# Patient Record
Sex: FEMALE | Race: WHITE | Employment: FULL TIME | URBAN - METROPOLITAN AREA
[De-identification: names, ages, dates, MRNs, and addresses within clinical notes are randomized per-mention and may not be internally consistent; named-entity substitution may affect disease eponyms.]

---

## 2021-04-20 RX ORDER — MIRTAZAPINE 15 MG/1
15 TABLET, FILM COATED ORAL NIGHTLY
Status: ON HOLD | COMMUNITY
End: 2022-10-11

## 2021-04-20 RX ORDER — LOSARTAN POTASSIUM 25 MG/1
25 TABLET ORAL DAILY
COMMUNITY

## 2021-04-20 RX ORDER — PRAVASTATIN SODIUM 40 MG
40 TABLET ORAL DAILY
COMMUNITY

## 2021-04-20 RX ORDER — THIAMINE MONONITRATE (VIT B1) 100 MG
100 TABLET ORAL DAILY
COMMUNITY

## 2021-04-20 RX ORDER — FUROSEMIDE 40 MG/1
40 TABLET ORAL DAILY
Status: ON HOLD | COMMUNITY
End: 2022-10-28 | Stop reason: HOSPADM

## 2021-04-20 RX ORDER — OMEPRAZOLE 20 MG/1
20 CAPSULE, DELAYED RELEASE ORAL NIGHTLY
COMMUNITY

## 2021-04-20 RX ORDER — GLIPIZIDE 5 MG/1
5 TABLET, FILM COATED, EXTENDED RELEASE ORAL DAILY
COMMUNITY

## 2021-04-20 RX ORDER — METOPROLOL SUCCINATE 50 MG/1
50 TABLET, EXTENDED RELEASE ORAL DAILY
Status: ON HOLD | COMMUNITY
End: 2022-10-11

## 2021-04-20 RX ORDER — ARIPIPRAZOLE 10 MG/1
10 TABLET ORAL DAILY
Status: ON HOLD | COMMUNITY
End: 2022-10-11

## 2021-04-22 ENCOUNTER — ANESTHESIA (OUTPATIENT)
Dept: OPERATING ROOM | Age: 66
End: 2021-04-22
Payer: MEDICAID

## 2021-04-22 ENCOUNTER — HOSPITAL ENCOUNTER (OUTPATIENT)
Age: 66
Setting detail: OUTPATIENT SURGERY
Discharge: HOME | End: 2021-04-22
Attending: OPHTHALMOLOGY | Admitting: OPHTHALMOLOGY
Payer: MEDICAID

## 2021-04-22 ENCOUNTER — ANESTHESIA EVENT (OUTPATIENT)
Dept: OPERATING ROOM | Age: 66
End: 2021-04-22
Payer: MEDICAID

## 2021-04-22 VITALS
TEMPERATURE: 98.6 F | OXYGEN SATURATION: 97 % | DIASTOLIC BLOOD PRESSURE: 85 MMHG | SYSTOLIC BLOOD PRESSURE: 165 MMHG | RESPIRATION RATE: 14 BRPM | BODY MASS INDEX: 28.93 KG/M2 | HEART RATE: 70 BPM | HEIGHT: 66 IN | WEIGHT: 180 LBS

## 2021-04-22 VITALS
DIASTOLIC BLOOD PRESSURE: 84 MMHG | OXYGEN SATURATION: 100 % | SYSTOLIC BLOOD PRESSURE: 163 MMHG | RESPIRATION RATE: 17 BRPM | TEMPERATURE: 95.6 F

## 2021-04-22 PROBLEM — H35.372 MACULAR PUCKER, LEFT EYE: Chronic | Status: ACTIVE | Noted: 2021-04-22

## 2021-04-22 LAB
GFR NON-AFRICAN AMERICAN: >60 ML/MIN
GFR SERPL CREATININE-BSD FRML MDRD: >60 ML/MIN
GFR SERPL CREATININE-BSD FRML MDRD: NORMAL ML/MIN/{1.73_M2}
GLUCOSE BLD-MCNC: 121 MG/DL (ref 74–100)
GLUCOSE BLD-MCNC: 87 MG/DL (ref 65–105)
POC CREATININE: 0.88 MG/DL (ref 0.51–1.19)
SARS-COV-2, RAPID: NOT DETECTED
SPECIMEN DESCRIPTION: NORMAL

## 2021-04-22 PROCEDURE — 2709999900 HC NON-CHARGEABLE SUPPLY: Performed by: OPHTHALMOLOGY

## 2021-04-22 PROCEDURE — 3700000000 HC ANESTHESIA ATTENDED CARE: Performed by: OPHTHALMOLOGY

## 2021-04-22 PROCEDURE — 2580000003 HC RX 258: Performed by: ANESTHESIOLOGY

## 2021-04-22 PROCEDURE — 6370000000 HC RX 637 (ALT 250 FOR IP): Performed by: OPHTHALMOLOGY

## 2021-04-22 PROCEDURE — 2500000003 HC RX 250 WO HCPCS: Performed by: NURSE ANESTHETIST, CERTIFIED REGISTERED

## 2021-04-22 PROCEDURE — 87635 SARS-COV-2 COVID-19 AMP PRB: CPT

## 2021-04-22 PROCEDURE — 6360000002 HC RX W HCPCS: Performed by: NURSE ANESTHETIST, CERTIFIED REGISTERED

## 2021-04-22 PROCEDURE — 6360000002 HC RX W HCPCS: Performed by: OPHTHALMOLOGY

## 2021-04-22 PROCEDURE — 3600000014 HC SURGERY LEVEL 4 ADDTL 15MIN: Performed by: OPHTHALMOLOGY

## 2021-04-22 PROCEDURE — 3600000004 HC SURGERY LEVEL 4 BASE: Performed by: OPHTHALMOLOGY

## 2021-04-22 PROCEDURE — 7100000010 HC PHASE II RECOVERY - FIRST 15 MIN: Performed by: OPHTHALMOLOGY

## 2021-04-22 PROCEDURE — 82565 ASSAY OF CREATININE: CPT

## 2021-04-22 PROCEDURE — 3700000001 HC ADD 15 MINUTES (ANESTHESIA): Performed by: OPHTHALMOLOGY

## 2021-04-22 PROCEDURE — 7100000011 HC PHASE II RECOVERY - ADDTL 15 MIN: Performed by: OPHTHALMOLOGY

## 2021-04-22 PROCEDURE — 93005 ELECTROCARDIOGRAM TRACING: CPT | Performed by: ANESTHESIOLOGY

## 2021-04-22 PROCEDURE — 82947 ASSAY GLUCOSE BLOOD QUANT: CPT

## 2021-04-22 PROCEDURE — 2580000003 HC RX 258: Performed by: OPHTHALMOLOGY

## 2021-04-22 PROCEDURE — 2500000003 HC RX 250 WO HCPCS: Performed by: OPHTHALMOLOGY

## 2021-04-22 RX ORDER — TROPICAMIDE 10 MG/ML
1 SOLUTION/ DROPS OPHTHALMIC
Status: COMPLETED | OUTPATIENT
Start: 2021-04-22 | End: 2021-04-22

## 2021-04-22 RX ORDER — TOBRAMYCIN AND DEXAMETHASONE 3; 1 MG/ML; MG/ML
1 SUSPENSION/ DROPS OPHTHALMIC ONCE
Status: COMPLETED | OUTPATIENT
Start: 2021-04-22 | End: 2021-04-22

## 2021-04-22 RX ORDER — PROMETHAZINE HYDROCHLORIDE 25 MG/ML
6.25 INJECTION, SOLUTION INTRAMUSCULAR; INTRAVENOUS
Status: DISCONTINUED | OUTPATIENT
Start: 2021-04-22 | End: 2021-04-22 | Stop reason: HOSPADM

## 2021-04-22 RX ORDER — METOCLOPRAMIDE HYDROCHLORIDE 5 MG/ML
10 INJECTION INTRAMUSCULAR; INTRAVENOUS
Status: DISCONTINUED | OUTPATIENT
Start: 2021-04-22 | End: 2021-04-22 | Stop reason: HOSPADM

## 2021-04-22 RX ORDER — TROPICAMIDE 10 MG/ML
SOLUTION/ DROPS OPHTHALMIC PRN
Status: DISCONTINUED | OUTPATIENT
Start: 2021-04-22 | End: 2021-04-22 | Stop reason: ALTCHOICE

## 2021-04-22 RX ORDER — DIPHENHYDRAMINE HYDROCHLORIDE 50 MG/ML
12.5 INJECTION INTRAMUSCULAR; INTRAVENOUS
Status: DISCONTINUED | OUTPATIENT
Start: 2021-04-22 | End: 2021-04-22 | Stop reason: HOSPADM

## 2021-04-22 RX ORDER — BALANCED SALT SOLUTION ENRICHED WITH BICARBONATE, DEXTROSE, AND GLUTATHIONE
KIT INTRAOCULAR PRN
Status: DISCONTINUED | OUTPATIENT
Start: 2021-04-22 | End: 2021-04-22 | Stop reason: ALTCHOICE

## 2021-04-22 RX ORDER — PHENYLEPHRINE HYDROCHLORIDE 100 MG/ML
1 SOLUTION/ DROPS OPHTHALMIC
Status: COMPLETED | OUTPATIENT
Start: 2021-04-22 | End: 2021-04-22

## 2021-04-22 RX ORDER — CEFTAZIDIME 1 G/1
INJECTION, POWDER, FOR SOLUTION INTRAMUSCULAR; INTRAVENOUS PRN
Status: DISCONTINUED | OUTPATIENT
Start: 2021-04-22 | End: 2021-04-22 | Stop reason: ALTCHOICE

## 2021-04-22 RX ORDER — SODIUM CHLORIDE, SODIUM LACTATE, POTASSIUM CHLORIDE, CALCIUM CHLORIDE 600; 310; 30; 20 MG/100ML; MG/100ML; MG/100ML; MG/100ML
INJECTION, SOLUTION INTRAVENOUS CONTINUOUS
Status: DISCONTINUED | OUTPATIENT
Start: 2021-04-22 | End: 2021-04-22 | Stop reason: HOSPADM

## 2021-04-22 RX ORDER — LIDOCAINE HYDROCHLORIDE 10 MG/ML
INJECTION, SOLUTION EPIDURAL; INFILTRATION; INTRACAUDAL; PERINEURAL PRN
Status: DISCONTINUED | OUTPATIENT
Start: 2021-04-22 | End: 2021-04-22 | Stop reason: SDUPTHER

## 2021-04-22 RX ORDER — INDOCYANINE GREEN AND WATER 25 MG
KIT INJECTION PRN
Status: DISCONTINUED | OUTPATIENT
Start: 2021-04-22 | End: 2021-04-22 | Stop reason: ALTCHOICE

## 2021-04-22 RX ORDER — PROPOFOL 10 MG/ML
INJECTION, EMULSION INTRAVENOUS PRN
Status: DISCONTINUED | OUTPATIENT
Start: 2021-04-22 | End: 2021-04-22 | Stop reason: SDUPTHER

## 2021-04-22 RX ORDER — ERYTHROMYCIN 5 MG/G
OINTMENT OPHTHALMIC PRN
Status: DISCONTINUED | OUTPATIENT
Start: 2021-04-22 | End: 2021-04-22 | Stop reason: ALTCHOICE

## 2021-04-22 RX ORDER — DEXTROSE MONOHYDRATE 25 G/50ML
INJECTION, SOLUTION INTRAVENOUS PRN
Status: DISCONTINUED | OUTPATIENT
Start: 2021-04-22 | End: 2021-04-22 | Stop reason: ALTCHOICE

## 2021-04-22 RX ORDER — HYDROCODONE BITARTRATE AND ACETAMINOPHEN 5; 325 MG/1; MG/1
1 TABLET ORAL
Status: DISCONTINUED | OUTPATIENT
Start: 2021-04-22 | End: 2021-04-22 | Stop reason: HOSPADM

## 2021-04-22 RX ORDER — HYDRALAZINE HYDROCHLORIDE 20 MG/ML
5 INJECTION INTRAMUSCULAR; INTRAVENOUS EVERY 10 MIN PRN
Status: DISCONTINUED | OUTPATIENT
Start: 2021-04-22 | End: 2021-04-22 | Stop reason: HOSPADM

## 2021-04-22 RX ORDER — FENTANYL CITRATE 50 UG/ML
INJECTION, SOLUTION INTRAMUSCULAR; INTRAVENOUS PRN
Status: DISCONTINUED | OUTPATIENT
Start: 2021-04-22 | End: 2021-04-22 | Stop reason: SDUPTHER

## 2021-04-22 RX ORDER — LIDOCAINE HYDROCHLORIDE 20 MG/ML
INJECTION, SOLUTION INFILTRATION; PERINEURAL PRN
Status: DISCONTINUED | OUTPATIENT
Start: 2021-04-22 | End: 2021-04-22 | Stop reason: ALTCHOICE

## 2021-04-22 RX ORDER — MEPERIDINE HYDROCHLORIDE 50 MG/ML
12.5 INJECTION INTRAMUSCULAR; INTRAVENOUS; SUBCUTANEOUS EVERY 5 MIN PRN
Status: DISCONTINUED | OUTPATIENT
Start: 2021-04-22 | End: 2021-04-22 | Stop reason: HOSPADM

## 2021-04-22 RX ADMIN — PHENYLEPHRINE HYDROCHLORIDE 1 DROP: 100 SOLUTION/ DROPS OPHTHALMIC at 12:15

## 2021-04-22 RX ADMIN — TOBRAMYCIN AND DEXAMETHASONE 1 DROP: 3; 1 SUSPENSION/ DROPS OPHTHALMIC at 12:20

## 2021-04-22 RX ADMIN — PHENYLEPHRINE HYDROCHLORIDE 1 DROP: 100 SOLUTION/ DROPS OPHTHALMIC at 12:09

## 2021-04-22 RX ADMIN — PHENYLEPHRINE HYDROCHLORIDE 1 DROP: 100 SOLUTION/ DROPS OPHTHALMIC at 12:20

## 2021-04-22 RX ADMIN — LIDOCAINE HYDROCHLORIDE 50 MG: 10 INJECTION, SOLUTION EPIDURAL; INFILTRATION; INTRACAUDAL; PERINEURAL at 13:14

## 2021-04-22 RX ADMIN — SODIUM CHLORIDE, POTASSIUM CHLORIDE, SODIUM LACTATE AND CALCIUM CHLORIDE: 600; 310; 30; 20 INJECTION, SOLUTION INTRAVENOUS at 13:10

## 2021-04-22 RX ADMIN — TROPICAMIDE 1 DROP: 10 SOLUTION/ DROPS OPHTHALMIC at 12:09

## 2021-04-22 RX ADMIN — PROPOFOL 40 MG: 10 INJECTION, EMULSION INTRAVENOUS at 13:15

## 2021-04-22 RX ADMIN — FENTANYL CITRATE 50 MCG: 50 INJECTION, SOLUTION INTRAMUSCULAR; INTRAVENOUS at 13:20

## 2021-04-22 RX ADMIN — FENTANYL CITRATE 50 MCG: 50 INJECTION, SOLUTION INTRAMUSCULAR; INTRAVENOUS at 13:36

## 2021-04-22 RX ADMIN — SODIUM CHLORIDE, POTASSIUM CHLORIDE, SODIUM LACTATE AND CALCIUM CHLORIDE: 600; 310; 30; 20 INJECTION, SOLUTION INTRAVENOUS at 12:21

## 2021-04-22 RX ADMIN — TROPICAMIDE 1 DROP: 10 SOLUTION/ DROPS OPHTHALMIC at 12:15

## 2021-04-22 RX ADMIN — TROPICAMIDE 1 DROP: 10 SOLUTION/ DROPS OPHTHALMIC at 12:20

## 2021-04-22 ASSESSMENT — PULMONARY FUNCTION TESTS
PIF_VALUE: 0
PIF_VALUE: 1
PIF_VALUE: 0
PIF_VALUE: 1
PIF_VALUE: 0

## 2021-04-22 ASSESSMENT — PAIN SCALES - GENERAL: PAINLEVEL_OUTOF10: 0

## 2021-04-22 NOTE — FLOWSHEET NOTE
Attempted to call sister, Sb Patel, with no answer. Pt has medical ride how and will be staying with sister overnight.

## 2021-04-22 NOTE — OP NOTE
Operative Note      Patient: Akash Mendoza  YOB: 1955  MRN: [id number]    Date of Procedure: 4/22/2021    Akash Mendoza   4/22/2021      2:07 PM  1955  [id number]    BRIEF OPERATIVE NOTE    PREOP Diagnosis: Macular Pucker, Left Eye    POSTOP Diagnosis:  Same    Procedure:  Vitrectomy,  membrane stripping, removal of internal limiting membrane,  ICG, Left eye    Anesthesia:  MAC    Surgeon:  Betty Cedeño MD    EBL:  Minimal    Fluids:  See anesthesia record    Drains:  None    Specimen:  None    Complications:  None    Reason for operation:   The patient has significant vision loss that is interfering with daily activities. Patient wishes to have surgery to remove macular pucker in anticipation of better vision. Patient understands vision is not likely to return fully to normal and it will take 3-6 months to obtain best vision. Patient understands risks of surgery include, but are not limited to, bleeding, infection and retinal detachment. Cataract development is accelerated, usually resulting in cataract surgery in 6-24 months. Procedure: The patient was brought to the operating room in good condition. Transient Propofol was given. Retrobulbar and topical anaesthesia were administered. The patient was prepped and draped in the usual manner. 25 gauge vitrectomy trocars were inserted in the usual quadrants. Infusion was inserted in the inferior temporal quadrant. Light pipe and ocutome placed through the superior trocars. Vitreous was removed from anterior to posterior and trimmed out past the equator in all quadrants. ICG was placed over the posterior pole and allowed to remain in place for 30 seconds. It was removed with silicone tipped extrusion. Intraocular forceps were used to peel ILM and ERM from the central 2/3 of the posterior pole. No significant bleeding occurred at any time. No tears or detachment were found with scleral depression.  The trocars were removed and sutured if there any leakage. Subtenon's antibiotic was injected in the inferior nasal quadrant. The eye was patched in the usual fashion and the patient taken to the recovery room in good condition.         Electronically signed by Nimisha Alvarado MD on 4/22/2021 at 2:07 PMMa

## 2021-04-22 NOTE — H&P
History and Physical    Pt Name: Michael Lou  MRN: [id number]  YOB: 1955  Date of evaluation: 4/22/2021    SUBJECTIVE:   History of Chief Complaint:    Patient presents preprocedure for left vitrectomy. She relates blurred vision to the left eye. She has not had any eye procedures in the past.  She has been diagnosed with macular pucker, scheduled for vitrectomy today. Past Medical History    has a past medical history of Depression, Diabetes mellitus ([address] GERD (gastroesophageal reflux disease), Hepatitis C, Hyperlipidemia, Hypertension, Irregular heartbeat, Murmur, cardiac, Urinary bladder incontinence, Wears eyeglasses, Well adult health check, Well adult health check, and Well adult health check. Past Surgical History   has a past surgical history that includes hip surgery (Right, 1964); Tubal ligation (1975); Breast surgery (Right, 1975); Tonsillectomy; and Colonoscopy. Medications  Prior to Admission medications    Medication Sig Start Date End Date Taking?  Authorizing Provider   furosemide (LASIX) 40 MG tablet Take 40 mg by mouth daily   Yes Historical Provider, MD   vitamin B-1 (THIAMINE) 100 MG tablet Take 100 mg by mouth daily   Yes Historical Provider, MD   pravastatin (PRAVACHOL) 40 MG tablet Take 40 mg by mouth daily   Yes Historical Provider, MD   losartan (COZAAR) 25 MG tablet Take 25 mg by mouth daily   Yes Historical Provider, MD   metoprolol succinate (TOPROL XL) 50 MG extended release tablet Take 50 mg by mouth daily   Yes Historical Provider, MD   glipiZIDE (GLUCOTROL XL) 5 MG extended release tablet Take 5 mg by mouth 2 times daily   Yes Historical Provider, MD   VORTIoxetine HBr (TRINTELLIX) 20 MG TABS tablet Take 10 mg by mouth nightly   Yes Historical Provider, MD   ARIPiprazole (ABILIFY) 10 MG tablet Take 10 mg by mouth daily   Yes Historical Provider, MD   omeprazole (PRILOSEC) 20 MG delayed release capsule Take 20 mg by mouth daily   Yes Historical Provider, MD mirtazapine (REMERON) 15 MG tablet Take 15 mg by mouth nightly   Yes Historical Provider, MD     Allergies  is allergic to lisinopril. Family History  family history is not on file. Social History   reports that she quit smoking about 25 years ago. She has never used smokeless tobacco.   has no history on file for alcohol.   has no history on file for drug. Marital Status single    OBJECTIVE:   VITALS:  height is 5' 6\" (1.676 m) and weight is 180 lb (81.6 kg). Her temporal temperature is 97.2 °F (36.2 °C). Her blood pressure is 183/97 (abnormal) and her pulse is 67. Her respiration is 18. CONSTITUTIONAL:alert & oriented x 3, no acute distress. Pleasant. SKIN:  Warm and dry, no rashes on exposed areas of skin. HEAD:  Normocephalic, atraumatic. EYES: EOMs intact. EARS:  Hearing grossly WNL. NOSE:  Nares patent. No rhinorrhea. MOUTH/THROAT:  benign  NECK:supple, no lymphadenopathy  LUNGS: Clear to auscultation bilaterally, no wheezes. CARDIOVASCULAR: RRR. Very faint systolic murmur noted. ABDOMEN: soft, non tender, non distended. Rotund. EXTREMITIES: no edema bilateral lower extremities. IMPRESSIONS:   1. Left macular pucker  2.  has a past medical history of Depression, Diabetes mellitus ([address] GERD (gastroesophageal reflux disease), Hepatitis C, Hyperlipidemia, Hypertension, Irregular heartbeat, Murmur, cardiac, Urinary bladder incontinence, Wears eyeglasses, Well adult health check, Well adult health check, and Well adult health check. PLANS:   1.  Left vitrectomy    NAHED Pierre PA-C  Electronically signed 4/22/2021 at 12:16 PM

## 2021-04-22 NOTE — ANESTHESIA POSTPROCEDURE EVALUATION
Department of Anesthesiology  Postprocedure Note    Patient: Juancarlos Zelaya  MRN: [id number]  [place]: 1955  Date of evaluation: 4/22/2021  Time:  4:54 PM     Procedure Summary     Date: 04/22/21 Room / Location: [address]    Anesthesia Start: 0946 Anesthesia Stop: 5513    Procedure: VITRECTOMY 25 GAUGE, MEMBRANE PEEL, ICG (Left ) Diagnosis: (MACULAR PUCKER)    Surgeons: Chemo Tucker MD Responsible Provider: Guadlupe Kayser, MD    Anesthesia Type: MAC ASA Status: 3          Anesthesia Type: MAC    Ander Phase I:      Ander Phase II: Ander Score: 10    Last vitals: Reviewed and per EMR flowsheets.        Anesthesia Post Evaluation    Patient location during evaluation: PACU  Patient participation: complete - patient participated  Level of consciousness: awake and alert  Pain score: 0  Airway patency: patent  Nausea & Vomiting: no nausea and no vomiting  Complications: no  Cardiovascular status: hemodynamically stable  Respiratory status: acceptable  Hydration status: euvolemic

## 2021-04-22 NOTE — ANESTHESIA PRE PROCEDURE
Department of Anesthesiology  Preprocedure Note       Name:  Keely Campbell   Age:  61 y.o.  :  1955                                          MRN:  [id number]         Date:  2021      Surgeon: Olamide Lopez):  Chandrakant Michaels MD    Procedure: Procedure(s):  VITRECTOMY 25 GAUGE, MEMBRANE PEEL, ICG    Medications prior to admission:   Prior to Admission medications    Medication Sig Start Date End Date Taking?  Authorizing Provider   furosemide (LASIX) 40 MG tablet Take 40 mg by mouth daily   Yes Historical Provider, MD   vitamin B-1 (THIAMINE) 100 MG tablet Take 100 mg by mouth daily   Yes Historical Provider, MD   pravastatin (PRAVACHOL) 40 MG tablet Take 40 mg by mouth daily   Yes Historical Provider, MD   losartan (COZAAR) 25 MG tablet Take 25 mg by mouth daily   Yes Historical Provider, MD   metoprolol succinate (TOPROL XL) 50 MG extended release tablet Take 50 mg by mouth daily   Yes Historical Provider, MD   glipiZIDE (GLUCOTROL XL) 5 MG extended release tablet Take 5 mg by mouth 2 times daily   Yes Historical Provider, MD   VORTIoxetine HBr (TRINTELLIX) 20 MG TABS tablet Take 10 mg by mouth nightly   Yes Historical Provider, MD   ARIPiprazole (ABILIFY) 10 MG tablet Take 10 mg by mouth daily   Yes Historical Provider, MD   omeprazole (PRILOSEC) 20 MG delayed release capsule Take 20 mg by mouth daily   Yes Historical Provider, MD   mirtazapine (REMERON) 15 MG tablet Take 15 mg by mouth nightly   Yes Historical Provider, MD       Current medications:    Current Facility-Administered Medications   Medication Dose Route Frequency Provider Last Rate Last Admin    tobramycin-dexamethasone (TOBRADEX) ophthalmic suspension 1 drop  1 drop Left Eye Once Chandrakant Michaels MD        tropicamide (MYDRIACYL) 1 % ophthalmic solution 1 drop  1 drop Left Eye Q5 Min Chandrakant Michaels MD        phenylephrine (VERONICA-SYNEPHRINE) 10 % ophthalmic solution 1 drop  1 drop Left Eye Q5 John Rubio MD           Allergies: Allergies   Allergen Reactions    Lisinopril Other (See Comments)     COUGH       Problem List:  There is no problem list on file for this patient. Past Medical History:        Diagnosis Date    Depression     Diabetes mellitus (HCC)     GERD (gastroesophageal reflux disease)     Hepatitis C     WITH FULL RECOVERY - INFECTIOUS DISEASE DR. Ina Calderon - LAST VISIT 2020    Hyperlipidemia     Hypertension     Irregular heartbeat     Murmur, cardiac     noted by PCP, no echo per patient    Urinary bladder incontinence     Wears eyeglasses     Well adult health check     PCP - DR. Eric Doherty - 3/2021    [facility] adult health check     INFECTIOUS DISEASE - DR. Maria Isabel Nolan    [facility] adult health check     GI - DR. Rosie Cook       Past Surgical History:        Procedure Laterality Date    BREAST SURGERY Right     REMOVAL BENIGN TUMOR    COLONOSCOPY      HIP SURGERY Right     RECONSTRUCTION    TONSILLECTOMY      AS A CHILD    TUBAL LIGATION         Social History:    Social History     Tobacco Use    Smoking status: Former Smoker     Quit date:      Years since quittin.3    Smokeless tobacco: Never Used   Substance Use Topics    Alcohol use: Not on file                                Counseling given: Not Answered      Vital Signs (Current):   Vitals:    21 0946   Weight: 180 lb (81.6 kg)   Height: 5' 6\" (1.676 m)                                              BP Readings from Last 3 Encounters:   No data found for BP       NPO Status:                                                                                 BMI:   Wt Readings from Last 3 Encounters:   21 180 lb (81.6 kg)     Body mass index is 29.05 kg/m².     CBC: No results found for: WBC, RBC, HGB, HCT, MCV, RDW, PLT    CMP: No results found for: NA, K, CL, CO2, BUN, CREATININE, GFRAA, AGRATIO, LABGLOM, GLUCOSE, PROT, CALCIUM, BILITOT, ALKPHOS, AST, ALT    POC Tests: No

## 2021-04-23 LAB
EKG ATRIAL RATE: 66 BPM
EKG P AXIS: -6 DEGREES
EKG P-R INTERVAL: 180 MS
EKG Q-T INTERVAL: 414 MS
EKG QRS DURATION: 78 MS
EKG QTC CALCULATION (BAZETT): 434 MS
EKG R AXIS: 3 DEGREES
EKG T AXIS: 58 DEGREES
EKG VENTRICULAR RATE: 66 BPM

## 2022-10-10 ENCOUNTER — HOSPITAL ENCOUNTER (OUTPATIENT)
Age: 67
Setting detail: SPECIMEN
Discharge: HOME | End: 2022-10-10

## 2022-10-10 LAB
CHOLESTEROL/HDL RATIO: 4.2
CHOLESTEROL: 138 MG/DL
HDLC SERPL-MCNC: 33 MG/DL
LDL CHOLESTEROL: 51 MG/DL (ref 0–130)
TRIGL SERPL-MCNC: 269 MG/DL

## 2022-10-10 PROCEDURE — P9603 ONE-WAY ALLOW PRORATED MILES: HCPCS

## 2022-10-10 PROCEDURE — 80061 LIPID PANEL: CPT

## 2022-10-10 PROCEDURE — 36415 COLL VENOUS BLD VENIPUNCTURE: CPT

## 2022-10-11 ENCOUNTER — HOSPITAL ENCOUNTER (INPATIENT)
Age: 67
LOS: 17 days | Discharge: NURSING, REHAB OR LONG-TERM CARE | DRG: 872 | End: 2022-10-28
Attending: EMERGENCY MEDICINE | Admitting: FAMILY MEDICINE
Payer: MEDICARE

## 2022-10-11 ENCOUNTER — APPOINTMENT (OUTPATIENT)
Dept: CT IMAGING | Age: 67
DRG: 872 | End: 2022-10-11
Payer: MEDICARE

## 2022-10-11 ENCOUNTER — HOSPITAL ENCOUNTER (OUTPATIENT)
Age: 67
Setting detail: SPECIMEN
Discharge: HOME | End: 2022-10-11

## 2022-10-11 ENCOUNTER — APPOINTMENT (OUTPATIENT)
Dept: GENERAL RADIOLOGY | Age: 67
DRG: 872 | End: 2022-10-11
Payer: MEDICARE

## 2022-10-11 DIAGNOSIS — E86.0 DEHYDRATION: ICD-10-CM

## 2022-10-11 DIAGNOSIS — F32.3 MAJOR DEPRESSIVE DISORDER WITH PSYCHOTIC FEATURES (HCC): ICD-10-CM

## 2022-10-11 DIAGNOSIS — A41.01 MSSA (METHICILLIN SUSCEPTIBLE STAPHYLOCOCCUS AUREUS) SEPTICEMIA (HCC): ICD-10-CM

## 2022-10-11 DIAGNOSIS — E87.0 HYPERNATREMIA: Primary | ICD-10-CM

## 2022-10-11 DIAGNOSIS — R41.82 ALTERED MENTAL STATUS, UNSPECIFIED ALTERED MENTAL STATUS TYPE: ICD-10-CM

## 2022-10-11 PROBLEM — I25.10 ASHD (ARTERIOSCLEROTIC HEART DISEASE): Status: ACTIVE | Noted: 2022-10-11

## 2022-10-11 PROBLEM — I10 ESSENTIAL HYPERTENSION: Status: ACTIVE | Noted: 2022-10-11

## 2022-10-11 PROBLEM — E11.9 TYPE 2 DIABETES MELLITUS WITHOUT COMPLICATION (HCC): Status: ACTIVE | Noted: 2022-10-11

## 2022-10-11 PROBLEM — K21.9 GASTROESOPHAGEAL REFLUX DISEASE WITHOUT ESOPHAGITIS: Status: ACTIVE | Noted: 2022-10-11

## 2022-10-11 LAB
ABSOLUTE EOS #: 0 K/UL (ref 0–0.44)
ABSOLUTE IMMATURE GRANULOCYTE: 0 K/UL (ref 0–0.3)
ABSOLUTE LYMPH #: 1.58 K/UL (ref 1.1–3.7)
ABSOLUTE MONO #: 1.58 K/UL (ref 0.1–1.2)
ALBUMIN SERPL-MCNC: 4.3 G/DL (ref 3.5–5.2)
ALBUMIN SERPL-MCNC: 4.5 G/DL (ref 3.5–5.2)
ALBUMIN/GLOBULIN RATIO: 1.2 (ref 1–2.5)
ALBUMIN/GLOBULIN RATIO: 1.2 (ref 1–2.5)
ALP BLD-CCNC: 60 U/L (ref 35–104)
ALP BLD-CCNC: 62 U/L (ref 35–104)
ALT SERPL-CCNC: 53 U/L (ref 5–33)
ALT SERPL-CCNC: 56 U/L (ref 5–33)
AMMONIA: 18 UMOL/L (ref 11–41)
AMPHETAMINE SCREEN URINE: NEGATIVE
ANION GAP SERPL CALCULATED.3IONS-SCNC: 13 MMOL/L (ref 9–17)
ANION GAP SERPL CALCULATED.3IONS-SCNC: 14 MMOL/L (ref 9–17)
ANION GAP SERPL CALCULATED.3IONS-SCNC: 18 MMOL/L (ref 9–17)
ANION GAP SERPL CALCULATED.3IONS-SCNC: 18 MMOL/L (ref 9–17)
AST SERPL-CCNC: 59 U/L
AST SERPL-CCNC: 68 U/L
BACTERIA: ABNORMAL
BACTERIA: ABNORMAL
BARBITURATE SCREEN URINE: NEGATIVE
BASOPHILS # BLD: 0 % (ref 0–2)
BASOPHILS ABSOLUTE: 0 K/UL (ref 0–0.2)
BENZODIAZEPINE SCREEN, URINE: POSITIVE
BILIRUB SERPL-MCNC: 1.6 MG/DL (ref 0.3–1.2)
BILIRUB SERPL-MCNC: 1.7 MG/DL (ref 0.3–1.2)
BILIRUBIN URINE: NEGATIVE
BILIRUBIN URINE: NEGATIVE
BUN BLDV-MCNC: 64 MG/DL (ref 8–23)
BUN BLDV-MCNC: 67 MG/DL (ref 8–23)
BUN BLDV-MCNC: 68 MG/DL (ref 8–23)
BUN BLDV-MCNC: 72 MG/DL (ref 8–23)
BUN/CREAT BLD: 26 (ref 9–20)
BUN/CREAT BLD: 29 (ref 9–20)
BUN/CREAT BLD: 31 (ref 9–20)
BUN/CREAT BLD: 32 (ref 9–20)
BUPRENORPHINE URINE: NEGATIVE
CALCIUM IONIZED: 1.25 MMOL/L (ref 1.13–1.33)
CALCIUM SERPL-MCNC: 10 MG/DL (ref 8.6–10.4)
CALCIUM SERPL-MCNC: 9.5 MG/DL (ref 8.6–10.4)
CALCIUM SERPL-MCNC: 9.6 MG/DL (ref 8.6–10.4)
CALCIUM SERPL-MCNC: 9.9 MG/DL (ref 8.6–10.4)
CANNABINOID SCREEN URINE: NEGATIVE
CHLORIDE BLD-SCNC: 114 MMOL/L (ref 98–107)
CHLORIDE BLD-SCNC: 115 MMOL/L (ref 98–107)
CHLORIDE BLD-SCNC: 118 MMOL/L (ref 98–107)
CHLORIDE BLD-SCNC: 121 MMOL/L (ref 98–107)
CHLORIDE, UR: <20 MMOL/L
CO2: 25 MMOL/L (ref 20–31)
CO2: 26 MMOL/L (ref 20–31)
CO2: 26 MMOL/L (ref 20–31)
CO2: 28 MMOL/L (ref 20–31)
COCAINE METABOLITE, URINE: NEGATIVE
COLOR: YELLOW
COLOR: YELLOW
CREAT SERPL-MCNC: 1.97 MG/DL (ref 0.5–0.9)
CREAT SERPL-MCNC: 2.17 MG/DL (ref 0.5–0.9)
CREAT SERPL-MCNC: 2.5 MG/DL (ref 0.5–0.9)
CREAT SERPL-MCNC: 2.62 MG/DL (ref 0.5–0.9)
CREATININE URINE: 126.2 MG/DL (ref 28–217)
CREATININE URINE: 127.5 MG/DL (ref 28–217)
EKG ATRIAL RATE: 90 BPM
EKG P-R INTERVAL: 152 MS
EKG Q-T INTERVAL: 400 MS
EKG QRS DURATION: 76 MS
EKG QTC CALCULATION (BAZETT): 489 MS
EKG R AXIS: -154 DEGREES
EKG T AXIS: 164 DEGREES
EKG VENTRICULAR RATE: 90 BPM
EOSINOPHILS RELATIVE PERCENT: 0 % (ref 1–4)
EPITHELIAL CELLS UA: ABNORMAL /HPF (ref 0–25)
EPITHELIAL CELLS UA: ABNORMAL /HPF (ref 0–25)
GFR SERPL CREATININE-BSD FRML MDRD: 20 ML/MIN/1.73M2
GFR SERPL CREATININE-BSD FRML MDRD: 21 ML/MIN/1.73M2
GFR SERPL CREATININE-BSD FRML MDRD: 25 ML/MIN/1.73M2
GFR SERPL CREATININE-BSD FRML MDRD: 28 ML/MIN/1.73M2
GLUCOSE BLD-MCNC: 164 MG/DL (ref 74–100)
GLUCOSE BLD-MCNC: 200 MG/DL (ref 70–99)
GLUCOSE BLD-MCNC: 262 MG/DL (ref 70–99)
GLUCOSE BLD-MCNC: 348 MG/DL (ref 70–99)
GLUCOSE BLD-MCNC: 397 MG/DL (ref 70–99)
GLUCOSE URINE: ABNORMAL
GLUCOSE URINE: ABNORMAL
HCO3 VENOUS: 24.3 MMOL/L (ref 24–30)
HCT VFR BLD CALC: 50.7 % (ref 36.3–47.1)
HCT VFR BLD CALC: 53.1 % (ref 36.3–47.1)
HEMOGLOBIN: 15.9 G/DL (ref 11.9–15.1)
HEMOGLOBIN: 16.9 G/DL (ref 11.9–15.1)
IMMATURE GRANULOCYTES: 0 %
INR BLD: 1.4
KETONES, URINE: NEGATIVE
KETONES, URINE: NEGATIVE
LACTIC ACID, SEPSIS: 1.9 MMOL/L (ref 0.5–1.9)
LACTIC ACID, SEPSIS: 2.5 MMOL/L (ref 0.5–1.9)
LEUKOCYTE ESTERASE, URINE: NEGATIVE
LEUKOCYTE ESTERASE, URINE: NEGATIVE
LYMPHOCYTES # BLD: 12 % (ref 24–43)
MAGNESIUM: 3.1 MG/DL (ref 1.6–2.6)
MCH RBC QN AUTO: 29.3 PG (ref 25.2–33.5)
MCH RBC QN AUTO: 30 PG (ref 25.2–33.5)
MCHC RBC AUTO-ENTMCNC: 31.4 G/DL (ref 28.4–34.8)
MCHC RBC AUTO-ENTMCNC: 31.8 G/DL (ref 28.4–34.8)
MCV RBC AUTO: 93.5 FL (ref 82.6–102.9)
MCV RBC AUTO: 94.3 FL (ref 82.6–102.9)
METHADONE SCREEN, URINE: NEGATIVE
METHAMPHETAMINE, URINE: NEGATIVE
MONOCYTES # BLD: 12 % (ref 3–12)
MORPHOLOGY: NORMAL
MUCUS: ABNORMAL
NEGATIVE BASE EXCESS, VEN: 0.6 MMOL/L (ref 0–2)
NITRITE, URINE: NEGATIVE
NITRITE, URINE: NEGATIVE
NRBC AUTOMATED: 0 PER 100 WBC
NRBC AUTOMATED: 0 PER 100 WBC
O2 SAT, VEN: 77.8 % (ref 60–85)
OPIATES, URINE: NEGATIVE
OXYCODONE SCREEN URINE: NEGATIVE
PATIENT TEMP: 37
PCO2, VEN: 41.2 MM HG (ref 39–55)
PDW BLD-RTO: 13.4 % (ref 11.8–14.4)
PDW BLD-RTO: 13.6 % (ref 11.8–14.4)
PH UA: 5.5 (ref 5–9)
PH UA: 6 (ref 5–9)
PH VENOUS: 7.39 (ref 7.32–7.42)
PHENCYCLIDINE, URINE: NEGATIVE
PHOSPHORUS: 5.8 MG/DL (ref 2.6–4.5)
PLATELET # BLD: 112 K/UL (ref 138–453)
PLATELET # BLD: 125 K/UL (ref 138–453)
PMV BLD AUTO: 10.3 FL (ref 8.1–13.5)
PMV BLD AUTO: 10.3 FL (ref 8.1–13.5)
PO2, VEN: 42.5 MM HG (ref 30–50)
POTASSIUM SERPL-SCNC: 3.4 MMOL/L (ref 3.7–5.3)
POTASSIUM SERPL-SCNC: 3.5 MMOL/L (ref 3.7–5.3)
POTASSIUM SERPL-SCNC: 3.7 MMOL/L (ref 3.7–5.3)
POTASSIUM SERPL-SCNC: 3.7 MMOL/L (ref 3.7–5.3)
POTASSIUM, UR: 34.3 MMOL/L
PROPOXYPHENE, URINE: NEGATIVE
PROTEIN UA: NEGATIVE
PROTEIN UA: NEGATIVE
PROTHROMBIN TIME: 17.3 SEC (ref 11.5–14.2)
RBC # BLD: 5.42 M/UL (ref 3.95–5.11)
RBC # BLD: 5.63 M/UL (ref 3.95–5.11)
RBC UA: ABNORMAL /HPF (ref 0–2)
RBC UA: ABNORMAL /HPF (ref 0–2)
SARS-COV-2, RAPID: NOT DETECTED
SEG NEUTROPHILS: 76 % (ref 36–65)
SEGMENTED NEUTROPHILS ABSOLUTE COUNT: 10.04 K/UL (ref 1.5–8.1)
SODIUM BLD-SCNC: 157 MMOL/L (ref 135–144)
SODIUM BLD-SCNC: 158 MMOL/L (ref 135–144)
SODIUM BLD-SCNC: 160 MMOL/L (ref 135–144)
SODIUM BLD-SCNC: 161 MMOL/L (ref 135–144)
SODIUM,UR: 21 MMOL/L
SPECIFIC GRAVITY UA: 1.02 (ref 1.01–1.02)
SPECIFIC GRAVITY UA: 1.02 (ref 1.01–1.02)
SPECIMEN DESCRIPTION: NORMAL
TOTAL PROTEIN, URINE: 18 MG/DL
TOTAL PROTEIN: 7.8 G/DL (ref 6.4–8.3)
TOTAL PROTEIN: 8.2 G/DL (ref 6.4–8.3)
TRICYCLIC ANTIDEPRESSANTS, UR: NEGATIVE
TURBIDITY: ABNORMAL
TURBIDITY: CLEAR
URINE HGB: ABNORMAL
URINE HGB: NEGATIVE
URINE TOTAL PROTEIN CREATININE RATIO: 0.14 (ref 0–0.2)
UROBILINOGEN, URINE: ABNORMAL
UROBILINOGEN, URINE: NORMAL
WBC # BLD: 12.9 K/UL (ref 3.5–11.3)
WBC # BLD: 13.2 K/UL (ref 3.5–11.3)
WBC UA: ABNORMAL /HPF (ref 0–5)
WBC UA: ABNORMAL /HPF (ref 0–5)

## 2022-10-11 PROCEDURE — 82947 ASSAY GLUCOSE BLOOD QUANT: CPT

## 2022-10-11 PROCEDURE — 80048 BASIC METABOLIC PNL TOTAL CA: CPT

## 2022-10-11 PROCEDURE — 82140 ASSAY OF AMMONIA: CPT

## 2022-10-11 PROCEDURE — 86403 PARTICLE AGGLUT ANTBDY SCRN: CPT

## 2022-10-11 PROCEDURE — 80306 DRUG TEST PRSMV INSTRMNT: CPT

## 2022-10-11 PROCEDURE — 82570 ASSAY OF URINE CREATININE: CPT

## 2022-10-11 PROCEDURE — 82805 BLOOD GASES W/O2 SATURATION: CPT

## 2022-10-11 PROCEDURE — 82330 ASSAY OF CALCIUM: CPT

## 2022-10-11 PROCEDURE — 36415 COLL VENOUS BLD VENIPUNCTURE: CPT

## 2022-10-11 PROCEDURE — 83735 ASSAY OF MAGNESIUM: CPT

## 2022-10-11 PROCEDURE — 1200000000 HC SEMI PRIVATE

## 2022-10-11 PROCEDURE — 84540 ASSAY OF URINE/UREA-N: CPT

## 2022-10-11 PROCEDURE — 87040 BLOOD CULTURE FOR BACTERIA: CPT

## 2022-10-11 PROCEDURE — 93010 ELECTROCARDIOGRAM REPORT: CPT | Performed by: FAMILY MEDICINE

## 2022-10-11 PROCEDURE — 70450 CT HEAD/BRAIN W/O DYE: CPT

## 2022-10-11 PROCEDURE — 84300 ASSAY OF URINE SODIUM: CPT

## 2022-10-11 PROCEDURE — 80053 COMPREHEN METABOLIC PANEL: CPT

## 2022-10-11 PROCEDURE — 94761 N-INVAS EAR/PLS OXIMETRY MLT: CPT

## 2022-10-11 PROCEDURE — 81001 URINALYSIS AUTO W/SCOPE: CPT

## 2022-10-11 PROCEDURE — 87635 SARS-COV-2 COVID-19 AMP PRB: CPT

## 2022-10-11 PROCEDURE — C9803 HOPD COVID-19 SPEC COLLECT: HCPCS

## 2022-10-11 PROCEDURE — 83935 ASSAY OF URINE OSMOLALITY: CPT

## 2022-10-11 PROCEDURE — 71045 X-RAY EXAM CHEST 1 VIEW: CPT

## 2022-10-11 PROCEDURE — 85027 COMPLETE CBC AUTOMATED: CPT

## 2022-10-11 PROCEDURE — 87186 SC STD MICRODIL/AGAR DIL: CPT

## 2022-10-11 PROCEDURE — 84156 ASSAY OF PROTEIN URINE: CPT

## 2022-10-11 PROCEDURE — 84100 ASSAY OF PHOSPHORUS: CPT

## 2022-10-11 PROCEDURE — 2580000003 HC RX 258: Performed by: EMERGENCY MEDICINE

## 2022-10-11 PROCEDURE — 51702 INSERT TEMP BLADDER CATH: CPT

## 2022-10-11 PROCEDURE — 85025 COMPLETE CBC W/AUTO DIFF WBC: CPT

## 2022-10-11 PROCEDURE — 83036 HEMOGLOBIN GLYCOSYLATED A1C: CPT

## 2022-10-11 PROCEDURE — 82436 ASSAY OF URINE CHLORIDE: CPT

## 2022-10-11 PROCEDURE — 85610 PROTHROMBIN TIME: CPT

## 2022-10-11 PROCEDURE — 6360000002 HC RX W HCPCS: Performed by: FAMILY MEDICINE

## 2022-10-11 PROCEDURE — 87205 SMEAR GRAM STAIN: CPT

## 2022-10-11 PROCEDURE — 99285 EMERGENCY DEPT VISIT HI MDM: CPT

## 2022-10-11 PROCEDURE — 87154 CUL TYP ID BLD PTHGN 6+ TRGT: CPT

## 2022-10-11 PROCEDURE — 83605 ASSAY OF LACTIC ACID: CPT

## 2022-10-11 PROCEDURE — 6370000000 HC RX 637 (ALT 250 FOR IP): Performed by: FAMILY MEDICINE

## 2022-10-11 PROCEDURE — 84133 ASSAY OF URINE POTASSIUM: CPT

## 2022-10-11 PROCEDURE — 93005 ELECTROCARDIOGRAM TRACING: CPT | Performed by: EMERGENCY MEDICINE

## 2022-10-11 RX ORDER — GAUZE BANDAGE 2" X 2"
100 BANDAGE TOPICAL DAILY
Status: DISCONTINUED | OUTPATIENT
Start: 2022-10-11 | End: 2022-10-28 | Stop reason: HOSPADM

## 2022-10-11 RX ORDER — FOLIC ACID 1 MG/1
1 TABLET ORAL DAILY
COMMUNITY

## 2022-10-11 RX ORDER — DIVALPROEX SODIUM 125 MG/1
250 CAPSULE, COATED PELLETS ORAL 3 TIMES DAILY
Status: ON HOLD | COMMUNITY
End: 2022-10-28 | Stop reason: SDUPTHER

## 2022-10-11 RX ORDER — DEXTROSE MONOHYDRATE 100 MG/ML
INJECTION, SOLUTION INTRAVENOUS CONTINUOUS PRN
Status: DISCONTINUED | OUTPATIENT
Start: 2022-10-11 | End: 2022-10-28 | Stop reason: HOSPADM

## 2022-10-11 RX ORDER — HALOPERIDOL 5 MG/1
5 TABLET ORAL 4 TIMES DAILY PRN
Status: ON HOLD | COMMUNITY
End: 2022-10-11

## 2022-10-11 RX ORDER — BENZTROPINE MESYLATE 1 MG/1
1 TABLET ORAL 2 TIMES DAILY
Status: ON HOLD | COMMUNITY
End: 2022-10-11

## 2022-10-11 RX ORDER — DIVALPROEX SODIUM 125 MG/1
125 CAPSULE, COATED PELLETS ORAL 3 TIMES DAILY
Status: DISCONTINUED | OUTPATIENT
Start: 2022-10-11 | End: 2022-10-28

## 2022-10-11 RX ORDER — POTASSIUM CHLORIDE 750 MG/1
10 TABLET, EXTENDED RELEASE ORAL DAILY
COMMUNITY

## 2022-10-11 RX ORDER — RISPERIDONE 1 MG/1
2 TABLET ORAL 2 TIMES DAILY
Status: DISCONTINUED | OUTPATIENT
Start: 2022-10-11 | End: 2022-10-19

## 2022-10-11 RX ORDER — SODIUM CHLORIDE 0.9 % (FLUSH) 0.9 %
5-40 SYRINGE (ML) INJECTION PRN
Status: DISCONTINUED | OUTPATIENT
Start: 2022-10-11 | End: 2022-10-28 | Stop reason: HOSPADM

## 2022-10-11 RX ORDER — BENZTROPINE MESYLATE 1 MG/1
1 TABLET ORAL 2 TIMES DAILY
COMMUNITY

## 2022-10-11 RX ORDER — POTASSIUM CHLORIDE 750 MG/1
10 TABLET, EXTENDED RELEASE ORAL DAILY
Status: DISCONTINUED | OUTPATIENT
Start: 2022-10-11 | End: 2022-10-28 | Stop reason: HOSPADM

## 2022-10-11 RX ORDER — SALIVA STIMULANT COMB. NO.3
2 SPRAY, NON-AEROSOL (ML) MUCOUS MEMBRANE 4 TIMES DAILY
Status: ON HOLD | COMMUNITY
End: 2022-10-28 | Stop reason: HOSPADM

## 2022-10-11 RX ORDER — ASPIRIN 81 MG/1
81 TABLET ORAL DAILY
Status: DISCONTINUED | OUTPATIENT
Start: 2022-10-11 | End: 2022-10-28 | Stop reason: HOSPADM

## 2022-10-11 RX ORDER — RISPERIDONE 2 MG/1
2 TABLET ORAL 2 TIMES DAILY
Status: ON HOLD | COMMUNITY
End: 2022-10-28 | Stop reason: HOSPADM

## 2022-10-11 RX ORDER — MULTIVITAMIN
1 TABLET ORAL DAILY
COMMUNITY

## 2022-10-11 RX ORDER — 0.9 % SODIUM CHLORIDE 0.9 %
250 INTRAVENOUS SOLUTION INTRAVENOUS ONCE
Status: COMPLETED | OUTPATIENT
Start: 2022-10-11 | End: 2022-10-11

## 2022-10-11 RX ORDER — ASPIRIN 81 MG/1
81 TABLET ORAL DAILY
COMMUNITY

## 2022-10-11 RX ORDER — FOLIC ACID 1 MG/1
1 TABLET ORAL DAILY
Status: DISCONTINUED | OUTPATIENT
Start: 2022-10-11 | End: 2022-10-28 | Stop reason: HOSPADM

## 2022-10-11 RX ORDER — 0.9 % SODIUM CHLORIDE 0.9 %
250 INTRAVENOUS SOLUTION INTRAVENOUS ONCE
Status: DISCONTINUED | OUTPATIENT
Start: 2022-10-11 | End: 2022-10-12

## 2022-10-11 RX ORDER — ENOXAPARIN SODIUM 100 MG/ML
40 INJECTION SUBCUTANEOUS DAILY
Status: DISCONTINUED | OUTPATIENT
Start: 2022-10-11 | End: 2022-10-13

## 2022-10-11 RX ORDER — SODIUM CHLORIDE 9 MG/ML
INJECTION, SOLUTION INTRAVENOUS CONTINUOUS
Status: DISCONTINUED | OUTPATIENT
Start: 2022-10-11 | End: 2022-10-12

## 2022-10-11 RX ORDER — LORAZEPAM 1 MG/1
1 TABLET ORAL EVERY 6 HOURS PRN
Status: DISCONTINUED | OUTPATIENT
Start: 2022-10-11 | End: 2022-10-19

## 2022-10-11 RX ORDER — M-VIT,TX,IRON,MINS/CALC/FOLIC 27MG-0.4MG
1 TABLET ORAL DAILY
Status: ON HOLD | COMMUNITY
End: 2022-10-12

## 2022-10-11 RX ORDER — ACETAMINOPHEN 325 MG/1
650 TABLET ORAL EVERY 6 HOURS PRN
Status: DISCONTINUED | OUTPATIENT
Start: 2022-10-11 | End: 2022-10-28 | Stop reason: HOSPADM

## 2022-10-11 RX ORDER — LORAZEPAM 2 MG/ML
1 INJECTION INTRAMUSCULAR EVERY 6 HOURS PRN
Status: ON HOLD | COMMUNITY
End: 2022-10-28 | Stop reason: HOSPADM

## 2022-10-11 RX ORDER — POTASSIUM CHLORIDE AND SODIUM CHLORIDE 900; 300 MG/100ML; MG/100ML
INJECTION, SOLUTION INTRAVENOUS CONTINUOUS
Status: DISCONTINUED | OUTPATIENT
Start: 2022-10-11 | End: 2022-10-12

## 2022-10-11 RX ORDER — SODIUM CHLORIDE 9 MG/ML
25 INJECTION, SOLUTION INTRAVENOUS PRN
Status: DISCONTINUED | OUTPATIENT
Start: 2022-10-11 | End: 2022-10-28 | Stop reason: HOSPADM

## 2022-10-11 RX ORDER — ONDANSETRON 4 MG/1
4 TABLET, ORALLY DISINTEGRATING ORAL EVERY 8 HOURS PRN
Status: DISCONTINUED | OUTPATIENT
Start: 2022-10-11 | End: 2022-10-28 | Stop reason: HOSPADM

## 2022-10-11 RX ORDER — INSULIN LISPRO 100 [IU]/ML
0-4 INJECTION, SOLUTION INTRAVENOUS; SUBCUTANEOUS NIGHTLY
Status: DISCONTINUED | OUTPATIENT
Start: 2022-10-11 | End: 2022-10-12

## 2022-10-11 RX ORDER — ONDANSETRON 2 MG/ML
4 INJECTION INTRAMUSCULAR; INTRAVENOUS EVERY 6 HOURS PRN
Status: DISCONTINUED | OUTPATIENT
Start: 2022-10-11 | End: 2022-10-28 | Stop reason: HOSPADM

## 2022-10-11 RX ORDER — ZIPRASIDONE MESYLATE 20 MG/ML
20 INJECTION, POWDER, LYOPHILIZED, FOR SOLUTION INTRAMUSCULAR EVERY 12 HOURS PRN
Status: ON HOLD | COMMUNITY
End: 2022-10-11

## 2022-10-11 RX ORDER — PRAVASTATIN SODIUM 20 MG
40 TABLET ORAL DAILY
Status: DISCONTINUED | OUTPATIENT
Start: 2022-10-11 | End: 2022-10-28 | Stop reason: HOSPADM

## 2022-10-11 RX ORDER — ACETAMINOPHEN 650 MG/1
650 SUPPOSITORY RECTAL EVERY 6 HOURS PRN
Status: DISCONTINUED | OUTPATIENT
Start: 2022-10-11 | End: 2022-10-28 | Stop reason: HOSPADM

## 2022-10-11 RX ORDER — SODIUM CHLORIDE 0.9 % (FLUSH) 0.9 %
5-40 SYRINGE (ML) INJECTION EVERY 12 HOURS SCHEDULED
Status: DISCONTINUED | OUTPATIENT
Start: 2022-10-11 | End: 2022-10-28 | Stop reason: HOSPADM

## 2022-10-11 RX ORDER — METOPROLOL TARTRATE 50 MG/1
50 TABLET, FILM COATED ORAL 2 TIMES DAILY
Status: ON HOLD | COMMUNITY
End: 2022-10-12

## 2022-10-11 RX ORDER — METOPROLOL TARTRATE 50 MG/1
50 TABLET, FILM COATED ORAL 2 TIMES DAILY
Status: DISCONTINUED | OUTPATIENT
Start: 2022-10-11 | End: 2022-10-28 | Stop reason: HOSPADM

## 2022-10-11 RX ORDER — LACTULOSE 10 G/15ML
20 SOLUTION ORAL; RECTAL 4 TIMES DAILY
COMMUNITY

## 2022-10-11 RX ORDER — MENTHOL AND ZINC OXIDE .44; 20.625 G/100G; G/100G
OINTMENT TOPICAL 2 TIMES DAILY
Status: ON HOLD | COMMUNITY
End: 2022-10-12

## 2022-10-11 RX ORDER — BENZTROPINE MESYLATE 1 MG/1
1 TABLET ORAL 2 TIMES DAILY
Status: DISCONTINUED | OUTPATIENT
Start: 2022-10-11 | End: 2022-10-28 | Stop reason: HOSPADM

## 2022-10-11 RX ORDER — SODIUM CHLORIDE 9 MG/ML
INJECTION, SOLUTION INTRAVENOUS CONTINUOUS
Status: DISCONTINUED | OUTPATIENT
Start: 2022-10-11 | End: 2022-10-11 | Stop reason: DRUGHIGH

## 2022-10-11 RX ORDER — PANTOPRAZOLE SODIUM 40 MG/1
40 TABLET, DELAYED RELEASE ORAL
Status: DISCONTINUED | OUTPATIENT
Start: 2022-10-12 | End: 2022-10-28 | Stop reason: HOSPADM

## 2022-10-11 RX ORDER — INSULIN LISPRO 100 [IU]/ML
0-4 INJECTION, SOLUTION INTRAVENOUS; SUBCUTANEOUS
Status: DISCONTINUED | OUTPATIENT
Start: 2022-10-11 | End: 2022-10-12

## 2022-10-11 RX ORDER — LORAZEPAM 1 MG/1
1 TABLET ORAL EVERY 6 HOURS PRN
Status: ON HOLD | COMMUNITY
End: 2022-10-28 | Stop reason: HOSPADM

## 2022-10-11 RX ADMIN — SODIUM CHLORIDE 250 ML: 9 INJECTION, SOLUTION INTRAVENOUS at 09:43

## 2022-10-11 RX ADMIN — SODIUM CHLORIDE 100 ML/HR: 9 INJECTION, SOLUTION INTRAVENOUS at 10:22

## 2022-10-11 RX ADMIN — POTASSIUM CHLORIDE AND SODIUM CHLORIDE: 900; 300 INJECTION, SOLUTION INTRAVENOUS at 18:45

## 2022-10-11 RX ADMIN — LORAZEPAM 1 MG: 1 TABLET ORAL at 21:45

## 2022-10-11 RX ADMIN — SODIUM CHLORIDE 250 ML: 9 INJECTION, SOLUTION INTRAVENOUS at 10:05

## 2022-10-11 ASSESSMENT — PAIN SCALES - PAIN ASSESSMENT IN ADVANCED DEMENTIA (PAINAD)
FACIALEXPRESSION: 0
CONSOLABILITY: 0
TOTALSCORE: 0
BODYLANGUAGE: 0
BODYLANGUAGE: 0
NEGVOCALIZATION: 0
BREATHING: 0
FACIALEXPRESSION: 0
TOTALSCORE: 0
TOTALSCORE: 0
BREATHING: 0
NEGVOCALIZATION: 0
BREATHING: 0
CONSOLABILITY: 0
NEGVOCALIZATION: 0
FACIALEXPRESSION: 0
CONSOLABILITY: 0
BODYLANGUAGE: 0

## 2022-10-11 NOTE — H&P
[facility]  History and Physical        Patient:  Florentino Huang  MRN: [id number]    Chief Complaint:    Chief Complaint   Patient presents with    Altered Mental Status     Na+ 161 per [facility]       History Obtained From:  electronic medical record  PCP: Miguel Georges DO    History of Present Illness: The patient is a 74 y.o. female , [facility] resident ,was at [address] [place] [address] for her behavioral issues who presents with Anisha Alvarado to er. Her labs showed hypernatremia with elevated bun/cr at the [facility] and repeat testing showed worsening. She was hypotensive initially and bp improved with initial hydration. Past Medical History:        Diagnosis Date    Depression     Diabetes mellitus (HCC)     GERD (gastroesophageal reflux disease)     Hepatitis C     WITH FULL RECOVERY - INFECTIOUS DISEASE DR. Екатерина Jimenez - LAST VISIT 7/2020    Hyperlipidemia     Hypertension     Irregular heartbeat     Murmur, cardiac     noted by PCP, no echo per patient    Urinary bladder incontinence     Wears eyeglasses     Well adult health check     PCP - DR. Lashanda Dominique - 3/2021    [facility] adult health check     INFECTIOUS DISEASE - DR. Alec Mcconnell    [facility] adult health check     GI - DR. Dagoberto LEONARD       Past Surgical History:        Procedure Laterality Date    BREAST SURGERY Right 1975    REMOVAL BENIGN TUMOR    COLONOSCOPY      HIP SURGERY Right 1964    RECONSTRUCTION    TONSILLECTOMY      AS A CHILD    TUBAL LIGATION  1975    VITRECTOMY Left 04/22/2021    VITRECTOMY Left 4/22/2021    VITRECTOMY 25 GAUGE, MEMBRANE PEEL, ICG performed by Mario Dunne MD at [place] [address]       Medications Prior to Admission:    Prior to Admission medications    Medication Sig Start Date End Date Taking?  Authorizing Provider   aspirin 81 MG EC tablet Take 81 mg by mouth daily   Yes Historical Provider, MD   empagliflozin (JARDIANCE) 25 MG tablet Take 25 mg by mouth daily   Yes Historical Provider, MD   folic acid (FOLVITE) 1 MG tablet Take 1 mg by mouth daily   Yes Historical Provider, MD   Multiple Vitamins-Minerals (THERAPEUTIC MULTIVITAMIN-MINERALS) tablet Take 1 tablet by mouth daily   Yes Historical Provider, MD   potassium chloride (KLOR-CON M) 10 MEQ extended release tablet Take 10 mEq by mouth daily For hypokalemia. Yes Historical Provider, MD   risperiDONE (RISPERDAL) 2 MG tablet Take 2 mg by mouth 2 times daily   Yes Historical Provider, MD   divalproex (DEPAKOTE SPRINKLE) 125 MG capsule Take 125 mg by mouth in the morning, at noon, and at bedtime   Yes Historical Provider, MD   insulin lispro (HUMALOG) 100 UNIT/ML injection vial Inject into the skin 4 times daily (after meals and at bedtime) Sliding scale:    0-150 =0  151-200= 2 units  201-250= 4 units  251-300= 6 units  301-350= 8 units  351-400= 10 units  401-450=12 units  451-500=14 units  >500:  Call MD   Yes Historical Provider, MD   LORazepam (ATIVAN) 2 MG/ML injection Infuse 1 mg intravenously every 6 hours as needed (anxiety). Yes Historical Provider, MD   metoprolol tartrate (LOPRESSOR) 50 MG tablet Take 50 mg by mouth 2 times daily   Yes Historical Provider, MD   LORazepam (ATIVAN) 1 MG tablet Take 1 mg by mouth every 6 hours as needed for Anxiety.    Yes Historical Provider, MD   menthol-zinc oxide (CALMOSEPTINE) 0.44-20.625 % OINT ointment Apply topically 2 times daily And PRN   Yes Historical Provider, MD   Dulaglutide 1.5 MG/0.5ML SOPN Inject 1.5 mg into the skin once a week Fridays   Yes Historical Provider, MD   Multiple Vitamin TABS Take 1 tablet by mouth daily   Yes Historical Provider, MD   benztropine (COGENTIN) 1 MG tablet Take 1 mg by mouth 2 times daily   Yes Historical Provider, MD   saliva substitute (BIOTENE/MOUTH KOTE) SOLN liquid Take 2 sprays by mouth 4 times daily   Yes Historical Provider, MD   lactulose encephalopathy 10 GM/15ML SOLN solution Take 20 g by mouth 4 times daily   Yes Historical Provider, MD furosemide (LASIX) 40 MG tablet Take 40 mg by mouth daily    Historical Provider, MD   vitamin B-1 (THIAMINE) 100 MG tablet Take 100 mg by mouth daily    Historical Provider, MD   pravastatin (PRAVACHOL) 40 MG tablet Take 40 mg by mouth daily    Historical Provider, MD   losartan (COZAAR) 25 MG tablet Take 25 mg by mouth daily    Historical Provider, MD   glipiZIDE (GLUCOTROL XL) 5 MG extended release tablet Take 5 mg by mouth daily    Historical Provider, MD   VORTIoxetine HBr (TRINTELLIX) 20 MG TABS tablet Take 20 mg by mouth daily    Historical Provider, MD   omeprazole (PRILOSEC) 20 MG delayed release capsule Take 20 mg by mouth nightly    Historical Provider, MD       Allergies:  Lisinopril    Social History:   TOBACCO:   reports that she quit smoking about 26 years ago. She has never used smokeless tobacco.  ETOH:   has no history on file for alcohol use. Family History:   History reviewed. No pertinent family history. Review of Systems:  Constitutional:negative  for fevers, and negative for chills. Respiratory: negative for shortness of breath, negative for cough, and negative for wheezing  Cardiovascular: negative for chest pain, negative for palpitations, and negative for syncope  Gastrointestinal: negative for abdominal pain, negative for nausea,negative for vomiting, negative for diarrhea, negative for constipation, and negative for hematochezia or melena  Genitourinary: negative for dysuria, negative for urinary urgency, negative for urinary frequency, and negative for hematuria  Neurological: negative for unilateral weakness, numbness or tingling.     All other systems were reviewed with the patient and are negative except as stated    Objective:    Vitals:   Temp: 97.1 °F (36.2 °C)  BP: 134/77  Resp: 18  Heart Rate: 86  SpO2: 97 %  -----------------------------------------------------------------  Exam:  GEN:    Agitated, restless in bed,non verbal .   EYES:  EOMI, pupils equal   Mouth- dry mucous membranes  NECK: Supple. No lymphadenopathy. No carotid bruit  CVS:    regular rate and rhythm, no audible murmur  PULM:  CTA, no wheezes, rales or rhonchi, no acute respiratory distress  ABD:    Bowels sounds normal.  Abdomen is soft. No distention. no tenderness to palpation. EXT:   no edema bilaterally . No calf tenderness. NEURO: Moves all extremities. Motor and sensory are grossly intact  SKIN:  No rashes. No skin lesions.    -----------------------------------------------------------------  Diagnostic Data:   All diagnostic data was reviewed  Lab Results   Component Value Date    WBC 13.2 (H) 10/11/2022    HGB 15.9 (H) 10/11/2022    MCV 93.5 10/11/2022     (L) 10/11/2022      Lab Results   Component Value Date    GLUCOSE 262 (H) 10/11/2022    BUN 64 (H) 10/11/2022    CREATININE 1.97 (H) 10/11/2022     (H) 10/11/2022    K 3.4 (L) 10/11/2022    CALCIUM 9.6 10/11/2022     (HH) 10/11/2022    CO2 26 10/11/2022     Lab Results   Component Value Date    WBCUA 0 TO 2 10/11/2022    RBCUA 0 TO 2 10/11/2022    EPITHUA 0 TO 2 10/11/2022    LEUKOCYTESUR NEGATIVE 10/11/2022    SPECGRAV 1.020 10/11/2022    GLUCOSEU 3+ (A) 10/11/2022    KETUA NEGATIVE 10/11/2022    PROTEINU NEGATIVE 10/11/2022    HGBUR NEGATIVE 10/11/2022    BACTERIA 4+ (A) 10/11/2022       Assessment:     Principal Problem:    Hypernatremia  Active Problems:    Dehydration    Major depressive disorder with psychotic features ([facility] [address]    Type 2 diabetes mellitus without complication (HCC)    Essential hypertension    ASHD (arteriosclerotic heart disease)    Gastroesophageal reflux disease without esophagitis  Resolved Problems:    * No resolved hospital problems.  *      Plan:     Problem List       * (Principal) Hypernatremia - Primary    Dehydration        This patient requires inpatient admission because of severe hypernatremia due to dehydration requiring iv fluids  Factors affecting the medical complexity of this patient include hypotension,type 2 dm,htn,depression with psychosis  Estimated length of stay is 2 days  Discussed patient's symptoms and data results including labs and imaging studies with the ER MD at time of admission  High risk drug monitoring: none      CORE MEASURES  DVT prophylaxis: Lovenox  Decubitus ulcer present on admission: No  CODE STATUS: FULL CODE  Nutrition Status: poor  Physical therapy: Yes   Old Charts reviewed: Yes  EKG Reviewed:  Yes  Advance Directive Addressed: Yes    Martha Nicole MD , M.D.  10/11/2022  5:39 PM

## 2022-10-11 NOTE — ED NOTES
Left messages for Karen Matt and Melissa Cook to inform them about the admit.      Norman Wilson  10/11/22 3007

## 2022-10-11 NOTE — ED PROVIDER NOTES
[facility] ED  EMERGENCY DEPARTMENT ENCOUNTER      Pt Name: Palmira Cortés  MRN: [id number]  [place] 1955  Date of evaluation: 10/11/2022  Provider: Keyon Fulton MD    [address] [place] [address]       Chief Complaint   Patient presents with    Altered Mental Status     Na+ 161 per [facility]         HISTORY OF PRESENT ILLNESS   (Location/Symptom, Timing/Onset, Context/Setting, Quality, Duration, Modifying Factors, Severity)  Note limiting factors. Palmira Cortés is a 74 y.o. female who presents to the emergency department      Sent from [facility] for elevated sodium. Patient currently resides at [facility]. Patient labs reportedly showed a sodium of 161. Arrival patient appeared very dehydrated. She is minimally responsive. She was breathing spontaneously without labored respirations. Her oxygen saturation is 95% on room air. Uncertain neurological baseline. No other reported concerns per EMS. Nursing Notes were reviewed. REVIEW OF SYSTEMS    (2-9 systems for level 4, 10 or more for level 5)     Review of Systems   Reason unable to perform ROS: altered mental status. Except as noted above the remainder of the review of systems was reviewed and negative. PAST MEDICAL HISTORY     Past Medical History:   Diagnosis Date    Depression     Diabetes mellitus (HCC)     GERD (gastroesophageal reflux disease)     Hepatitis C     WITH FULL RECOVERY - INFECTIOUS DISEASE DR. Luciana Benz - LAST VISIT 7/2020    Hyperlipidemia     Hypertension     Irregular heartbeat     Murmur, cardiac     noted by PCP, no echo per patient    Urinary bladder incontinence     Wears eyeglasses     Well adult health check     PCP - DR. Ivelisse Luis - 3/2021    Well adult health check     INFECTIOUS DISEASE - DR. Simone Galindo    Well adult health check     GI - DR. Godwin LEONARD         SURGICAL HISTORY       Past Surgical History:   Procedure Laterality Date    BREAST SURGERY Right 1975 REMOVAL BENIGN TUMOR    COLONOSCOPY      HIP SURGERY Right 1964    RECONSTRUCTION    TONSILLECTOMY      AS A CHILD    TUBAL LIGATION  1975    VITRECTOMY Left 04/22/2021    VITRECTOMY Left 4/22/2021    VITRECTOMY 25 GAUGE, MEMBRANE PEEL, ICG performed by Lori Luna MD at [facility]       Previous Medications    ARIPIPRAZOLE (ABILIFY) 10 MG TABLET    Take 10 mg by mouth daily    ASPIRIN 81 MG EC TABLET    Take 81 mg by mouth daily    BENZTROPINE (COGENTIN) 1 MG TABLET    Take 1 mg by mouth 2 times daily    DIVALPROEX (DEPAKOTE SPRINKLE) 125 MG CAPSULE    Take 125 mg by mouth in the morning, at noon, and at bedtime    EMPAGLIFLOZIN (JARDIANCE) 25 MG TABLET    Take 25 mg by mouth daily    FOLIC ACID (FOLVITE) 1 MG TABLET    Take 1 mg by mouth daily    FUROSEMIDE (LASIX) 40 MG TABLET    Take 40 mg by mouth daily    GLIPIZIDE (GLUCOTROL XL) 5 MG EXTENDED RELEASE TABLET    Take 5 mg by mouth 2 times daily    HALOPERIDOL (HALDOL) 5 MG TABLET    Take 5 mg by mouth 4 times daily as needed for Agitation    INSULIN LISPRO (HUMALOG) 100 UNIT/ML INJECTION VIAL    Inject into the skin 3 times daily (before meals)    LORAZEPAM (ATIVAN) 2 MG/ML INJECTION    Infuse 1 mg intravenously every 4 hours. Inject 1 mg 6 times daily as needed for anxiety. LOSARTAN (COZAAR) 25 MG TABLET    Take 25 mg by mouth daily    METOPROLOL SUCCINATE (TOPROL XL) 50 MG EXTENDED RELEASE TABLET    Take 50 mg by mouth daily    MIRTAZAPINE (REMERON) 15 MG TABLET    Take 15 mg by mouth nightly    MULTIPLE VITAMINS-MINERALS (THERAPEUTIC MULTIVITAMIN-MINERALS) TABLET    Take 1 tablet by mouth daily    OMEPRAZOLE (PRILOSEC) 20 MG DELAYED RELEASE CAPSULE    Take 20 mg by mouth daily    POTASSIUM CHLORIDE (KLOR-CON M) 10 MEQ EXTENDED RELEASE TABLET    Take 10 mEq by mouth daily For hypokalemia.     PRAVASTATIN (PRAVACHOL) 40 MG TABLET    Take 40 mg by mouth daily    RISPERIDONE (RISPERDAL) 2 MG TABLET    Take 2 mg by mouth 2 times daily    VITAMIN B-1 (THIAMINE) 100 MG TABLET    Take 100 mg by mouth daily    VORTIOXETINE HBR (TRINTELLIX) 20 MG TABS TABLET    Take 10 mg by mouth nightly    ZIPRASIDONE (GEODON) 20 MG INJECTION    Inject 20 mg into the muscle every 12 hours as needed for Agitation       ALLERGIES     Lisinopril    FAMILY HISTORY     No family history on file. SOCIAL HISTORY       Social History     Socioeconomic History    Marital status: Single   Tobacco Use    Smoking status: Former     Types: Cigarettes     Quit date:      Years since quittin.7    Smokeless tobacco: Never   Vaping Use    Vaping Use: Never used       SCREENINGS        Zafar Coma Scale  Eye Opening: Spontaneous  Best Verbal Response: Oriented  Best Motor Response: Obeys commands  Posen Coma Scale Score: 15               PHYSICAL EXAM    (up to 7 for level 4, 8 or more for level 5)     ED Triage Vitals [10/11/22 0924]   BP Temp Temp Source Heart Rate Resp SpO2 Height Weight   (!) 85/55 98.5 °F (36.9 °C) Tympanic 90 (!) 9 92 % -- --       Physical Exam  Vitals and nursing note reviewed. Constitutional:       Comments: Afebrile. Patient is minimally responsive. Respirations are spontaneous and nonlabored. Nontoxic-appearing. Hypotensive heart rate 85/55   HENT:      Head: Normocephalic and atraumatic. Eyes:      General: No scleral icterus. Pupils: Pupils are equal, round, and reactive to light. Cardiovascular:      Rate and Rhythm: Normal rate and regular rhythm. Neurological:      Comments: Spontaneous nonlabored respirations. She is minimally responsive. Uncertain baseline.    Psychiatric:      Comments: Unable to assess       DIAGNOSTIC RESULTS     EKG: All EKG's are interpreted by the Emergency Department Physician who either signs or Co-signs this chart in the absence of a cardiologist.        RADIOLOGY:   Non-plain film images such as CT, Ultrasound and MRI are read by the radiologist. Plain radiographic images are visualized and preliminarily interpreted by the emergency physician with the below findings:        Interpretation per the Radiologist below, if available at the time of this note:    XR CHEST PORTABLE   Final Result   No acute cardiopulmonary process         CT Head W/O Contrast   Final Result   No acute intracranial abnormality. Prominent senescent changes. These appear more prominent than usually seen   in a patient of this age, but could be due to old injuries, various   medications, or history of substance abuse.                ED BEDSIDE ULTRASOUND:   Performed by ED Physician - none    LABS:  Labs Reviewed   CBC WITH AUTO DIFFERENTIAL - Abnormal; Notable for the following components:       Result Value    WBC 13.2 (*)     RBC 5.42 (*)     Hemoglobin 15.9 (*)     Hematocrit 50.7 (*)     Platelets 078 (*)     Seg Neutrophils 76 (*)     Lymphocytes 12 (*)     Eosinophils % 0 (*)     Segs Absolute 10.04 (*)     Absolute Mono # 1.58 (*)     All other components within normal limits   URINALYSIS WITH REFLEX TO CULTURE - Abnormal; Notable for the following components:    Turbidity UA SLIGHTLY CLOUDY (*)     Glucose, Ur 3+ (*)     All other components within normal limits   COMPREHENSIVE METABOLIC PANEL - Abnormal; Notable for the following components:    Glucose 348 (*)     BUN 72 (*)     Creatinine 2.50 (*)     Est, Glom Filt Rate 21 (*)     Bun/Cre Ratio 29 (*)     Sodium 157 (*)     Chloride 114 (*)     Anion Gap 18 (*)     ALT 53 (*)     AST 59 (*)     Total Bilirubin 1.6 (*)     All other components within normal limits   PROTIME-INR - Abnormal; Notable for the following components:    Protime 17.3 (*)     All other components within normal limits   URINE DRUG SCREEN - Abnormal; Notable for the following components:    Benzodiazepine Screen, Urine POSITIVE (*)     All other components within normal limits   MAGNESIUM - Abnormal; Notable for the following components:    Magnesium 3.1 (*)     All other components within normal limits   PHOSPHORUS - Abnormal; Notable for the following components:    Phosphorus 5.8 (*)     All other components within normal limits   LACTATE, SEPSIS - Abnormal; Notable for the following components:    Lactic Acid, Sepsis 2.5 (*)     All other components within normal limits   BASIC METABOLIC PANEL - Abnormal; Notable for the following components:    Glucose 397 (*)     BUN 67 (*)     Creatinine 2.17 (*)     Est, Glom Filt Rate 25 (*)     Bun/Cre Ratio 31 (*)     Sodium 158 (*)     Potassium 3.5 (*)     Chloride 118 (*)     All other components within normal limits   MICROSCOPIC URINALYSIS - Abnormal; Notable for the following components:    Bacteria, UA 4+ (*)     All other components within normal limits   COVID-19, RAPID   CULTURE, BLOOD 1   CULTURE, BLOOD 2   BLOOD GAS, VENOUS   LACTATE, SEPSIS   CALCIUM, IONIZED   BASIC METABOLIC PANEL       All other labs were within normal range or not returned as of this dictation. EMERGENCY DEPARTMENT COURSE and DIFFERENTIAL DIAGNOSIS/MDM:   Vitals:    Vitals:    10/11/22 1359 10/11/22 1400 10/11/22 1401 10/11/22 1418   BP:   (!) 140/73 116/64   Pulse:   87 83   Resp:   23 19   Temp:       TempSrc:       SpO2: 97% 97%  95%           MDM  Number of Diagnoses or Management Options  Altered mental status, unspecified altered mental status type  Dehydration  Hypernatremia  Diagnosis management comments: 68-year-old female sent from [address] [place] [address] for elevated sodium. Initial sodium here was 157. Elevated glucose. Anion gap slightly elevated. VBG's are normal.  IV fluids initiated. Initial 250 mL bolus given. Patient's blood pressure did respond significantly with systolic blood pressure several 100 patient did receive a second 200 mL bolus. Arnold catheter was placed to monitor urine output. Initially only a small amount of very dark urine does not appear to be infected based on UA.   After second bolus patient's fluids initiated at 100 mL an hour of normal saline blood pressure did begin to decrease with systolic blood pressure in the ED again. Repeat 250 mL bolus was ordered and patient continues to run now 100 mL an hour. Her mental status is improving. She opens her eyes spontaneously and is moving her extremities spontaneously. Still unknown baseline mental status. And creatinine improving with fluids. Patient remains hypernatremic. Continuing IV hydration. Discussed with Dr. Shruthi Ordoñez and patient will be admitted for continued management. MIPS       REASSESSMENT          CRITICAL CARE TIME   Total Critical Care time was  45 minutes, excluding separately reportable procedures. There was a high probability of clinically significant/life threatening deterioration in the patient's condition which required my urgent intervention. CONSULTS:  None    PROCEDURES:  Unless otherwise noted below, none     Procedures        FINAL IMPRESSION      1. Hypernatremia    2. Dehydration    3. Altered mental status, unspecified altered mental status type          DISPOSITION/PLAN   DISPOSITION Decision To Admit 10/11/2022 02:11:03 PM      PATIENT REFERRED TO:  No follow-up provider specified. DISCHARGE MEDICATIONS:  New Prescriptions    No medications on file     Controlled Substances Monitoring:     No flowsheet data found.     (Please note that portions of this note were completed with a voice recognition program.  Efforts were made to edit the dictations but occasionally words are mis-transcribed.)    Lux Horta MD (electronically signed)  Attending Emergency Physician             Lux Horta MD  10/11/22 8731

## 2022-10-12 LAB
ABSOLUTE EOS #: <0.03 K/UL (ref 0–0.44)
ABSOLUTE IMMATURE GRANULOCYTE: 0.09 K/UL (ref 0–0.3)
ABSOLUTE LYMPH #: 1.64 K/UL (ref 1.1–3.7)
ABSOLUTE MONO #: 1.41 K/UL (ref 0.1–1.2)
ALBUMIN SERPL-MCNC: 3.5 G/DL (ref 3.5–5.2)
ALBUMIN/GLOBULIN RATIO: 1.1 (ref 1–2.5)
ALP BLD-CCNC: 58 U/L (ref 35–104)
ALT SERPL-CCNC: 56 U/L (ref 5–33)
ANION GAP SERPL CALCULATED.3IONS-SCNC: 13 MMOL/L (ref 9–17)
AST SERPL-CCNC: 53 U/L
BASOPHILS # BLD: 0 % (ref 0–2)
BASOPHILS ABSOLUTE: 0.05 K/UL (ref 0–0.2)
BILIRUB SERPL-MCNC: 1 MG/DL (ref 0.3–1.2)
BUN BLDV-MCNC: 47 MG/DL (ref 8–23)
BUN/CREAT BLD: 37 (ref 9–20)
CALCIUM SERPL-MCNC: 8.5 MG/DL (ref 8.6–10.4)
CHLORIDE BLD-SCNC: 130 MMOL/L (ref 98–107)
CHLORIDE BLD-SCNC: 130 MMOL/L (ref 98–107)
CO2: 24 MMOL/L (ref 20–31)
CREAT SERPL-MCNC: 1.28 MG/DL (ref 0.5–0.9)
EOSINOPHILS RELATIVE PERCENT: 0 % (ref 1–4)
ESTIMATED AVERAGE GLUCOSE: 148 MG/DL
GFR SERPL CREATININE-BSD FRML MDRD: 46 ML/MIN/1.73M2
GLUCOSE BLD-MCNC: 153 MG/DL (ref 74–100)
GLUCOSE BLD-MCNC: 187 MG/DL (ref 70–99)
GLUCOSE BLD-MCNC: 278 MG/DL (ref 74–100)
GLUCOSE BLD-MCNC: 292 MG/DL (ref 74–100)
GLUCOSE BLD-MCNC: 295 MG/DL (ref 74–100)
HBA1C MFR BLD: 6.8 % (ref 4–6)
HCT VFR BLD CALC: 46.2 % (ref 36.3–47.1)
HEMOGLOBIN: 14.6 G/DL (ref 11.9–15.1)
IMMATURE GRANULOCYTES: 1 %
LYMPHOCYTES # BLD: 13 % (ref 24–43)
MCH RBC QN AUTO: 30.1 PG (ref 25.2–33.5)
MCHC RBC AUTO-ENTMCNC: 31.6 G/DL (ref 28.4–34.8)
MCV RBC AUTO: 95.3 FL (ref 82.6–102.9)
MONOCYTES # BLD: 11 % (ref 3–12)
NRBC AUTOMATED: 0 PER 100 WBC
OSMOLALITY URINE: 780 MOSM/KG (ref 80–1300)
PDW BLD-RTO: 13.5 % (ref 11.8–14.4)
PLATELET # BLD: ABNORMAL K/UL (ref 138–453)
PLATELET, FLUORESCENCE: 77 K/UL (ref 138–453)
PLATELET, IMMATURE FRACTION: 1.2 % (ref 1.1–10.3)
POTASSIUM SERPL-SCNC: 4.3 MMOL/L (ref 3.7–5.3)
RBC # BLD: 4.85 M/UL (ref 3.95–5.11)
SEG NEUTROPHILS: 74 % (ref 36–65)
SEGMENTED NEUTROPHILS ABSOLUTE COUNT: 9.27 K/UL (ref 1.5–8.1)
SODIUM BLD-SCNC: 163 MMOL/L (ref 135–144)
SODIUM BLD-SCNC: 167 MMOL/L (ref 135–144)
TOTAL PROTEIN: 6.7 G/DL (ref 6.4–8.3)
UREA NITROGEN, UR: 1113 MG/DL
WBC # BLD: 12.5 K/UL (ref 3.5–11.3)

## 2022-10-12 PROCEDURE — 97530 THERAPEUTIC ACTIVITIES: CPT

## 2022-10-12 PROCEDURE — 94761 N-INVAS EAR/PLS OXIMETRY MLT: CPT

## 2022-10-12 PROCEDURE — 2580000003 HC RX 258: Performed by: NURSE PRACTITIONER

## 2022-10-12 PROCEDURE — 97162 PT EVAL MOD COMPLEX 30 MIN: CPT

## 2022-10-12 PROCEDURE — 82947 ASSAY GLUCOSE BLOOD QUANT: CPT

## 2022-10-12 PROCEDURE — 6360000002 HC RX W HCPCS: Performed by: NURSE PRACTITIONER

## 2022-10-12 PROCEDURE — 84295 ASSAY OF SERUM SODIUM: CPT

## 2022-10-12 PROCEDURE — 6370000000 HC RX 637 (ALT 250 FOR IP): Performed by: FAMILY MEDICINE

## 2022-10-12 PROCEDURE — 97166 OT EVAL MOD COMPLEX 45 MIN: CPT

## 2022-10-12 PROCEDURE — 80053 COMPREHEN METABOLIC PANEL: CPT

## 2022-10-12 PROCEDURE — 1200000000 HC SEMI PRIVATE

## 2022-10-12 PROCEDURE — 82435 ASSAY OF BLOOD CHLORIDE: CPT

## 2022-10-12 PROCEDURE — 36415 COLL VENOUS BLD VENIPUNCTURE: CPT

## 2022-10-12 PROCEDURE — 85055 RETICULATED PLATELET ASSAY: CPT

## 2022-10-12 PROCEDURE — 85025 COMPLETE CBC W/AUTO DIFF WBC: CPT

## 2022-10-12 PROCEDURE — 6360000002 HC RX W HCPCS: Performed by: FAMILY MEDICINE

## 2022-10-12 PROCEDURE — 6370000000 HC RX 637 (ALT 250 FOR IP): Performed by: NURSE PRACTITIONER

## 2022-10-12 RX ORDER — ZIPRASIDONE MESYLATE 20 MG/ML
20 INJECTION, POWDER, LYOPHILIZED, FOR SOLUTION INTRAMUSCULAR EVERY 12 HOURS PRN
Status: ON HOLD | COMMUNITY
End: 2022-10-28 | Stop reason: HOSPADM

## 2022-10-12 RX ORDER — BENZTROPINE MESYLATE 2 MG/1
2 TABLET ORAL DAILY
Status: ON HOLD | COMMUNITY
End: 2022-10-28 | Stop reason: HOSPADM

## 2022-10-12 RX ORDER — ARIPIPRAZOLE 15 MG/1
15 TABLET ORAL DAILY
COMMUNITY

## 2022-10-12 RX ORDER — DEXTROSE MONOHYDRATE 50 MG/ML
INJECTION, SOLUTION INTRAVENOUS CONTINUOUS
Status: DISCONTINUED | OUTPATIENT
Start: 2022-10-12 | End: 2022-10-15

## 2022-10-12 RX ORDER — HALOPERIDOL 5 MG/ML
INJECTION INTRAMUSCULAR EVERY 6 HOURS PRN
Status: ON HOLD | COMMUNITY
End: 2022-10-28 | Stop reason: HOSPADM

## 2022-10-12 RX ORDER — HALOPERIDOL 5 MG/1
5 TABLET ORAL EVERY 6 HOURS PRN
Status: ON HOLD | COMMUNITY
End: 2022-10-28 | Stop reason: SDUPTHER

## 2022-10-12 RX ORDER — LEVOFLOXACIN 5 MG/ML
500 INJECTION, SOLUTION INTRAVENOUS EVERY 24 HOURS
Status: DISCONTINUED | OUTPATIENT
Start: 2022-10-12 | End: 2022-10-13

## 2022-10-12 RX ORDER — BUPROPION HYDROCHLORIDE 150 MG/1
150 TABLET ORAL EVERY MORNING
Status: ON HOLD | COMMUNITY
End: 2022-10-28 | Stop reason: HOSPADM

## 2022-10-12 RX ORDER — INSULIN LISPRO 100 [IU]/ML
0-4 INJECTION, SOLUTION INTRAVENOUS; SUBCUTANEOUS NIGHTLY
Status: DISCONTINUED | OUTPATIENT
Start: 2022-10-12 | End: 2022-10-28 | Stop reason: HOSPADM

## 2022-10-12 RX ORDER — INSULIN LISPRO 100 [IU]/ML
0-8 INJECTION, SOLUTION INTRAVENOUS; SUBCUTANEOUS
Status: DISCONTINUED | OUTPATIENT
Start: 2022-10-12 | End: 2022-10-28 | Stop reason: HOSPADM

## 2022-10-12 RX ADMIN — INSULIN LISPRO 4 UNITS: 100 INJECTION, SOLUTION INTRAVENOUS; SUBCUTANEOUS at 12:55

## 2022-10-12 RX ADMIN — LEVOFLOXACIN 500 MG: 5 INJECTION, SOLUTION INTRAVENOUS at 15:21

## 2022-10-12 RX ADMIN — PANTOPRAZOLE SODIUM 40 MG: 40 TABLET, DELAYED RELEASE ORAL at 06:45

## 2022-10-12 RX ADMIN — BENZTROPINE MESYLATE 1 MG: 1 TABLET ORAL at 08:52

## 2022-10-12 RX ADMIN — POTASSIUM CHLORIDE AND SODIUM CHLORIDE: 900; 300 INJECTION, SOLUTION INTRAVENOUS at 01:18

## 2022-10-12 RX ADMIN — DIVALPROEX SODIUM 125 MG: 125 CAPSULE ORAL at 20:08

## 2022-10-12 RX ADMIN — PRAVASTATIN SODIUM 40 MG: 20 TABLET ORAL at 08:52

## 2022-10-12 RX ADMIN — METOPROLOL TARTRATE 50 MG: 50 TABLET, FILM COATED ORAL at 08:52

## 2022-10-12 RX ADMIN — LORAZEPAM 1 MG: 1 TABLET ORAL at 06:45

## 2022-10-12 RX ADMIN — DEXTROSE MONOHYDRATE: 50 INJECTION, SOLUTION INTRAVENOUS at 07:12

## 2022-10-12 RX ADMIN — DIVALPROEX SODIUM 125 MG: 125 CAPSULE ORAL at 15:18

## 2022-10-12 RX ADMIN — THIAMINE HCL TAB 100 MG 100 MG: 100 TAB at 08:52

## 2022-10-12 RX ADMIN — DEXTROSE MONOHYDRATE: 50 INJECTION, SOLUTION INTRAVENOUS at 16:11

## 2022-10-12 RX ADMIN — RISPERIDONE 2 MG: 1 TABLET, FILM COATED ORAL at 20:08

## 2022-10-12 RX ADMIN — FOLIC ACID 1 MG: 1 TABLET ORAL at 08:52

## 2022-10-12 RX ADMIN — ENOXAPARIN SODIUM 40 MG: 100 INJECTION SUBCUTANEOUS at 08:52

## 2022-10-12 RX ADMIN — ASPIRIN 81 MG: 81 TABLET, COATED ORAL at 08:51

## 2022-10-12 RX ADMIN — DIVALPROEX SODIUM 125 MG: 125 CAPSULE ORAL at 08:51

## 2022-10-12 RX ADMIN — METOPROLOL TARTRATE 50 MG: 50 TABLET, FILM COATED ORAL at 20:08

## 2022-10-12 RX ADMIN — RISPERIDONE 2 MG: 1 TABLET, FILM COATED ORAL at 08:52

## 2022-10-12 RX ADMIN — BENZTROPINE MESYLATE 1 MG: 1 TABLET ORAL at 20:08

## 2022-10-12 RX ADMIN — INSULIN LISPRO 4 UNITS: 100 INJECTION, SOLUTION INTRAVENOUS; SUBCUTANEOUS at 17:39

## 2022-10-12 RX ADMIN — POTASSIUM CHLORIDE 10 MEQ: 10 TABLET, EXTENDED RELEASE ORAL at 08:51

## 2022-10-12 NOTE — PROGRESS NOTES
Progress Note    SUBJECTIVE:    Patient seen for f/u of Hypernatremia. She sitting up in chair awakes with stimulation tremors does not follow direction. Patient appears catatonic. ROS: Unable due to altered mental status     All other systems were reviewed with the patient and are negative unless otherwise stated in HPI      OBJECTIVE:      Vitals:   Vitals:    10/12/22 0635   BP: (!) 147/76   Pulse: 87   Resp: 18   Temp: 97.6 °F (36.4 °C)   SpO2: 97%     Weight: 151 lb 6.4 oz (68.7 kg)   Height: 5' 6\" (167.6 cm)     Weight  Wt Readings from Last 3 Encounters:   10/12/22 151 lb 6.4 oz (68.7 kg)   04/22/21 180 lb (81.6 kg)     Body mass index is 24.44 kg/m². 24HR INTAKE/OUTPUT:      Intake/Output Summary (Last 24 hours) at 10/12/2022 1331  Last data filed at 10/12/2022 1241  Gross per 24 hour   Intake 1516.22 ml   Output 1500 ml   Net 16.22 ml     -----------------------------------------------------------------  Exam:    GEN:    Awake, but drowsy and does not answer questions  EYES:   EOMI, pupils equal   NECK: Supple. No lymphadenopathy. No carotid bruit  CVS:     regular rate and rhythm, no audible murmur  PULM:  CTA, no wheezes, rales or rhonchi, no acute respiratory distress  ABD:     Bowels sounds normal.  Abdomen is soft. No distention. no tenderness to palpation. EXT:     no edema bilaterally . No calf tenderness. NEURO: Moves all extremities. Motor and sensory are grossly intact. Visible tremors   SKIN:    No rashes.   No skin lesions    -----------------------------------------------------------------    Diagnostic Data:      Complete Blood Count:   Recent Labs     10/11/22  0635 10/11/22  0935 10/12/22  0600   WBC 12.9* 13.2* 12.5*   RBC 5.63* 5.42* 4.85   HGB 16.9* 15.9* 14.6   HCT 53.1* 50.7* 46.2   MCV 94.3 93.5 95.3   MCH 30.0 29.3 30.1   MCHC 31.8 31.4 31.6   RDW 13.4 13.6 13.5   * 112* See Reflexed IPF Result   MPV 10.3 10.3  --         Last 3 Blood Glucose:   Recent Labs 10/11/22  0635 10/11/22  0935 10/11/22  1310 10/11/22  1508 10/12/22  0600   GLUCOSE 200* 348* 397* 262* 187*        Comprehensive Metabolic Profile:   Recent Labs     10/11/22  0635 10/11/22  0935 10/11/22  1310 10/11/22  1508 10/12/22  0600   * 157* 158* 160* 167*   K 3.7 3.7 3.5* 3.4* 4.3   * 114* 118* 121* 130*   CO2 28 25 26 26 24   BUN 68* 72* 67* 64* 47*   CREATININE 2.62* 2.50* 2.17* 1.97* 1.28*   GLUCOSE 200* 348* 397* 262* 187*   CALCIUM 9.9 10.0 9.5 9.6 8.5*   PROT 8.2 7.8  --   --  6.7   LABALBU 4.5 4.3  --   --  3.5   BILITOT 1.7* 1.6*  --   --  1.0   ALKPHOS 62 60  --   --  58   AST 68* 59*  --   --  53*   ALT 56* 53*  --   --  56*        Urinalysis:   Lab Results   Component Value Date/Time    NITRU NEGATIVE 10/11/2022 07:00 PM    COLORU Yellow 10/11/2022 07:00 PM    PHUR 6.0 10/11/2022 07:00 PM    WBCUA 2 TO 5 10/11/2022 07:00 PM    RBCUA 2 TO 5 10/11/2022 07:00 PM    MUCUS TRACE 10/11/2022 07:00 PM    BACTERIA 1+ 10/11/2022 07:00 PM    SPECGRAV 1.020 10/11/2022 07:00 PM    LEUKOCYTESUR NEGATIVE 10/11/2022 07:00 PM    UROBILINOGEN ELEVATED 10/11/2022 07:00 PM    BILIRUBINUR NEGATIVE 10/11/2022 07:00 PM    GLUCOSEU 3+ 10/11/2022 07:00 PM    KETUA NEGATIVE 10/11/2022 07:00 PM       HgBA1c:    Lab Results   Component Value Date/Time    LABA1C 6.8 10/11/2022 09:35 AM       Lactic Acid: No results found for: LACTA     Troponin: No results for input(s): TROPONINI in the last 72 hours. CRP:  No results for input(s): CRP in the last 72 hours. Radiology/Imaging:  XR CHEST PORTABLE   Final Result   No acute cardiopulmonary process         CT Head W/O Contrast   Final Result   No acute intracranial abnormality. Prominent senescent changes. These appear more prominent than usually seen   in a patient of this age, but could be due to old injuries, various   medications, or history of substance abuse.                ASSESSMENT / PLAN:  Hypernatremia  Start D5  Stop normal saline  Trend labs  Dehydration  Continue IV fluids  Trend labs  Possibly due to oversedation, decreased oral intake, psychotropic medications including other medications such as lactulose and Jardiance  Type 2 diabetes  POCT before meals and at bedtime  Insulin sliding scale  Hypoglycemia protocol  Hold Jardiance  Essential hypertension  Continue Lopressor  Nutrition status:   at risk for malnutrition  Dietician consult initiated  Hospital Prophylaxis:   DVT: Lovenox   Stress Ulcer: H2 Blocker   High risk medications: none   Disposition:    Discharge plan is pending      ROB Pelletier - CNP , ROB, NP-C  Hospitalist Medicine        10/12/2022, 1:31 PM

## 2022-10-12 NOTE — PROGRESS NOTES
Atilio Horta CNP notified at this time of positive blood culture, gram + cocci in clusters. Will continue to monitor.

## 2022-10-12 NOTE — PROGRESS NOTES
Comprehensive Nutrition Assessment    Type and Reason for Visit:  Initial    Nutrition Recommendations/Plan:   Add Two Bruno (is nectar thick)     Malnutrition Assessment:  Malnutrition Status: At risk for malnutrition (Comment) (10/12/22 6267)    Context:  Acute Illness     Findings of the 6 clinical characteristics of malnutrition:  Energy Intake:  Mild decrease in energy intake (Comment) (at least acutely)  Weight Loss:  Unable to assess     Body Fat Loss:  No significant body fat loss     Muscle Mass Loss:  No significant muscle mass loss    Fluid Accumulation:  No significant fluid accumulation     Strength:  Not Performed    Nutrition Assessment:    Predicted suboptimal nutrient intakes r/t altered cognitive state, AEB dehydration. Worsened serum sodium and chloride, but improving renal indices and glucose. Pureed diet and nectar thicks may limit acceptance as patient improves in cognition. History of 180# in 2021, unknown intention of losses to current weights. Will add a nectar thick supplement to aid with nutrition delivery. Was on a NCS diet at [address] [place] [address] with nectar thicks. IVF changes from NS to D5 noted. Nutrition Related Findings:    fair muscle tone, no edema. Wound Type: None       Current Nutrition Intake & Therapies:    Average Meal Intake: Unable to assess (no PO records)  Average Supplements Intake: None Ordered  ADULT DIET; Dysphagia - Pureed; 4 carb choices (60 gm/meal); Mildly Thick (Nectar)  ADULT ORAL NUTRITION SUPPLEMENT; Breakfast, Lunch, Dinner; Other Oral Supplement; Two Bruno HN    Anthropometric Measures:  Height: 5' 6\" (167.6 cm)  Ideal Body Weight (IBW): 130 lbs (59 kg)    Admission Body Weight: 151 lb 6.4 oz (68.7 kg)  Current Body Weight: 151 lb 6.4 oz (68.7 kg), 116.5 % IBW.  Weight Source: Bed Scale  Current BMI (kg/m2): 24.4  Usual Body Weight: 180 lb (81.6 kg) (in 2021)  % Weight Change (Calculated): -15.9  Weight Adjustment For: No Adjustment                 BMI Categories: Normal Weight (BMI 18.5-24. 9)    Estimated Daily Nutrient Needs:  Energy Requirements Based On: Kcal/kg  Weight Used for Energy Requirements: Current  Energy (kcal/day): 0893-6847 (20-25)  Weight Used for Protein Requirements: Current  Protein (g/day): 75-89 (1.1-1.3)  Method Used for Fluid Requirements: 1 ml/kcal  Fluid (ml/day): 1700+    Nutrition Diagnosis:   Predicted inadequate energy intake related to cognitive or neurological impairment as evidenced by other (comment) (dehydration)    Lab Results   Component Value Date     (HH) 10/12/2022    K 4.3 10/12/2022     (HH) 10/12/2022    CO2 24 10/12/2022    BUN 47 (H) 10/12/2022    CREATININE 1.28 (H) 10/12/2022    GLUCOSE 187 (H) 10/12/2022    CALCIUM 8.5 (L) 10/12/2022    PROT 6.7 10/12/2022    LABALBU 3.5 10/12/2022    BILITOT 1.0 10/12/2022    ALKPHOS 58 10/12/2022    AST 53 (H) 10/12/2022    ALT 56 (H) 10/12/2022    LABGLOM 46 (L) 10/12/2022     No results found for: LABA1C  No results found for: VITD25    Nutrition Interventions:   Food and/or Nutrient Delivery: Continue Current Diet, Start Oral Nutrition Supplement  Nutrition Education/Counseling: Education not appropriate  Coordination of Nutrition Care: Continue to monitor while inpatient  Plan of Care discussed with: nursing    Goals:     Goals: Meet at least 75% of estimated needs       Nutrition Monitoring and Evaluation:   Behavioral-Environmental Outcomes:  Other (Comment) (cognition)  Food/Nutrient Intake Outcomes: Supplement Intake, Food and Nutrient Intake  Physical Signs/Symptoms Outcomes: Chewing or Swallowing, Biochemical Data, Weight    Discharge Planning:    No discharge needs at this time     Felix Grullon, [address], [address] [place] [address]: [phone]

## 2022-10-12 NOTE — PROGRESS NOTES
Patient admitted through the emergency room from [address] [place] [address]. She is a 74year old single, white female, admitted to this hospitalization with a diagnosis of Dehydration. Patient noted to have a history of Major Depressive Disorder, Alcohol Dependence and Alcoholic Cirrhosis of the Liver. She resides at [facility] and [facility] in [facility] but will return to [address] [place] [address] when she leaves hospital, per Yury Cardona, at [address] [place] [address]. Family, sister/legal guardian, Mook Foster, agreeable with this plan. Patient reportedly has been hallucinating and has become increasingly verbally and physically aggressive towards others in her previous placement. LSW to monitor and assist with discharge placement back to [facility] as appropriate.     [address]  10/12/2022

## 2022-10-12 NOTE — PROGRESS NOTES
Pt reassessed and vitals complete as charted. Pt appears restless and continues to pull on meeks. Writer reminded pt to not pull on any of the tubes or cords. Pt remains nonverbal. Attempted ortho's at this time, able to do supine and sitting however was unable to obtain standing ortho's due to patient condition. Bed alarm on, bed in lowest position. Call light within reach.

## 2022-10-12 NOTE — PROGRESS NOTES
Pt shift assessment and vitals completed as charted. Pt repositioned in bed. Pt nonverbal. Blood noted on pt's teeth and lips, lips appear dry, cracked, and bloody. Writer collected pt labs ordered at this time and started ordered IV fluids. Pt shows no signs of pain. Bed alarm on, bed in lowest position. Call light within reach. Will continue to monitor.

## 2022-10-12 NOTE — PLAN OF CARE
Problem: Discharge Planning  Goal: Discharge to home or other facility with appropriate resources  Outcome: Progressing  Flowsheets (Taken 10/12/2022 0344)  Discharge to home or other facility with appropriate resources:   Identify barriers to discharge with patient and caregiver   Arrange for needed discharge resources and transportation as appropriate   Identify discharge learning needs (meds, wound care, etc)     Problem: Skin/Tissue Integrity  Goal: Absence of new skin breakdown  Description: 1. Monitor for areas of redness and/or skin breakdown  2. Assess vascular access sites hourly  3. Every 4-6 hours minimum:  Change oxygen saturation probe site  4. Every 4-6 hours:  If on nasal continuous positive airway pressure, respiratory therapy assess nares and determine need for appliance change or resting period. Outcome: Progressing  Note: Desmond scale monitoring per protocol. Inspect skin for breakdown frequently. Encourage pt to make frequent large adjustments in position or assist patient with turning. Document all areas of breakdown. Problem: Safety - Adult  Goal: Free from fall injury  Outcome: Progressing  Flowsheets (Taken 10/12/2022 0344)  Free From Fall Injury: Instruct family/caregiver on patient safety  Note: Call light in reach at all times, frequent checks, bed in lowest position, wheels of bed and chair locked, non skid footwear on, appropriate transfer techniques, personal items within reach, walkways free of obstructions, fall risk armband and sign displayed, Hdez fall risk score per protocol. No falls this shift, will continue to monitor.

## 2022-10-12 NOTE — PROGRESS NOTES
Occupational Therapy  Facility/Department: [facility] AT THE [facility] MED SURG  Occupational Therapy Initial Assessment    Name: Mitzy Orozco  : 1955  MRN: [id number]  Date of Service: 10/12/2022    Discharge Recommendations:  Continue to assess pending progress, Subacute/Skilled Nursing Facility [facility]          Patient Diagnosis(es): The primary encounter diagnosis was Hypernatremia. Diagnoses of Dehydration and Altered mental status, unspecified altered mental status type were also pertinent to this visit. Past Medical History:  has a past medical history of Depression, Diabetes mellitus ([facility] [address] GERD (gastroesophageal reflux disease), Hepatitis C, Hyperlipidemia, Hypertension, Irregular heartbeat, Murmur, cardiac, Urinary bladder incontinence, Wears eyeglasses, Well adult health check, Well adult health check, and Well adult health check. Past Surgical History:  has a past surgical history that includes hip surgery (Right, ); Tubal ligation (); Breast surgery (Right, ); Tonsillectomy; Colonoscopy; vitrectomy (Left, 2021); and vitrectomy (Left, 2021). Treatment Diagnosis: Generalized weakness      Assessment   Performance deficits / Impairments: Decreased functional mobility ; Decreased endurance;Decreased coordination;Decreased ADL status; Decreased balance;Decreased strength;Decreased safe awareness;Decreased cognition  Assessment: Pt is a 74year old female admitted for dehydration, from [address] [place] [address]. Pt with decreased cognition and ability to open eyes and participate functionally throughout evaluation. Baseline unable to be provided/gathered. MaxA -maxAx2 for transfers. Pt able to inconsistently follow 1 step commands throughout evaluation. Pt would benefit from skilled occupational therapy services to address deficits and maintain joint ROM/participation.   Treatment Diagnosis: Generalized weakness  Prognosis: Fair  Decision Making: Medium Complexity  REQUIRES OT FOLLOW-UP: Yes        Plan Occupational Therapy Plan  Times Per Week: 7x/week  Times Per Day: Once a day  Current Treatment Recommendations: Strengthening, Endurance training, Balance training, Patient/Caregiver education & training, Safety education & training, Self-Care / ADL     Restrictions  Restrictions/Precautions  Restrictions/Precautions: General Precautions, Fall Risk  Required Braces or Orthoses?: No    Subjective   General  Chart Reviewed: Yes  Patient assessed for rehabilitation services?: Yes  Referring Practitioner: Dr. Yunier Roman  Diagnosis: Dehydration  General Comment  Comments: Pt sleeping in chair on therapist arrival, increased prompting and cueing needed to arouse. Social/Functional History  Social/Functional History  Type of Home: Facility ([facility]  ADL Assistance: Needs assistance  Homemaking Assistance: Needs assistance  Ambulation Assistance: Needs assistance  Transfer Assistance: Needs assistance  Additional Comments: Pt comes from [address] [place] [address], limited information due to cognition and limited information in chart review. Based on patient performance during evaluation, pt does require assistance with ADL and IADL tasks. Objective        Observation/Palpation  Posture: Poor  Safety Devices  Type of Devices: Call light within reach; Chair alarm in place; Left in chair;Nurse notified  Restraints  Restraints Initially in Place: No    Transfer Training  Transfer Training: Yes  Overall Level of Assistance: Maximum assistance;Assist X2 (Therapist attempted to have patient complete sit to stand transfer out of chair, with increased difficulty exhibited from patient. Unable to stand at this time due to increased fatigue and decreased alertness.  Potential maxAx2 to complete transfer.)  Interventions: Demonstration  Sit to Stand: Maximum assistance;Assist X2  Stand to Sit: Maximum assistance         ADL  Feeding: Maximum assistance  Grooming: Maximum assistance;Dependent/Total  UE Bathing: Maximum assistance  LE Bathing: Maximum assistance;Dependent/Total  UE Dressing: Maximum assistance  LE Dressing: Maximum assistance;Dependent/Total  Toileting: Dependent/Total     Activity Tolerance  Activity Tolerance: Patient limited by fatigue;Treatment limited secondary to decreased cognition  Bed mobility  Bed Mobility Comments: Patient in chair for evaluation, bed mobility not assessed. Transfers  Sit to stand: Maximum assistance;2 Person assistance  Stand to sit: Maximum assistance;2 Person assistance  Vision  Vision: Within Functional Limits  Hearing  Hearing: Within functional limits  Cognition  Overall Cognitive Status: Exceptions  Following Commands: Inconsistently follows commands  Attention Span: Unable to maintain attention  Orientation  Overall Orientation Status: Impaired  Orientation Level: Unable to assess       Education Given To: Patient  Education Provided: Role of Therapy;Plan of Care  Education Method: Verbal  Barriers to Learning: Cognition    LUE AROM : Exceptions  LUE General AROM: <50% AROM at shoulder level    RUE AROM : Exceptions  RUE General AROM: <50% AROM at shoulder level       AM-PAC Score    AM-[facility] Inpatient Daily Activity Raw Score: 11 (10/12/22 1205)  AM-PAC Inpatient ADL T-Scale Score : 29.04 (10/12/22 1205)  ADL Inpatient CMS 0-100% Score: 70.42 (10/12/22 1205)  ADL Inpatient CMS G-Code Modifier : CL (10/12/22 1205)      Goals  Short Term Goals  Time Frame for Short Term Goals: 10 visits  Short Term Goal 1: Pt will complete AROM/AAROM to BUE as tolerated to maintain joint range of motion for improved participation in ADL and functional transfers. Short Term Goal 2: Pt will complete functional transfers during ADL tasks modA to improve safety and participation in ADL tasks. Short Term Goal 3: Pt will participate in bathing tasks while seated in chair or upright in bed with moderate verbal prompting and modA.        Therapy Time   Individual Concurrent Group Co-treatment   Time In 1048         Time Out [address] Tony Sullivan

## 2022-10-12 NOTE — PLAN OF CARE
Problem: Discharge Planning  Goal: Discharge to home or other facility with appropriate resources  10/12/2022 1204 by Alesha Robles RN  Outcome: Progressing   Discharge plan is back to [facility] after medically cleared. . LSW providing assistance for discharge needs. Will continue to monitor. Problem: Skin/Tissue Integrity  Goal: Absence of new skin breakdown  Description: 1. Monitor for areas of redness and/or skin breakdown  2. Assess vascular access sites hourly  10/12/2022 1204 by Alesha Robles RN  Outcome: Progressing  Patient turn/repositioned every 2 hours. Patient encouraged to ambulate and use restroom every 2 hours. Patient assessed for wet bottoms as needed. Patient assessed for skin breakdown. Heels floated while in chair and bed. Problem: Safety - Adult  Goal: Free from fall injury  10/12/2022 1204 by Alesha Robles RN  Outcome: Progressing  Patient's bed in lowest position. Bed/chair wheel locked. Call light within reach. Non-skid socks worn. Bedside table within reach. Bedrails up x 3. Patient is up with two people assistance. Will continue to monitor. Problem: Nutrition Deficit:  Goal: Optimize nutritional status  10/12/2022 1204 by Alesha Robles RN  Outcome: Progressing  Diet provided per selective menu as ordered. Appetite is fair. Patient unable to feed self, PO fluids encouraged and IV fluids tolerated well, see flowsheet for details. Will continue to monitor. Problem: Pain  Goal: Verbalizes/displays adequate comfort level or baseline comfort level  Outcome: Progressing  Patient encouraged to let staff know when pain is present and to request pain medication when needing it. Non-pharmaceutical measures encouraged, rest and repositioning. Problem: Metabolic/Fluid and Electrolytes - Adult  Goal: Electrolytes maintained within normal limits  Outcome: Progressing  Patient's lab values checked in the AM to see change in Na+ and Chloride level.       Problem: Metabolic/Fluid and Electrolytes - Adult  Goal: Glucose maintained within prescribed range  Outcome: Progressing  Patient blood sugar assessed 1 hour before meals. Sliding scale insulin administered as needed. Other antidiabetic medications administer per orders.

## 2022-10-12 NOTE — PROGRESS NOTES
Claudell Hood CNP notified at this time about critical Na+ and Cl- results. Will continue to monitor patient.

## 2022-10-12 NOTE — PROGRESS NOTES
Physical Therapy  Facility/Department: [facility] AT THE [facility] MED SURG  Physical Therapy Initial Assessment    Name: Castillo Gray  : 1955  MRN: [id number]  Date of Service: 10/12/2022    Discharge Recommendations:  [address] [place]   PT Equipment Recommendations  Equipment Needed: No      Patient Diagnosis(es): The primary encounter diagnosis was Hypernatremia. Diagnoses of Dehydration and Altered mental status, unspecified altered mental status type were also pertinent to this visit. Past Medical History:  has a past medical history of Depression, Diabetes mellitus ([facility] [address] GERD (gastroesophageal reflux disease), Hepatitis C, Hyperlipidemia, Hypertension, Irregular heartbeat, Murmur, cardiac, Urinary bladder incontinence, Wears eyeglasses, Well adult health check, Well adult health check, and Well adult health check. Past Surgical History:  has a past surgical history that includes hip surgery (Right, ); Tubal ligation (); Breast surgery (Right, ); Tonsillectomy; Colonoscopy; vitrectomy (Left, 2021); and vitrectomy (Left, 2021). Assessment   Body Structures, Functions, Activity Limitations Requiring Skilled Therapeutic Intervention: Decreased functional mobility ; Decreased ADL status; Decreased strength;Decreased safe awareness;Decreased cognition;Decreased endurance;Decreased balance;Decreased high-level IADLs  Assessment: DX DEHYDRATION. IMPAIRED COGNITION. MAX ASSIST TRANSFERS,MAX ASSIST BED MOBILITY. POOR SITTING AND POOR STANDING BALANCE.   Treatment Diagnosis: GENERALIZED WEAKNESS  Therapy Prognosis: Fair  Decision Making: Medium Complexity  Requires PT Follow-Up: Yes  Activity Tolerance  Activity Tolerance: Patient limited by fatigue;Treatment limited secondary to decreased cognition     Plan   Physcial Therapy Plan  General Plan: 2 times a day 7 days a week  Current Treatment Recommendations: Strengthening, ROM, Balance training, Functional mobility training, Transfer training, ADL/Self-care training, Neuromuscular re-education, Gait training, Safety education & training, Patient/Caregiver education & training  Safety Devices  Type of Devices: Call light within reach, Chair alarm in place, Left in chair, Nurse notified  Restraints  Restraints Initially in Place: No     Restrictions  Restrictions/Precautions  Restrictions/Precautions: General Precautions (BED ALARM,CHAIR  ALARM.)  Required Braces or Orthoses?: No     Subjective   General  Chart Reviewed: Yes  Patient assessed for rehabilitation services?: Yes  Additional Pertinent Hx: DEHYDRATION,[facility] PATIENT  Diagnosis: DEHYDRATION  Follows Commands: Impaired  Other (Comment): LETHARGIC,SLOW TO RESPOND. Subjective  Subjective: NO SIGNS OF DISCOMFORT PAIN 0/10. Social/Functional History  Social/Functional History  Type of Home: Facility ([facility])  Vision/Hearing  Vision  Vision: Within Functional Limits  Hearing  Hearing: Within functional limits    Cognition   Orientation  Overall Orientation Status: Impaired  Orientation Level: Oriented to person;Disoriented to situation;Disoriented to time;Disoriented to place  Cognition  Overall Cognitive Status: Exceptions  Following Commands: Inconsistently follows commands  Attention Span: Unable to maintain attention     Objective   Heart Rate: 87  Heart Rate Source: Monitor; Apical  BP: (!) 147/76  BP Location: Left upper arm  BP Method: Automatic  Patient Position: Semi fowlers  MAP (Calculated): 99.67  Resp: 18  SpO2: 97 %  O2 Device: None (Room air)     Observation/Palpation  Posture: Poor        PROM RLE (degrees)  RLE PROM: WFL  PROM LLE (degrees)  LLE PROM: WFL  PROM RUE (degrees)  RUE PROM: WFL  PROM LUE (degrees)  LUE PROM: WFL  Strength RLE  Strength RLE: Exception  Comment: 3/5  Strength LLE  Strength LLE: Exception  Comment: 3/5  Strength RUE  Strength RUE: Exception  Comment: 3/5  Strength LUE  Strength LUE: Exception  Comment: 3/5        Bed Mobility Training  Bed Mobility Training: Yes  Overall Level of Assistance: Maximum assistance  Interventions: Demonstration  Rolling: Maximum assistance  Supine to Sit: Maximum assistance  Sit to Supine: Maximum assistance  Scooting: Maximum assistance  Balance  Sitting: Impaired  Sitting - Static: Poor (constant support)  Sitting - Dynamic: Poor (constant support)  Standing: Impaired  Standing - Static: Poor  Standing - Dynamic: Poor  Transfer Training  Transfer Training: Yes  Overall Level of Assistance: Maximum assistance  Interventions: Demonstration  Sit to Stand: Maximum assistance  Stand to Sit: Maximum assistance  Stand Pivot Transfers: Maximum assistance  Bed to Chair: Maximum assistance  Gait Training  Gait Training: No (UNABLE TO AMBULATE AT THIS TIME MAX ASSIST TO STAND.)  Wheelchair Management  Wheelchair Management: No                 Exercise Treatment: THER EX SITTING,DIFFICULT FOR PATIENT TO PARTICIPATE DUE TO LETHARGY. OutComes Score                                                  AM-PAC Score             T[place] Score       Goals  Short Term Goals  Time Frame for Short Term Goals: 3 DAYS  Short Term Goal 1: MOD ASSIST BED MOBILITY  Short Term Goal 2: MIN ASSIST TRANSFERS. Long Term Goals  Time Frame for Long Term Goals : 4 WEEKS  Long Term Goal 1: MIN ASSIST TRANSFERS AND MIN ASSIST BED MOBILITY. Patient Goals   Patient Goals : UNABLE TO ASCERTAIN DUE TO IMPAIRED COGNITION AND LETHARGY.        Education  Patient Education  Education Given To: Patient  Education Provided: Role of Therapy;Transfer Training (BED MOBILITY AND EX.)      Therapy Time   Individual Concurrent Group Co-treatment   Time In 0730         Time Out 0800         Minutes 30         Timed Code Treatment Minutes: Mary Henley, PT

## 2022-10-12 NOTE — PROGRESS NOTES
Claudell Hood CNP notified at this time of positive blood culture results. Will continue to monitor patient.

## 2022-10-12 NOTE — PROGRESS NOTES
Entered patient's room for afternoon vital signs and head to toe reassessment. Patient resting the chair at this time. Orientation level unable to assess at this time due to patient not following verbal commands and patient being lethargic. Patient does not look to be in pain based on the adult non verbal pain scale. No change in assessment from this morning except patient is not following commands as easily as this morning. Tremors are less present this afternoon and patient seems to be more relaxed and less anxious/restless. Call light within reach. Chair alarm on. Bed/chair wheels locked. Bed in lowest position. Family at bedside Will continue to monitor patient.

## 2022-10-13 PROBLEM — B95.61 BACTEREMIA DUE TO STAPHYLOCOCCUS AUREUS: Status: ACTIVE | Noted: 2022-10-13

## 2022-10-13 PROBLEM — R78.81 BACTEREMIA DUE TO STAPHYLOCOCCUS AUREUS: Status: ACTIVE | Noted: 2022-10-13

## 2022-10-13 LAB
ABSOLUTE EOS #: 0 K/UL (ref 0–0.44)
ABSOLUTE IMMATURE GRANULOCYTE: 0.14 K/UL (ref 0–0.3)
ABSOLUTE LYMPH #: 2.57 K/UL (ref 1.1–3.7)
ABSOLUTE MONO #: 1.49 K/UL (ref 0.1–1.2)
ALBUMIN SERPL-MCNC: 3.5 G/DL (ref 3.5–5.2)
ALBUMIN/GLOBULIN RATIO: 1.1 (ref 1–2.5)
ALP BLD-CCNC: 71 U/L (ref 35–104)
ALT SERPL-CCNC: 39 U/L (ref 5–33)
ANION GAP SERPL CALCULATED.3IONS-SCNC: 10 MMOL/L (ref 9–17)
ANION GAP SERPL CALCULATED.3IONS-SCNC: 7 MMOL/L (ref 9–17)
AST SERPL-CCNC: 21 U/L
BASOPHILS # BLD: 0 % (ref 0–2)
BASOPHILS ABSOLUTE: 0 K/UL (ref 0–0.2)
BILIRUB SERPL-MCNC: 0.6 MG/DL (ref 0.3–1.2)
BUN BLDV-MCNC: 29 MG/DL (ref 8–23)
BUN BLDV-MCNC: 33 MG/DL (ref 8–23)
BUN/CREAT BLD: 28 (ref 9–20)
BUN/CREAT BLD: 28 (ref 9–20)
CALCIUM SERPL-MCNC: 8.8 MG/DL (ref 8.6–10.4)
CALCIUM SERPL-MCNC: 9 MG/DL (ref 8.6–10.4)
CHLORIDE BLD-SCNC: 125 MMOL/L (ref 98–107)
CHLORIDE BLD-SCNC: 126 MMOL/L (ref 98–107)
CO2: 26 MMOL/L (ref 20–31)
CO2: 26 MMOL/L (ref 20–31)
CREAT SERPL-MCNC: 1.03 MG/DL (ref 0.5–0.9)
CREAT SERPL-MCNC: 1.16 MG/DL (ref 0.5–0.9)
EOSINOPHILS RELATIVE PERCENT: 0 % (ref 1–4)
GFR SERPL CREATININE-BSD FRML MDRD: 52 ML/MIN/1.73M2
GFR SERPL CREATININE-BSD FRML MDRD: >60 ML/MIN/1.73M2
GLUCOSE BLD-MCNC: 172 MG/DL (ref 74–100)
GLUCOSE BLD-MCNC: 212 MG/DL (ref 74–100)
GLUCOSE BLD-MCNC: 252 MG/DL (ref 70–99)
GLUCOSE BLD-MCNC: 253 MG/DL (ref 74–100)
GLUCOSE BLD-MCNC: 274 MG/DL (ref 74–100)
GLUCOSE BLD-MCNC: 281 MG/DL (ref 70–99)
HCT VFR BLD CALC: 46.1 % (ref 36.3–47.1)
HEMOGLOBIN: 14.2 G/DL (ref 11.9–15.1)
IMMATURE GRANULOCYTES: 1 %
LYMPHOCYTES # BLD: 19 % (ref 24–43)
MCH RBC QN AUTO: 29.6 PG (ref 25.2–33.5)
MCHC RBC AUTO-ENTMCNC: 30.8 G/DL (ref 28.4–34.8)
MCV RBC AUTO: 96.2 FL (ref 82.6–102.9)
MONOCYTES # BLD: 11 % (ref 3–12)
MORPHOLOGY: ABNORMAL
NRBC AUTOMATED: 0 PER 100 WBC
PDW BLD-RTO: 13.9 % (ref 11.8–14.4)
PLATELET # BLD: ABNORMAL K/UL (ref 138–453)
PLATELET, FLUORESCENCE: 76 K/UL (ref 138–453)
PLATELET, IMMATURE FRACTION: 2.6 % (ref 1.1–10.3)
POTASSIUM SERPL-SCNC: 4 MMOL/L (ref 3.7–5.3)
POTASSIUM SERPL-SCNC: 4 MMOL/L (ref 3.7–5.3)
RBC # BLD: 4.79 M/UL (ref 3.95–5.11)
SEG NEUTROPHILS: 69 % (ref 36–65)
SEGMENTED NEUTROPHILS ABSOLUTE COUNT: 9.3 K/UL (ref 1.5–8.1)
SODIUM BLD-SCNC: 147 MMOL/L (ref 135–144)
SODIUM BLD-SCNC: 159 MMOL/L (ref 135–144)
SODIUM BLD-SCNC: 161 MMOL/L (ref 135–144)
TOTAL PROTEIN: 6.6 G/DL (ref 6.4–8.3)
WBC # BLD: 13.5 K/UL (ref 3.5–11.3)

## 2022-10-13 PROCEDURE — 6370000000 HC RX 637 (ALT 250 FOR IP): Performed by: FAMILY MEDICINE

## 2022-10-13 PROCEDURE — 99223 1ST HOSP IP/OBS HIGH 75: CPT | Performed by: INTERNAL MEDICINE

## 2022-10-13 PROCEDURE — 82947 ASSAY GLUCOSE BLOOD QUANT: CPT

## 2022-10-13 PROCEDURE — 94761 N-INVAS EAR/PLS OXIMETRY MLT: CPT

## 2022-10-13 PROCEDURE — 2580000003 HC RX 258: Performed by: INTERNAL MEDICINE

## 2022-10-13 PROCEDURE — 36415 COLL VENOUS BLD VENIPUNCTURE: CPT

## 2022-10-13 PROCEDURE — 6370000000 HC RX 637 (ALT 250 FOR IP): Performed by: NURSE PRACTITIONER

## 2022-10-13 PROCEDURE — 1200000000 HC SEMI PRIVATE

## 2022-10-13 PROCEDURE — 80053 COMPREHEN METABOLIC PANEL: CPT

## 2022-10-13 PROCEDURE — 80048 BASIC METABOLIC PNL TOTAL CA: CPT

## 2022-10-13 PROCEDURE — 2580000003 HC RX 258: Performed by: NURSE PRACTITIONER

## 2022-10-13 PROCEDURE — 85025 COMPLETE CBC W/AUTO DIFF WBC: CPT

## 2022-10-13 PROCEDURE — 85055 RETICULATED PLATELET ASSAY: CPT

## 2022-10-13 PROCEDURE — 84295 ASSAY OF SERUM SODIUM: CPT

## 2022-10-13 PROCEDURE — 6360000002 HC RX W HCPCS: Performed by: NURSE PRACTITIONER

## 2022-10-13 RX ORDER — LEVOFLOXACIN 5 MG/ML
750 INJECTION, SOLUTION INTRAVENOUS
Status: DISCONTINUED | OUTPATIENT
Start: 2022-10-13 | End: 2022-10-13

## 2022-10-13 RX ORDER — ZIPRASIDONE MESYLATE 20 MG/ML
20 INJECTION, POWDER, LYOPHILIZED, FOR SOLUTION INTRAMUSCULAR EVERY 12 HOURS PRN
Status: DISCONTINUED | OUTPATIENT
Start: 2022-10-13 | End: 2022-10-14

## 2022-10-13 RX ADMIN — BENZTROPINE MESYLATE 1 MG: 1 TABLET ORAL at 20:50

## 2022-10-13 RX ADMIN — METOPROLOL TARTRATE 50 MG: 50 TABLET, FILM COATED ORAL at 09:14

## 2022-10-13 RX ADMIN — ASPIRIN 81 MG: 81 TABLET, COATED ORAL at 09:14

## 2022-10-13 RX ADMIN — CEFAZOLIN 2000 MG: 2 INJECTION, POWDER, FOR SOLUTION INTRAMUSCULAR; INTRAVENOUS at 12:10

## 2022-10-13 RX ADMIN — LORAZEPAM 1 MG: 1 TABLET ORAL at 05:54

## 2022-10-13 RX ADMIN — PANTOPRAZOLE SODIUM 40 MG: 40 TABLET, DELAYED RELEASE ORAL at 09:14

## 2022-10-13 RX ADMIN — BENZTROPINE MESYLATE 1 MG: 1 TABLET ORAL at 09:14

## 2022-10-13 RX ADMIN — RISPERIDONE 2 MG: 1 TABLET, FILM COATED ORAL at 20:49

## 2022-10-13 RX ADMIN — CEFAZOLIN 2000 MG: 2 INJECTION, POWDER, FOR SOLUTION INTRAMUSCULAR; INTRAVENOUS at 20:46

## 2022-10-13 RX ADMIN — INSULIN LISPRO 4 UNITS: 100 INJECTION, SOLUTION INTRAVENOUS; SUBCUTANEOUS at 12:00

## 2022-10-13 RX ADMIN — DIVALPROEX SODIUM 125 MG: 125 CAPSULE ORAL at 20:50

## 2022-10-13 RX ADMIN — DIVALPROEX SODIUM 125 MG: 125 CAPSULE ORAL at 14:34

## 2022-10-13 RX ADMIN — DEXTROSE MONOHYDRATE: 50 INJECTION, SOLUTION INTRAVENOUS at 02:02

## 2022-10-13 RX ADMIN — ZIPRASIDONE MESYLATE 20 MG: 20 INJECTION, POWDER, LYOPHILIZED, FOR SOLUTION INTRAMUSCULAR at 10:06

## 2022-10-13 RX ADMIN — RISPERIDONE 2 MG: 1 TABLET, FILM COATED ORAL at 09:14

## 2022-10-13 RX ADMIN — THIAMINE HCL TAB 100 MG 100 MG: 100 TAB at 09:14

## 2022-10-13 RX ADMIN — PRAVASTATIN SODIUM 40 MG: 20 TABLET ORAL at 09:14

## 2022-10-13 RX ADMIN — INSULIN LISPRO 4 UNITS: 100 INJECTION, SOLUTION INTRAVENOUS; SUBCUTANEOUS at 09:24

## 2022-10-13 RX ADMIN — FOLIC ACID 1 MG: 1 TABLET ORAL at 09:14

## 2022-10-13 RX ADMIN — METOPROLOL TARTRATE 50 MG: 50 TABLET, FILM COATED ORAL at 20:50

## 2022-10-13 RX ADMIN — POTASSIUM CHLORIDE 10 MEQ: 10 TABLET, EXTENDED RELEASE ORAL at 09:14

## 2022-10-13 RX ADMIN — DEXTROSE MONOHYDRATE: 50 INJECTION, SOLUTION INTRAVENOUS at 20:48

## 2022-10-13 RX ADMIN — DIVALPROEX SODIUM 125 MG: 125 CAPSULE ORAL at 09:14

## 2022-10-13 RX ADMIN — DEXTROSE MONOHYDRATE: 50 INJECTION, SOLUTION INTRAVENOUS at 11:59

## 2022-10-13 NOTE — CONSULTS
[facility]          [facility]        [address]        [place], [place] [facility]        [phone]           Inpatient Initial consult note         10/13/2022 1:05 PM      Patient Name:   Yunier Martin  YOB: 1955  Primary Care Physician:  Fabian Savage DO   Admit Date:    10/11/2022  9:22 AM      TELEHEALTH EVALUATION -- Audio/Visual (During VDGEF-13 public health emergency)     Telehealth service was provided with the patient at his room [address] [facility] and myself the physician in my gold officeand my RN Vicky Dan who has initiated the visit. Pursuant to the emergency declaration under the [address] [place] [address] waiver authority and the [employer] Act, this Virtual  Visit was conducted, with patient's consent, to reduce the patient's risk of exposure to COVID-19 and provide continuity of care for an established patient. Services were provided through a video synchronous discussion virtually to substitute for in-person clinic visit. Consultation requested by : uLlu Yanez MD    Reason for Consult : Nephrology following for SHAINA/hypernatremia. History of presenting illness :Yunier Martin is a 74 y.o.   female with Past Medical History as stated below presented with a chief complaint of Altered Mental Status (Na+ 161 per [facility])   on 10/11/2022 . Patient is nonverbal, accurate history and review of systems cannot be obtained. Patient presented with lethargy and dehydration to the ER patient was found to have hypernatremia along with renal dysfunction she was also initially hypotensive her blood pressure has improved. Patient's sodium at presentation on 10/11 at 6 AM was 161 which has gradually worsened and the latest at 1 PM 10/13/2022 is 159.   Her creatinine was also elevated at 2.62 upon arrival.    Past History:  Past Medical History:   Diagnosis Date Outpatient here for infusion, ambulatory to room, nad noted, see vss, oriented to room and call light, #22 started in rac.   Waiting on infusion Alcohol dependence in remission [facility])     Alcoholic cirrhosis of liver without ascites (HCC)     ASHD (arteriosclerotic heart disease)     Bacteremia due to Staphylococcus aureus 10/13/2022    Depression     Diabetes mellitus (HCC)     GERD (gastroesophageal reflux disease)     Hepatitis C     WITH FULL RECOVERY - INFECTIOUS DISEASE DR. Sury Delgadillo - LAST VISIT 2020    Hyperlipidemia     Hypertension     Irregular heartbeat     Murmur, cardiac     noted by PCP, no echo per patient    Urinary bladder incontinence     Wears eyeglasses     Well adult health check     PCP - DR. Chaim Honeycutt - 3/2021    Well adult health check     INFECTIOUS DISEASE - DR. Raheem Hi    Well adult health check     GI - DR. Perry Cadroza     Past Surgical History:   Procedure Laterality Date    BREAST SURGERY Right 1975    REMOVAL BENIGN TUMOR    COLONOSCOPY      HIP SURGERY Right 1964    RECONSTRUCTION    TONSILLECTOMY      AS A CHILD    TUBAL LIGATION  1975    VITRECTOMY Left 2021    VITRECTOMY Left 2021    VITRECTOMY 25 GAUGE, MEMBRANE PEEL, ICG performed by Segun Barrera MD at [address] [place] [address] History     Socioeconomic History    Marital status: Single     Spouse name: Not on file    Number of children: Not on file    Years of education: Not on file    Highest education level: Not on file   Occupational History    Not on file   Tobacco Use    Smoking status: Former     Types: Cigarettes     Quit date: 56     Years since quittin.8    Smokeless tobacco: Never   Vaping Use    Vaping Use: Never used   Substance and Sexual Activity    Alcohol use: Not on file    Drug use: Not on file    Sexual activity: Not on file   Other Topics Concern    Not on file   Social History Narrative    Not on file     Social Determinants of Health     Financial Resource Strain: Not on file   Food Insecurity: Not on file   Transportation Needs: Not on file   Physical Activity: Not on file   Stress: Not on file   Social Connections: Not on file   Intimate Partner Violence: Not on file   Housing Stability: Not on file     History reviewed. No pertinent family history. Medications & Allergies :  Prior to Admission medications    Medication Sig Start Date End Date Taking? Authorizing Provider   ziprasidone (GEODON) 20 MG injection Inject 20 mg into the muscle every 12 hours as needed for Agitation   Yes Historical Provider, MD   haloperidol lactate (HALDOL) 5 MG/ML injection Inject into the muscle every 6 hours as needed for Agitation   Yes Historical Provider, MD   haloperidol (HALDOL) 5 MG tablet Take 5 mg by mouth every 6 hours as needed for Agitation   Yes Historical Provider, MD   ARIPiprazole (ABILIFY) 15 MG tablet Take 15 mg by mouth daily   Yes Historical Provider, MD   benztropine (COGENTIN) 2 MG tablet Take 2 mg by mouth daily Tremors   Yes Historical Provider, MD   buPROPion (WELLBUTRIN XL) 150 MG extended release tablet Take 150 mg by mouth every morning   Yes Historical Provider, MD   Dentifrices (BIOTENE DRY MOUTH DT) Place 2 sprays onto teeth 4 times daily   Yes Historical Provider, MD   aspirin 81 MG EC tablet Take 81 mg by mouth daily   Yes Historical Provider, MD   empagliflozin (JARDIANCE) 25 MG tablet Take 25 mg by mouth daily   Yes Historical Provider, MD   folic acid (FOLVITE) 1 MG tablet Take 1 mg by mouth daily   Yes Historical Provider, MD   potassium chloride (KLOR-CON M) 10 MEQ extended release tablet Take 10 mEq by mouth daily For hypokalemia.    Yes Historical Provider, MD   risperiDONE (RISPERDAL) 2 MG tablet Take 2 mg by mouth 2 times daily   Yes Historical Provider, MD   divalproex (DEPAKOTE SPRINKLE) 125 MG capsule Take 250 mg by mouth in the morning, at noon, and at bedtime   Yes Historical Provider, MD   insulin lispro (HUMALOG) 100 UNIT/ML injection vial Inject into the skin 4 times daily (after meals and at bedtime) Sliding scale:    0-150 =0  151-200= 2 units  201-250= 4 units  251-300= 6 units  301-350= 8 units  351-400= 10 units  401-450=12 units  451-500=14 units  >500:  Call MD   Yes Historical Provider, MD   LORazepam (ATIVAN) 2 MG/ML injection Infuse 1 mg intravenously every 6 hours as needed (anxiety). Yes Historical Provider, MD   LORazepam (ATIVAN) 1 MG tablet Take 1 mg by mouth every 6 hours as needed for Anxiety.    Yes Historical Provider, MD   Dulaglutide 1.5 MG/0.5ML SOPN Inject 1.5 mg into the skin once a week Fridays   Yes Historical Provider, MD   Multiple Vitamin TABS Take 1 tablet by mouth daily   Yes Historical Provider, MD   benztropine (COGENTIN) 1 MG tablet Take 1 mg by mouth 2 times daily   Yes Historical Provider, MD   saliva substitute (BIOTENE/MOUTH KOTE) SOLN liquid Take 2 sprays by mouth 4 times daily   Yes Historical Provider, MD   lactulose encephalopathy 10 GM/15ML SOLN solution Take 20 g by mouth in the morning, at noon, in the evening, and at bedtime   Yes Historical Provider, MD   furosemide (LASIX) 40 MG tablet Take 40 mg by mouth daily    Historical Provider, MD   vitamin B-1 (THIAMINE) 100 MG tablet Take 100 mg by mouth daily    Historical Provider, MD   pravastatin (PRAVACHOL) 40 MG tablet Take 40 mg by mouth daily    Historical Provider, MD   losartan (COZAAR) 25 MG tablet Take 25 mg by mouth daily    Historical Provider, MD   glipiZIDE (GLUCOTROL XL) 5 MG extended release tablet Take 5 mg by mouth daily    Historical Provider, MD   VORTIoxetine HBr (TRINTELLIX) 20 MG TABS tablet Take 20 mg by mouth daily    Historical Provider, MD   omeprazole (PRILOSEC) 20 MG delayed release capsule Take 20 mg by mouth nightly    Historical Provider, MD     Allergies: Lisinopril  IP meds : Scheduled Meds:   ceFAZolin  2,000 mg IntraVENous Q8H    insulin lispro  0-8 Units SubCUTAneous TID WC    insulin lispro  0-4 Units SubCUTAneous Nightly    aspirin  81 mg Oral Daily    benztropine  1 mg Oral BID    divalproex  125 mg Oral TID    folic acid  1 mg Oral Daily    metoprolol tartrate  50 mg Oral BID    pantoprazole  40 mg Oral QAM AC    potassium chloride  10 mEq Oral Daily    pravastatin  40 mg Oral Daily    risperiDONE  2 mg Oral BID    vitamin B-1  100 mg Oral Daily    VORTIoxetine HBr  20 mg Oral Daily    sodium chloride flush  5-40 mL IntraVENous 2 times per day     Continuous Infusions:   dextrose 100 mL/hr at 10/13/22 1159    dextrose      sodium chloride         Review of Systems  Review of Systems   Unable to perform ROS: Mental status change  Physical Exam  Physical Exam  Vitals reviewed. Constitutional:       Comments: Ill-appearing not much arousable cachectic and ill nourished   HENT:      Head: Normocephalic and atraumatic. Right Ear: External ear normal.      Left Ear: External ear normal.      Nose: Nose normal.      Mouth/Throat:      Mouth: Mucous membranes are moist.   Eyes:      General: No scleral icterus. Right eye: No discharge. Left eye: No discharge. Conjunctiva/sclera: Conjunctivae normal.   Neck:      Comments: No accessory muscle use  Cardiovascular:      Rate and Rhythm: Normal rate and regular rhythm. Heart sounds: Normal heart sounds. Pulmonary:      Effort: Pulmonary effort is normal. No respiratory distress. Breath sounds: Normal breath sounds. No wheezing. Comments: Diminished breath sounds per nursing staff no other adventitious sounds  Abdominal:      General: There is no distension. Palpations: Abdomen is soft. Tenderness: There is no abdominal tenderness. Musculoskeletal:         General: No swelling. Right lower leg: No edema. Left lower leg: No edema. Skin:     General: Skin is warm and dry. Findings: No rash.    Neurological:      Comments: Not responsive to verbal commands   Psychiatric:      Comments: She is not agitated        Labs, Radiology and Tests :  CBC:   Recent Labs     10/11/22  0935 10/12/22  0600 10/13/22  0553   WBC 13.2* 12.5* 13.5*   HGB 15.9* 14.6 14.2   HCT 50.7* 46.2 46.1   * See Reflexed IPF Result See Reflexed IPF Result     CMP:  Recent Labs     10/11/22  0935 10/11/22  1310 10/11/22  1508 10/12/22  0600 10/12/22  1335 10/12/22  1336 10/13/22  0553   *   < > 160* 167*  --  163* 161*   K 3.7   < > 3.4* 4.3  --   --  4.0   *   < > 121* 130* 130*  --  125*   CO2 25   < > 26 24  --   --  26   BUN 72*   < > 64* 47*  --   --  33*   CREATININE 2.50*   < > 1.97* 1.28*  --   --  1.16*   GLUCOSE 348*   < > 262* 187*  --   --  281*   CALCIUM 10.0   < > 9.6 8.5*  --   --  9.0   MG 3.1*  --   --   --   --   --   --    PHOS 5.8*  --   --   --   --   --   --     < > = values in this interval not displayed. Hepatic:   Recent Labs     10/11/22  0935 10/12/22  0600 10/13/22  0553   LABALBU 4.3 3.5 3.5   AST 59* 53* 21   ALT 53* 56* 39*   BILITOT 1.6* 1.0 0.6   ALKPHOS 60 62 71       Radiology : Chest x-ray reviewed by me appears reasonably clear    Old lab data have been reviewed and noted patient's baseline creatinine is around 0.94 weeks ago and the sodium was 137    Assessment and Plan:  Renal -acute kidney injury most likely secondary to volume depletion  Currently improving with IV fluids almost back to baseline  Monitor BMP closely  Electrolytes -hyponatremia secondary to intracellular volume depletion initially appears to be receiving some normal saline now on D5 W. Sodium is still maintaining the same as it was on admission will increase to 150 mL/h. Appears to be making decent urine output  Acid-base status appears to be stable  Hx of diabetes mellitus  Major depressive disorder with psychotic features  Essential hypertension stable  Meds reviewed and discussed with nursing staff    Noemy Lala MD  [facility]    This report has been created using voice recognition software.  It may contain minor errors which are inherent in voice recognition technology

## 2022-10-13 NOTE — PROGRESS NOTES
Devices: Call light within reach;Nurse notified; Left in bed;Bed alarm in place   Goals  Short Term Goals  Time Frame for Short Term Goals: 3 DAYS  Short Term Goal 1: MOD ASSIST BED MOBILITY  Short Term Goal 2: MIN ASSIST TRANSFERS. Long Term Goals  Time Frame for Long Term Goals : 4 WEEKS  Long Term Goal 1: MIN ASSIST TRANSFERS AND MIN ASSIST BED MOBILITY. Patient Goals   Patient Goals : UNABLE TO ASCERTAIN DUE TO IMPAIRED COGNITION AND LETHARGY. Education  Patient Education  Education Given To: Patient  Education Provided: Role of Therapy;Transfer Training  Education Method: Verbal;Demonstration  Barriers to Learning: Cognition  Education Outcome: Unable to verbalize; Unable to demonstrate understanding;Continued education needed  Therapy Time   Individual Concurrent Group Co-treatment   Time In 1530         Time Out 1545         Minutes [place], [place]

## 2022-10-13 NOTE — PROGRESS NOTES
[address] [facility]  Inpatient/Observation/Outpatient Rehabilitation    Date: 10/13/2022  Patient Name: Fortunato Rose       [x] Inpatient Acute/Observation       []  Outpatient  : 1955       [] Pt no showed for scheduled appointment    [] Pt refused/declined therapy at this time due to:           [x] Pt cancelled due to:  [] No Reason Given   [] Sick/ill   [x] Other: Pt has been agitated and resting now. Nursing requested to let pt sleep. Therapist/Assistant will attempt to see this patient, at our earliest opportunity.        IRASEMA Keane Date: 10/13/2022

## 2022-10-13 NOTE — PROGRESS NOTES
Patient agitated at this time. Patient kicking and pinching. Patient trying to climb out of the chair independently. Patient redirected at this time. Notified Armando Petty CNP and Geodon order written. Will follow order. Will continue to monitor patient.

## 2022-10-13 NOTE — PROGRESS NOTES
[address] [facility]   Pharmacy Renal Adjustment Note    Rani Fenton is a 74 y.o. female. Pharmacist assessment of renally cleared medications. Recent Labs     10/11/22  1508 10/12/22  0600 10/13/22  0553   CREATININE 1.97* 1.28* 1.16*     Estimated Creatinine Clearance: 45 mL/min (A) (based on SCr of 1.16 mg/dL (H)). Height:   Ht Readings from Last 1 Encounters:   10/12/22 5' 6\" (1.676 m)     Weight:  Wt Readings from Last 1 Encounters:   10/13/22 152 lb 1.6 oz (69 kg)       The following medication(s) have been adjusted based upon renal function:             Levaquin 500 mg daily changed to Levaquin 750 mg every 48 hours.     Thank you,  Mane Singer, PharmD, 10/13/2022 10:08 AM

## 2022-10-13 NOTE — PROGRESS NOTES
Progress Note    SUBJECTIVE:    Patient seen for f/u of Hypernatremia. She alert restless and agitated. Requiring more monitoring for safety    ROS: Unable due to altered mental status     All other systems were reviewed with the patient and are negative unless otherwise stated in HPI      OBJECTIVE:      Vitals:   Vitals:    10/13/22 0709   BP: 137/74   Pulse: 80   Resp: 16   Temp: 97 °F (36.1 °C)   SpO2: 96%     Weight: 152 lb 1.6 oz (69 kg)   Height: 5' 6\" (167.6 cm)     Weight  Wt Readings from Last 3 Encounters:   10/13/22 152 lb 1.6 oz (69 kg)   04/22/21 180 lb (81.6 kg)     Body mass index is 24.55 kg/m². 24HR INTAKE/OUTPUT:      Intake/Output Summary (Last 24 hours) at 10/13/2022 1150  Last data filed at 10/13/2022 0438  Gross per 24 hour   Intake 1716 ml   Output 2200 ml   Net -484 ml     -----------------------------------------------------------------  Exam:    GEN:    Awake, confused and agitated  EYES:   EOMI, pupils equal   NECK: Supple. No lymphadenopathy. No carotid bruit  CVS:     regular rate and rhythm, no audible murmur  PULM:  CTA, no wheezes, rales or rhonchi, no acute respiratory distress  ABD:     Bowels sounds normal.  Abdomen is soft. No distention. no tenderness to palpation. EXT:     no edema bilaterally . No calf tenderness. NEURO: Moves all extremities. Motor and sensory are grossly intact. Visible tremors   SKIN:    No rashes.   No skin lesions    -----------------------------------------------------------------    Diagnostic Data:      Complete Blood Count:   Recent Labs     10/11/22  0635 10/11/22  0935 10/12/22  0600 10/13/22  0553   WBC 12.9* 13.2* 12.5* 13.5*   RBC 5.63* 5.42* 4.85 4.79   HGB 16.9* 15.9* 14.6 14.2   HCT 53.1* 50.7* 46.2 46.1   MCV 94.3 93.5 95.3 96.2   MCH 30.0 29.3 30.1 29.6   MCHC 31.8 31.4 31.6 30.8   RDW 13.4 13.6 13.5 13.9   * 112* See Reflexed IPF Result See Reflexed IPF Result   MPV 10.3 10.3  --   --         Last 3 Blood Glucose: Recent Labs     10/11/22  0635 10/11/22  0935 10/11/22  1310 10/11/22  1508 10/12/22  0600 10/13/22  0553   GLUCOSE 200* 348* 397* 262* 187* 281*        Comprehensive Metabolic Profile:   Recent Labs     10/11/22  0935 10/11/22  1310 10/11/22  1508 10/12/22  0600 10/12/22  1335 10/12/22  1336 10/13/22  0553   *   < > 160* 167*  --  163* 161*   K 3.7   < > 3.4* 4.3  --   --  4.0   *   < > 121* 130* 130*  --  125*   CO2 25   < > 26 24  --   --  26   BUN 72*   < > 64* 47*  --   --  33*   CREATININE 2.50*   < > 1.97* 1.28*  --   --  1.16*   GLUCOSE 348*   < > 262* 187*  --   --  281*   CALCIUM 10.0   < > 9.6 8.5*  --   --  9.0   PROT 7.8  --   --  6.7  --   --  6.6   LABALBU 4.3  --   --  3.5  --   --  3.5   BILITOT 1.6*  --   --  1.0  --   --  0.6   ALKPHOS 60  --   --  58  --   --  71   AST 59*  --   --  53*  --   --  21   ALT 53*  --   --  56*  --   --  39*    < > = values in this interval not displayed. Urinalysis:   Lab Results   Component Value Date/Time    NITRU NEGATIVE 10/11/2022 07:00 PM    COLORU Yellow 10/11/2022 07:00 PM    PHUR 6.0 10/11/2022 07:00 PM    WBCUA 2 TO 5 10/11/2022 07:00 PM    RBCUA 2 TO 5 10/11/2022 07:00 PM    MUCUS TRACE 10/11/2022 07:00 PM    BACTERIA 1+ 10/11/2022 07:00 PM    SPECGRAV 1.020 10/11/2022 07:00 PM    LEUKOCYTESUR NEGATIVE 10/11/2022 07:00 PM    UROBILINOGEN ELEVATED 10/11/2022 07:00 PM    BILIRUBINUR NEGATIVE 10/11/2022 07:00 PM    GLUCOSEU 3+ 10/11/2022 07:00 PM    KETUA NEGATIVE 10/11/2022 07:00 PM       HgBA1c:    Lab Results   Component Value Date/Time    LABA1C 6.8 10/11/2022 09:35 AM       Lactic Acid: No results found for: LACTA     Troponin: No results for input(s): TROPONINI in the last 72 hours. CRP:  No results for input(s): CRP in the last 72 hours. Radiology/Imaging:  XR CHEST PORTABLE   Final Result   No acute cardiopulmonary process         CT Head W/O Contrast   Final Result   No acute intracranial abnormality.       Prominent senescent changes. These appear more prominent than usually seen   in a patient of this age, but could be due to old injuries, various   medications, or history of substance abuse.                ASSESSMENT / PLAN:  Hypernatremia  Appreciate Nephrology  Continue D5  Stop normal saline  Trend labs  Dehydration  Continue IV fluids  Trend labs  Possibly due to oversedation, decreased oral intake, psychotropic medications including other medications such as lactulose and Jardiance  Type 2 diabetes  POCT before meals and at bedtime  Insulin sliding scale  Hypoglycemia protocol  Hold Jardiance  Major depressive disorder with psychotic features  Restart Geodon   Continue Cogentin, Depakote, Risperdal, Trintellix, Ativan  Hold Haldol, Abilify  Bacteremia  Stop Levaquin  Start Ancef  BC - Staph Aureus - awaiting denitrification and sensitivity  Essential hypertension  Continue Lopressor  Nutrition status:   at risk for malnutrition  Dietician consult initiated  Hospital Prophylaxis:   DVT: Lovenox   Stress Ulcer: H2 Blocker   High risk medications: none   Disposition:    Discharge plan is pending      ROB Elizondo - CNP , ROB, NP-C  Hospitalist Medicine        10/13/2022, 11:50 AM

## 2022-10-13 NOTE — PROGRESS NOTES
Writer in room for vitals and assessment. Patient resting in bed, respirations even and unlabored while on room air. Patient very lethargic, vitals stable. Assessment completed as charted. Arnold emptied. Patient hard to arouse to give medications, once awake patient had garbled speech and did not follow commands. Medications given with pudding. Patient went right back to sleep after meds. Call light within reach, bed alarm on.

## 2022-10-13 NOTE — PROGRESS NOTES
Writer in room for vitals and assessment. Patient resting in bed, respirations even and unlabored while on room air. Patient still very lethargic, vitals stable. No change since afternoon assessment. Call light within reach, bed alarm on.

## 2022-10-13 NOTE — PLAN OF CARE
Problem: Discharge Planning  Goal: Discharge to home or other facility with appropriate resources  Outcome: Progressing   Discharge plan is back to [facility] upon discharge. LSW providing assistance for discharge needs. Will continue to monitor. Problem: Skin/Tissue Integrity  Goal: Absence of new skin breakdown  Description: 1. Monitor for areas of redness and/or skin breakdown  2. Assess vascular access sites hourly  Outcome: Progressing  Patient turn/repositioned every 2 hours. Patient encouraged to ambulate and use restroom every 2 hours. Patient assessed for wet bottoms as needed. Patient assessed for skin breakdown. Heels floated while in chair and bed. Problem: Safety - Adult  Goal: Free from fall injury  Outcome: Progressing  Patient's bed in lowest position. Bed/chair wheel locked. Call light within reach. Non-skid socks worn. Bedside table within reach. Bedrails up x 3. Patient is dependent. Bed alarm on. Sitter at bedside. Will continue to monitor. Problem: Nutrition Deficit:  Goal: Optimize nutritional status  Outcome: Progressing  Diet provided per selective menu as ordered. Appetite is fair. Patient able to feed self, PO fluids encouraged and IV fluids tolerated well, see flowsheet for details. Will continue to monitor. Problem: Pain  Goal: Verbalizes/displays adequate comfort level or baseline comfort level  Outcome: Progressing  Patient encouraged to let staff know when pain is present and to request pain medication when needing it. Non-pharmaceutical measures encouraged, rest and repositioning. Problem: Metabolic/Fluid and Electrolytes - Adult  Goal: Electrolytes maintained within normal limits  Outcome: Progressing  Labs drawn in the morning and monitored. Monitor I&O. Follow fluids orders. Problem: Metabolic/Fluid and Electrolytes - Adult  Goal: Glucose maintained within prescribed range  Outcome: Progressing  Patient blood sugar assessed 1 hour before meals.  Sliding scale insulin administered as needed. Other antidiabetic medications administer per orders.

## 2022-10-13 NOTE — PROGRESS NOTES
[facility]  Inpatient/Observation/Outpatient Rehabilitation    Date: 10/13/2022  Patient Name: Ankush Ca       [x] Inpatient Acute/Observation       []  Outpatient  : 1955           [x] Pt cancelled due to:  [] No Reason Given   [] Sick/ill   [x] Other:  PTA attempted treatment, however patient very agitated and stated \"no I'm not! \" when asked to complete LE exercises. Treatment stopped in order to refrain from escalating the situation. Nursing staff remained on 1:1 supervision over patient. Therapist/Assistant will attempt to see this patient, at our earliest opportunity.        Oliver Jimenez, PTA Date: 10/13/2022

## 2022-10-13 NOTE — PROGRESS NOTES
Writer in room for vitals and assessment. Patient resting in bed, respirations even and unlabored while on room air. Patient very lethargic, vitals stable. Assessment completed as charted. Arnold emptied. Call light within reach, bed alarm on, will continue to monitor.

## 2022-10-13 NOTE — PROGRESS NOTES
Entered patient's room for afternoon vital signs and head to toe reassessment. Patient resting bed at this time. Unable to assess orientation level due to patient being unable to follow commands and being lethargic. Patient wakes and opens eyes with sternal rubs. Head to toe assessment and vital signs completed at this time, see flowsheets for more details. Patient repositioned in the bed at this time. Telemetry on. Call light within reach. Bed alarm on. Bed  wheels locked. Bed in lowest position. Will continue to monitor patient.

## 2022-10-13 NOTE — PROGRESS NOTES
[facility]  Inpatient/Observation/Outpatient Rehabilitation    Date: 10/13/2022  Patient Name: Tremayne Watson       [x] Inpatient Acute/Observation       []  Outpatient  : 1955        [x] Pt cancelled due to:  [] No Reason Given   [] Sick/ill   [x] Other:  Pt has been agitated this date and is currently sleeping. Spoke with RN, holding treatment at this time to allow patient to rest.     Therapist/Assistant will attempt to see this patient, at our earliest opportunity.        Brittany Fernandes, PTA Date: 10/13/2022

## 2022-10-13 NOTE — PROGRESS NOTES
Patient attempting to exit bed at this time independently. Patient redirected several times without successful. Nursing supervisor Lucy Kim contact and 1:1 initiated at this time. Will continue to monitor patient.

## 2022-10-14 LAB
ABSOLUTE EOS #: 0.18 K/UL (ref 0–0.44)
ABSOLUTE IMMATURE GRANULOCYTE: 0.09 K/UL (ref 0–0.3)
ABSOLUTE LYMPH #: 1.55 K/UL (ref 1.1–3.7)
ABSOLUTE MONO #: 0.73 K/UL (ref 0.1–1.2)
ALBUMIN SERPL-MCNC: 3.2 G/DL (ref 3.5–5.2)
ALBUMIN/GLOBULIN RATIO: 1 (ref 1–2.5)
ALP BLD-CCNC: 68 U/L (ref 35–104)
ALT SERPL-CCNC: 25 U/L (ref 5–33)
ANION GAP SERPL CALCULATED.3IONS-SCNC: 10 MMOL/L (ref 9–17)
AST SERPL-CCNC: 18 U/L
BASOPHILS # BLD: 0 % (ref 0–2)
BASOPHILS ABSOLUTE: 0 K/UL (ref 0–0.2)
BILIRUB SERPL-MCNC: 0.5 MG/DL (ref 0.3–1.2)
BUN BLDV-MCNC: 22 MG/DL (ref 8–23)
BUN/CREAT BLD: 19 (ref 9–20)
CALCIUM SERPL-MCNC: 8.3 MG/DL (ref 8.6–10.4)
CHLORIDE BLD-SCNC: 115 MMOL/L (ref 98–107)
CO2: 27 MMOL/L (ref 20–31)
CREAT SERPL-MCNC: 1.14 MG/DL (ref 0.5–0.9)
CULTURE: ABNORMAL
EOSINOPHILS RELATIVE PERCENT: 2 % (ref 1–4)
GFR SERPL CREATININE-BSD FRML MDRD: 53 ML/MIN/1.73M2
GLUCOSE BLD-MCNC: 146 MG/DL (ref 74–100)
GLUCOSE BLD-MCNC: 157 MG/DL (ref 74–100)
GLUCOSE BLD-MCNC: 242 MG/DL (ref 74–100)
GLUCOSE BLD-MCNC: 249 MG/DL (ref 74–100)
GLUCOSE BLD-MCNC: 296 MG/DL (ref 70–99)
HCT VFR BLD CALC: 43.8 % (ref 36.3–47.1)
HEMOGLOBIN: 13.8 G/DL (ref 11.9–15.1)
IMMATURE GRANULOCYTES: 1 %
LYMPHOCYTES # BLD: 17 % (ref 24–43)
Lab: ABNORMAL
Lab: ABNORMAL
MCH RBC QN AUTO: 29.8 PG (ref 25.2–33.5)
MCHC RBC AUTO-ENTMCNC: 31.5 G/DL (ref 28.4–34.8)
MCV RBC AUTO: 94.6 FL (ref 82.6–102.9)
MONOCYTES # BLD: 8 % (ref 3–12)
MORPHOLOGY: ABNORMAL
NRBC AUTOMATED: 0.2 PER 100 WBC
PDW BLD-RTO: 13.4 % (ref 11.8–14.4)
PLATELET # BLD: ABNORMAL K/UL (ref 138–453)
PLATELET, FLUORESCENCE: 79 K/UL (ref 138–453)
PLATELET, IMMATURE FRACTION: 2.5 % (ref 1.1–10.3)
POTASSIUM SERPL-SCNC: 3.8 MMOL/L (ref 3.7–5.3)
RBC # BLD: 4.63 M/UL (ref 3.95–5.11)
SEG NEUTROPHILS: 72 % (ref 36–65)
SEGMENTED NEUTROPHILS ABSOLUTE COUNT: 6.55 K/UL (ref 1.5–8.1)
SODIUM BLD-SCNC: 143 MMOL/L (ref 135–144)
SODIUM BLD-SCNC: 146 MMOL/L (ref 135–144)
SODIUM BLD-SCNC: 148 MMOL/L (ref 135–144)
SODIUM BLD-SCNC: 152 MMOL/L (ref 135–144)
SPECIMEN DESCRIPTION: ABNORMAL
SPECIMEN DESCRIPTION: ABNORMAL
TOTAL PROTEIN: 6.5 G/DL (ref 6.4–8.3)
WBC # BLD: 9.1 K/UL (ref 3.5–11.3)

## 2022-10-14 PROCEDURE — 94761 N-INVAS EAR/PLS OXIMETRY MLT: CPT

## 2022-10-14 PROCEDURE — 85055 RETICULATED PLATELET ASSAY: CPT

## 2022-10-14 PROCEDURE — 6370000000 HC RX 637 (ALT 250 FOR IP): Performed by: NURSE PRACTITIONER

## 2022-10-14 PROCEDURE — 2580000003 HC RX 258: Performed by: INTERNAL MEDICINE

## 2022-10-14 PROCEDURE — 2580000003 HC RX 258: Performed by: FAMILY MEDICINE

## 2022-10-14 PROCEDURE — 85025 COMPLETE CBC W/AUTO DIFF WBC: CPT

## 2022-10-14 PROCEDURE — 84295 ASSAY OF SERUM SODIUM: CPT

## 2022-10-14 PROCEDURE — 1200000000 HC SEMI PRIVATE

## 2022-10-14 PROCEDURE — 36415 COLL VENOUS BLD VENIPUNCTURE: CPT

## 2022-10-14 PROCEDURE — 6360000002 HC RX W HCPCS: Performed by: NURSE PRACTITIONER

## 2022-10-14 PROCEDURE — 6370000000 HC RX 637 (ALT 250 FOR IP): Performed by: FAMILY MEDICINE

## 2022-10-14 PROCEDURE — 82947 ASSAY GLUCOSE BLOOD QUANT: CPT

## 2022-10-14 PROCEDURE — 80053 COMPREHEN METABOLIC PANEL: CPT

## 2022-10-14 PROCEDURE — 99232 SBSQ HOSP IP/OBS MODERATE 35: CPT | Performed by: INTERNAL MEDICINE

## 2022-10-14 RX ORDER — HALOPERIDOL 5 MG/1
5 TABLET ORAL 3 TIMES DAILY
Status: DISCONTINUED | OUTPATIENT
Start: 2022-10-14 | End: 2022-10-19

## 2022-10-14 RX ORDER — HALOPERIDOL 5 MG/1
5 TABLET ORAL EVERY 6 HOURS PRN
Status: DISCONTINUED | OUTPATIENT
Start: 2022-10-14 | End: 2022-10-14

## 2022-10-14 RX ORDER — LORAZEPAM 2 MG/ML
1 INJECTION INTRAMUSCULAR ONCE
Status: COMPLETED | OUTPATIENT
Start: 2022-10-14 | End: 2022-10-14

## 2022-10-14 RX ADMIN — METOPROLOL TARTRATE 50 MG: 50 TABLET, FILM COATED ORAL at 20:32

## 2022-10-14 RX ADMIN — ASPIRIN 81 MG: 81 TABLET, COATED ORAL at 08:29

## 2022-10-14 RX ADMIN — POTASSIUM CHLORIDE 10 MEQ: 10 TABLET, EXTENDED RELEASE ORAL at 08:29

## 2022-10-14 RX ADMIN — BENZTROPINE MESYLATE 1 MG: 1 TABLET ORAL at 08:30

## 2022-10-14 RX ADMIN — INSULIN LISPRO 2 UNITS: 100 INJECTION, SOLUTION INTRAVENOUS; SUBCUTANEOUS at 08:32

## 2022-10-14 RX ADMIN — PRAVASTATIN SODIUM 40 MG: 20 TABLET ORAL at 08:30

## 2022-10-14 RX ADMIN — THIAMINE HCL TAB 100 MG 100 MG: 100 TAB at 08:30

## 2022-10-14 RX ADMIN — METOPROLOL TARTRATE 50 MG: 50 TABLET, FILM COATED ORAL at 08:30

## 2022-10-14 RX ADMIN — CEFAZOLIN 2000 MG: 2 INJECTION, POWDER, FOR SOLUTION INTRAMUSCULAR; INTRAVENOUS at 13:42

## 2022-10-14 RX ADMIN — SODIUM CHLORIDE, PRESERVATIVE FREE 10 ML: 5 INJECTION INTRAVENOUS at 08:31

## 2022-10-14 RX ADMIN — HALOPERIDOL 5 MG: 5 TABLET ORAL at 16:47

## 2022-10-14 RX ADMIN — RISPERIDONE 2 MG: 1 TABLET, FILM COATED ORAL at 08:30

## 2022-10-14 RX ADMIN — LORAZEPAM 1 MG: 1 TABLET ORAL at 22:24

## 2022-10-14 RX ADMIN — DIVALPROEX SODIUM 125 MG: 125 CAPSULE ORAL at 08:29

## 2022-10-14 RX ADMIN — HALOPERIDOL 5 MG: 5 TABLET ORAL at 09:46

## 2022-10-14 RX ADMIN — RISPERIDONE 2 MG: 1 TABLET, FILM COATED ORAL at 20:32

## 2022-10-14 RX ADMIN — FLUCONAZOLE 100 MG: 2 INJECTION, SOLUTION INTRAVENOUS at 12:33

## 2022-10-14 RX ADMIN — BENZTROPINE MESYLATE 1 MG: 1 TABLET ORAL at 20:32

## 2022-10-14 RX ADMIN — DEXTROSE MONOHYDRATE: 50 INJECTION, SOLUTION INTRAVENOUS at 04:18

## 2022-10-14 RX ADMIN — CEFAZOLIN 2000 MG: 2 INJECTION, POWDER, FOR SOLUTION INTRAMUSCULAR; INTRAVENOUS at 04:20

## 2022-10-14 RX ADMIN — PANTOPRAZOLE SODIUM 40 MG: 40 TABLET, DELAYED RELEASE ORAL at 08:29

## 2022-10-14 RX ADMIN — DIVALPROEX SODIUM 125 MG: 125 CAPSULE ORAL at 20:32

## 2022-10-14 RX ADMIN — LORAZEPAM 1 MG: 1 TABLET ORAL at 08:30

## 2022-10-14 RX ADMIN — LORAZEPAM 1 MG: 2 INJECTION INTRAMUSCULAR; INTRAVENOUS at 10:55

## 2022-10-14 RX ADMIN — HALOPERIDOL 5 MG: 5 TABLET ORAL at 20:32

## 2022-10-14 RX ADMIN — NYSTATIN 500000 UNITS: 100000 SUSPENSION ORAL at 08:29

## 2022-10-14 RX ADMIN — FOLIC ACID 1 MG: 1 TABLET ORAL at 08:30

## 2022-10-14 RX ADMIN — CEFAZOLIN 2000 MG: 2 INJECTION, POWDER, FOR SOLUTION INTRAMUSCULAR; INTRAVENOUS at 20:38

## 2022-10-14 RX ADMIN — DIVALPROEX SODIUM 125 MG: 125 CAPSULE ORAL at 15:00

## 2022-10-14 RX ADMIN — SODIUM CHLORIDE, PRESERVATIVE FREE 10 ML: 5 INJECTION INTRAVENOUS at 20:34

## 2022-10-14 NOTE — PROGRESS NOTES
Patient trying to crawl out of bed at this time independently even after being redirected. Will continue to monitor. Nurse at bedside.

## 2022-10-14 NOTE — PROGRESS NOTES
Patient trying to crawl out of chair at this time, independently. Patient redirected and enforced how to use call light. Patient given PRN Ativan at this time, see MAR. Will continue to monitor patient.

## 2022-10-14 NOTE — PROGRESS NOTES
Due to continued behaviors and numerous attempts to get out of bed unassisted, Skyler Cortez RN Supervisor notified. 1:1 sitter initiated per Supervisor instructions. Will continue to monitor.

## 2022-10-14 NOTE — PROGRESS NOTES
Patient is restless in bed. She is continuously trying to climb out of the bed and throw her legs off of the bed. Patient will answer writers questions to some degree and will follow some simple commands with but patient will not settle down in the bed. Vitals are stable and writer continues to sit 1:1 for patient's safety.

## 2022-10-14 NOTE — PROGRESS NOTES
Pt now awake, oriented to self only, pulling at meesk and IV, attempting to take off gown, attempting to throw legs over side rails. Pt re-directed numerous times without any effect. Will continue to monitor.

## 2022-10-14 NOTE — PROGRESS NOTES
Sima Sevilla CNP, informed of continued behaviors, numerous attempts to get out of bed. N.O. Ativan 1mg IV once  Will continue to monitor.

## 2022-10-14 NOTE — PROGRESS NOTES
[facility]   Nephrology progress note  10/14/2022, 11:07 AM      Pt Name:    Ananya Gates  MRN:     [id number]     YOB: 1955  Admit Date:    10/11/2022  9:22 AM    Chief Complaint: Nephrology following for acute kidney injury and hypernatremia. Subjective:  Patient seen and examined  Not much communicative however she looks much more awake and alert today  No distress    Objective:  24HR INTAKE/OUTPUT:    Intake/Output Summary (Last 24 hours) at 10/14/2022 1107  Last data filed at 10/14/2022 0848  Gross per 24 hour   Intake 4596 ml   Output 1501 ml   Net 3095 ml      Admission weight: 151 lb 6.4 oz (68.7 kg)  Wt Readings from Last 3 Encounters:   10/14/22 154 lb 4.8 oz (70 kg)   04/22/21 180 lb (81.6 kg)        Vitals :   Vitals:    10/13/22 1830 10/13/22 2340 10/14/22 0415 10/14/22 0628   BP: 132/88 (!) 146/76  121/63   Pulse: 60 59  69   Resp: 20 20  18   Temp: 96.8 °F (36 °C) (!) 96.7 °F (35.9 °C)  97.2 °F (36.2 °C)   TempSrc: Temporal Temporal  Temporal   SpO2: 98% 95%  98%   Weight:   154 lb 4.8 oz (70 kg)    Height:           Physical examination  General Appearance:  Well developed.  No distress  Mouth/Throat:  Oral mucosa dry  Neck:  Supple, no JVD  Lungs:  Breath sounds: clear  Heart[de-identified]  S1,S2 heard  Abdomen:  Soft, non - tender  Musculoskeletal:  Edema -no significant edema noted    Medications:  Infusion:    [Held by provider] dextrose Stopped (10/14/22 0810)    dextrose      sodium chloride       Meds:    nystatin  5 mL Oral 4x Daily    fluconazole  100 mg IntraVENous Q24H    ceFAZolin  2,000 mg IntraVENous Q8H    insulin lispro  0-8 Units SubCUTAneous TID WC    insulin lispro  0-4 Units SubCUTAneous Nightly    aspirin  81 mg Oral Daily    benztropine  1 mg Oral BID    divalproex  125 mg Oral TID    folic acid  1 mg Oral Daily    metoprolol tartrate  50 mg Oral BID    pantoprazole  40 mg Oral QAM AC    potassium chloride  10 mEq Oral Daily    pravastatin  40 mg Oral Daily    risperiDONE  2 mg Oral BID    vitamin B-1  100 mg Oral Daily    VORTIoxetine HBr  20 mg Oral Daily    sodium chloride flush  5-40 mL IntraVENous 2 times per day       Lab Data :  CBC:   Recent Labs     10/12/22  0600 10/13/22  0553 10/14/22  0615   WBC 12.5* 13.5* 9.1   HGB 14.6 14.2 13.8   HCT 46.2 46.1 43.8   PLT See Reflexed IPF Result See Reflexed IPF Result See Reflexed IPF Result     CMP:  Recent Labs     10/13/22  0553 10/13/22  1326 10/13/22  2212 10/14/22  0427 10/14/22  0615 10/14/22  1033   * 159*   < > 146* 152* 143   K 4.0 4.0  --   --  3.8  --    * 126*  --   --  115*  --    CO2 26 26  --   --  27  --    BUN 33* 29*  --   --  22  --    CREATININE 1.16* 1.03*  --   --  1.14*  --    GLUCOSE 281* 252*  --   --  296*  --    CALCIUM 9.0 8.8  --   --  8.3*  --     < > = values in this interval not displayed. Hepatic:   Recent Labs     10/12/22  0600 10/13/22  0553 10/14/22  0615   LABALBU 3.5 3.5 3.2*   AST 53* 21 18   ALT 56* 39* 25   BILITOT 1.0 0.6 0.5   ALKPHOS 58 71 68       Assessment and Plan:  Renal -acute kidney injury most likely secondary to volume depletion  Currently improving with IV fluids almost back to baseline  Monitor BMP closely  Electrolytes -hyponatremia secondary to intracellular volume depletion initially appears to be receiving some normal saline now on D5 W. Sodium has improved to 152 this morning and I have stopped the D5W repeat sodium at 1030 is 143 down disease and other I will recheck sodium level in a few hours. Acid-base status appears to be stable  Hx of diabetes mellitus  Major depressive disorder with psychotic features  Essential hypertension stable  Meds reviewed and discussed with nursing staff and the primary team    Anastacio Bynum MD  [facility]    This report has been created using voice recognition software.  It may contain minor errors which are inherent in voice recognition technology

## 2022-10-14 NOTE — PROGRESS NOTES
Writer in room for second vitals and assessment. Patient resting in bed, respirations even and unlabored while on room air. Patient still lethargic, vitals stable. Assessment completed as charted. No change from previous assessment. Call light within reach, bed alarm on, will continue to monitor.

## 2022-10-14 NOTE — PROGRESS NOTES
Patient trying to crawl out of chair independently, even after being redirected. Patient transferred back to bed to see if it makes the patient more comfortable. Will continue to monitor. Bed alarm on, bed wheels locked, bed in lowest position.

## 2022-10-14 NOTE — PLAN OF CARE
Problem: Discharge Planning  Goal: Discharge to home or other facility with appropriate resources  Outcome: Progressing   Discharge plan is skilled nursing facility upon discharge. LSW providing assistance for discharge needs. Will continue to monitor. Problem: Skin/Tissue Integrity  Goal: Absence of new skin breakdown  Description: 1. Monitor for areas of redness and/or skin breakdown  2. Assess vascular access sites hourly  Outcome: Progressing  Patient turn/repositioned every 2 hours. Patient encouraged to ambulate and use restroom every 2 hours. Patient assessed for wet bottoms as needed. Patient assessed for skin breakdown. Heels floated while in chair and bed. Problem: Safety - Adult  Goal: Free from fall injury  Outcome: Progressing  Patient's bed in lowest position. Bed/chair wheel locked. Call light within reach. Non-skid socks worn. Bedside table within reach. Bedrails up x 3. Patient is up with 2 people for transfers. Sitter at bedside when needed. Bed alarm on. Will continue to monitor. Problem: Nutrition Deficit:  Goal: Optimize nutritional status  Outcome: Progressing   Diet provided per selective menu as ordered. Appetite is fair. Patient able to feed self, PO fluids encouraged, see flowsheet for details. Will continue to monitor. Problem: Pain  Goal: Verbalizes/displays adequate comfort level or baseline comfort level  Outcome: Progressing   Patient encouraged to let staff know when pain is present and to request pain medication when needing it. Non-pharmaceutical measures encouraged, rest and repositioning. Problem: Metabolic/Fluid and Electrolytes - Adult  Goal: Electrolytes maintained within normal limits  Outcome: Progressing   Continue to monitor lab values and do q6h Na+ blood draws. Problem: Metabolic/Fluid and Electrolytes - Adult  Goal: Glucose maintained within prescribed range  Outcome: Progressing   Patient blood sugar assessed 1 hour before meals.  Sliding scale insulin administered as needed. Other antidiabetic medications administer per orders.

## 2022-10-14 NOTE — PROGRESS NOTES
Entered patient's room for morning vital signs and head to toe assessment. Patient resting bed at this time. A&O x 1 (oriented to person), calm, and cooperative. Patient ambulated/transferred to chair at this time. Patient able to follow commands. Patient's lips and tongue are pink but still dry. Patient complained of thirst at this time. Fresh water given at this time. Head to toe assessment and vital signs completed at this time, see flowsheets for more details. Patient denies no more needs at this time. Call light within reach. Chair alarm on. Chair wheels locked. Bed in lowest position. Will continue to monitor patient.

## 2022-10-14 NOTE — PROGRESS NOTES
[facility]  Inpatient/Observation/Outpatient Rehabilitation    Date: 10/14/2022  Patient Name: Mirela Hills       [x] Inpatient Acute/Observation       []  Outpatient  : 1955       [] Pt no showed for scheduled appointment    [x] Pt refused/declined therapy at this time due to:    Pt oriented to person only at this time, declined therex and ADLs at this time. Became agitated and pulled on catheter. Clinician attempted to reorient to other activity. Nurse Dennis James made aware.       [] Pt cancelled due to:  [] No Reason Given   [] Sick/ill   [] Other:        IRASEMA Okeefe Date: 10/14/2022

## 2022-10-14 NOTE — PROGRESS NOTES
[facility]  Inpatient/Observation/Outpatient Rehabilitation    Date: 10/14/2022  Patient Name: Zoie Mares       [] Inpatient Acute/Observation       []  Outpatient  : 1955       [] Pt no showed for scheduled appointment    [] Pt refused/declined therapy at this time due to:           [x] Pt cancelled due to:  [] No Reason Given   [] Sick/ill   [] Other: Pt. Sleeping at this time, RN requested to hold at this time. Therapist/Assistant will attempt to see this patient, at our earliest opportunity.        Shruti Reinoso, PTA Date: 10/14/2022

## 2022-10-14 NOTE — PROGRESS NOTES
Pt resting quietly in bed, eyes closed. 1:1 sitter stopped at this time. Sandy Cerna RN Supervisor, aware. Will continue to monitor.

## 2022-10-14 NOTE — PROGRESS NOTES
Progress Note    SUBJECTIVE:    Patient seen for f/u of Hypernatremia. She sitting up more alert today but agitated at times and impulsive. ROS: Unable due to altered mental status     All other systems were reviewed with the patient and are negative unless otherwise stated in HPI      OBJECTIVE:      Vitals:   Vitals:    10/14/22 0628   BP: 121/63   Pulse: 69   Resp: 18   Temp: 97.2 °F (36.2 °C)   SpO2: 98%     Weight: 154 lb 4.8 oz (70 kg)   Height: 5' 6\" (167.6 cm)     Weight  Wt Readings from Last 3 Encounters:   10/14/22 154 lb 4.8 oz (70 kg)   04/22/21 180 lb (81.6 kg)     Body mass index is 24.9 kg/m². 24HR INTAKE/OUTPUT:      Intake/Output Summary (Last 24 hours) at 10/14/2022 1032  Last data filed at 10/14/2022 0848  Gross per 24 hour   Intake 4596 ml   Output 1501 ml   Net 3095 ml     -----------------------------------------------------------------  Exam:    GEN:    Awake, confused and agitated  EYES:   EOMI, pupils equal   NECK: Supple. No lymphadenopathy. No carotid bruit  FACE/MOUTH:  left parotitis, oral thrush  CVS:     regular rate and rhythm, no audible murmur  PULM:  CTA, no wheezes, rales or rhonchi, no acute respiratory distress  ABD:     Bowels sounds normal.  Abdomen is soft. No distention. no tenderness to palpation. EXT:     no edema bilaterally . No calf tenderness. NEURO: Moves all extremities. Motor and sensory are grossly intact. Visible tremors   SKIN:    No rashes.   No skin lesions    -----------------------------------------------------------------    Diagnostic Data:      Complete Blood Count:   Recent Labs     10/12/22  0600 10/13/22  0553 10/14/22  0615   WBC 12.5* 13.5* 9.1   RBC 4.85 4.79 4.63   HGB 14.6 14.2 13.8   HCT 46.2 46.1 43.8   MCV 95.3 96.2 94.6   MCH 30.1 29.6 29.8   MCHC 31.6 30.8 31.5   RDW 13.5 13.9 13.4   PLT See Reflexed IPF Result See Reflexed IPF Result See Reflexed IPF Result        Last 3 Blood Glucose:   Recent Labs     10/11/22  1310 10/11/22  1508 10/12/22  0600 10/13/22  0553 10/13/22  1326 10/14/22  0615   GLUCOSE 397* 262* 187* 281* 252* 296*        Comprehensive Metabolic Profile:   Recent Labs     10/12/22  0600 10/12/22  1335 10/13/22  0553 10/13/22  1326 10/13/22  2212 10/14/22  0427 10/14/22  0615   *   < > 161* 159* 147* 146* 152*   K 4.3  --  4.0 4.0  --   --  3.8   *   < > 125* 126*  --   --  115*   CO2 24  --  26 26  --   --  27   BUN 47*  --  33* 29*  --   --  22   CREATININE 1.28*  --  1.16* 1.03*  --   --  1.14*   GLUCOSE 187*  --  281* 252*  --   --  296*   CALCIUM 8.5*  --  9.0 8.8  --   --  8.3*   PROT 6.7  --  6.6  --   --   --  6.5   LABALBU 3.5  --  3.5  --   --   --  3.2*   BILITOT 1.0  --  0.6  --   --   --  0.5   ALKPHOS 58  --  71  --   --   --  68   AST 53*  --  21  --   --   --  18   ALT 56*  --  39*  --   --   --  25    < > = values in this interval not displayed. Urinalysis:   Lab Results   Component Value Date/Time    NITRU NEGATIVE 10/11/2022 07:00 PM    COLORU Yellow 10/11/2022 07:00 PM    PHUR 6.0 10/11/2022 07:00 PM    WBCUA 2 TO 5 10/11/2022 07:00 PM    RBCUA 2 TO 5 10/11/2022 07:00 PM    MUCUS TRACE 10/11/2022 07:00 PM    BACTERIA 1+ 10/11/2022 07:00 PM    SPECGRAV 1.020 10/11/2022 07:00 PM    LEUKOCYTESUR NEGATIVE 10/11/2022 07:00 PM    UROBILINOGEN ELEVATED 10/11/2022 07:00 PM    BILIRUBINUR NEGATIVE 10/11/2022 07:00 PM    GLUCOSEU 3+ 10/11/2022 07:00 PM    KETUA NEGATIVE 10/11/2022 07:00 PM       HgBA1c:    Lab Results   Component Value Date/Time    LABA1C 6.8 10/11/2022 09:35 AM       Lactic Acid: No results found for: LACTA     Troponin: No results for input(s): TROPONINI in the last 72 hours. CRP:  No results for input(s): CRP in the last 72 hours. Radiology/Imaging:  XR CHEST PORTABLE   Final Result   No acute cardiopulmonary process         CT Head W/O Contrast   Final Result   No acute intracranial abnormality. Prominent senescent changes.   These appear more prominent than usually seen   in a patient of this age, but could be due to old injuries, various   medications, or history of substance abuse.                ASSESSMENT / PLAN:  Hypernatremia  Appreciate Nephrology  Continue D5  Stop normal saline  Trend labs  Dehydration  Continue IV fluids  Trend labs-improving  Possibly due to oversedation, decreased oral intake, psychotropic medications including other medications such as lactulose and Jardiance  Type 2 diabetes  POCT before meals and at bedtime  Insulin sliding scale  Hypoglycemia protocol  Hold Jardiance  Major depressive disorder with psychotic features  Stop Geodon   Continue Cogentin, Depakote, Risperdal, Trintellix, Ativan  Start Oral Haldol  Ativan 1 mg IV x 1 now  Hold Abilify  Bacteremia  Stop Levaquin  Continue  Ancef  BC - Staph Aureus - awaiting denitrification and sensitivity  Thrush  IV Diflucan  Essential hypertension  Continue Lopressor  Nutrition status:   at risk for malnutrition  Dietician consult initiated  Hospital Prophylaxis:   DVT: Lovenox   Stress Ulcer: H2 Blocker   High risk medications: none   Disposition:    Discharge plan is - Return to [facility] when medically cleared      ROB River - CNP , ROB, NP-C  Hospitalist Medicine        10/14/2022, 10:32 AM

## 2022-10-14 NOTE — PROGRESS NOTES
Entered patient's room for ternoon vital signs and head to toe trassessment. Patient resting in bed at this time. A&O x 1 (oriented to person) and irritable. Patient trying to crawl out of bed independently at this time. Issac Alford RN supervisor called and 1:1 sitter started again. Vital signs and head to toe reassessment completed at this time, see flowsheets for more details. Patient's brief changed and catheter care completed at this time. Patient repositioned at this time. Call light within reach. Bed alarm on. Bed wheels locked. Bed in lowest position. Will continue to monitor patient.

## 2022-10-15 LAB
ANION GAP SERPL CALCULATED.3IONS-SCNC: 10 MMOL/L (ref 9–17)
BUN BLDV-MCNC: 16 MG/DL (ref 8–23)
BUN/CREAT BLD: 17 (ref 9–20)
CALCIUM SERPL-MCNC: 8.3 MG/DL (ref 8.6–10.4)
CHLORIDE BLD-SCNC: 115 MMOL/L (ref 98–107)
CO2: 27 MMOL/L (ref 20–31)
CREAT SERPL-MCNC: 0.93 MG/DL (ref 0.5–0.9)
GFR SERPL CREATININE-BSD FRML MDRD: >60 ML/MIN/1.73M2
GLUCOSE BLD-MCNC: 118 MG/DL (ref 74–100)
GLUCOSE BLD-MCNC: 121 MG/DL (ref 74–100)
GLUCOSE BLD-MCNC: 162 MG/DL (ref 70–99)
GLUCOSE BLD-MCNC: 222 MG/DL (ref 74–100)
POTASSIUM SERPL-SCNC: 4.3 MMOL/L (ref 3.7–5.3)
SODIUM BLD-SCNC: 147 MMOL/L (ref 135–144)
SODIUM BLD-SCNC: 152 MMOL/L (ref 135–144)

## 2022-10-15 PROCEDURE — 82947 ASSAY GLUCOSE BLOOD QUANT: CPT

## 2022-10-15 PROCEDURE — 84295 ASSAY OF SERUM SODIUM: CPT

## 2022-10-15 PROCEDURE — 6360000002 HC RX W HCPCS: Performed by: NURSE PRACTITIONER

## 2022-10-15 PROCEDURE — 94761 N-INVAS EAR/PLS OXIMETRY MLT: CPT

## 2022-10-15 PROCEDURE — 80048 BASIC METABOLIC PNL TOTAL CA: CPT

## 2022-10-15 PROCEDURE — 6370000000 HC RX 637 (ALT 250 FOR IP): Performed by: FAMILY MEDICINE

## 2022-10-15 PROCEDURE — 99232 SBSQ HOSP IP/OBS MODERATE 35: CPT | Performed by: INTERNAL MEDICINE

## 2022-10-15 PROCEDURE — 6370000000 HC RX 637 (ALT 250 FOR IP): Performed by: NURSE PRACTITIONER

## 2022-10-15 PROCEDURE — 2580000003 HC RX 258: Performed by: INTERNAL MEDICINE

## 2022-10-15 PROCEDURE — 36415 COLL VENOUS BLD VENIPUNCTURE: CPT

## 2022-10-15 PROCEDURE — 1200000000 HC SEMI PRIVATE

## 2022-10-15 RX ORDER — DEXTROSE MONOHYDRATE 50 MG/ML
INJECTION, SOLUTION INTRAVENOUS CONTINUOUS
Status: DISCONTINUED | OUTPATIENT
Start: 2022-10-15 | End: 2022-10-20

## 2022-10-15 RX ORDER — CEFAZOLIN SODIUM 1 G/3ML
INJECTION, POWDER, FOR SOLUTION INTRAMUSCULAR; INTRAVENOUS
Status: DISPENSED
Start: 2022-10-15 | End: 2022-10-15

## 2022-10-15 RX ADMIN — DEXTROSE MONOHYDRATE: 50 INJECTION, SOLUTION INTRAVENOUS at 15:49

## 2022-10-15 RX ADMIN — DIVALPROEX SODIUM 125 MG: 125 CAPSULE ORAL at 20:36

## 2022-10-15 RX ADMIN — FLUCONAZOLE 100 MG: 2 INJECTION, SOLUTION INTRAVENOUS at 12:25

## 2022-10-15 RX ADMIN — RISPERIDONE 2 MG: 1 TABLET, FILM COATED ORAL at 20:36

## 2022-10-15 RX ADMIN — INSULIN LISPRO 2 UNITS: 100 INJECTION, SOLUTION INTRAVENOUS; SUBCUTANEOUS at 16:49

## 2022-10-15 RX ADMIN — DIVALPROEX SODIUM 125 MG: 125 CAPSULE ORAL at 14:11

## 2022-10-15 RX ADMIN — CEFAZOLIN 2000 MG: 2 INJECTION, POWDER, FOR SOLUTION INTRAMUSCULAR; INTRAVENOUS at 13:39

## 2022-10-15 RX ADMIN — CEFAZOLIN 2000 MG: 2 INJECTION, POWDER, FOR SOLUTION INTRAMUSCULAR; INTRAVENOUS at 19:33

## 2022-10-15 RX ADMIN — METOPROLOL TARTRATE 50 MG: 50 TABLET, FILM COATED ORAL at 20:36

## 2022-10-15 RX ADMIN — CEFAZOLIN 2000 MG: 2 INJECTION, POWDER, FOR SOLUTION INTRAMUSCULAR; INTRAVENOUS at 04:22

## 2022-10-15 RX ADMIN — HALOPERIDOL 5 MG: 5 TABLET ORAL at 14:11

## 2022-10-15 RX ADMIN — NYSTATIN 500000 UNITS: 100000 SUSPENSION ORAL at 16:59

## 2022-10-15 RX ADMIN — BENZTROPINE MESYLATE 1 MG: 1 TABLET ORAL at 20:36

## 2022-10-15 RX ADMIN — HALOPERIDOL 5 MG: 5 TABLET ORAL at 20:35

## 2022-10-15 RX ADMIN — LORAZEPAM 1 MG: 1 TABLET ORAL at 18:22

## 2022-10-15 RX ADMIN — NYSTATIN 500000 UNITS: 100000 SUSPENSION ORAL at 20:35

## 2022-10-15 ASSESSMENT — PAIN SCALES - PAIN ASSESSMENT IN ADVANCED DEMENTIA (PAINAD)
CONSOLABILITY: 0
BREATHING: 0
NEGVOCALIZATION: 0
BREATHING: 0
FACIALEXPRESSION: 0
BODYLANGUAGE: 0
FACIALEXPRESSION: 0
BREATHING: 0
TOTALSCORE: 0
BREATHING: 0
BODYLANGUAGE: 0
CONSOLABILITY: 0
CONSOLABILITY: 0
BODYLANGUAGE: 0
NEGVOCALIZATION: 0
FACIALEXPRESSION: 0
FACIALEXPRESSION: 0
CONSOLABILITY: 0
TOTALSCORE: 0
CONSOLABILITY: 0
NEGVOCALIZATION: 0
NEGVOCALIZATION: 0
TOTALSCORE: 0
BREATHING: 0
FACIALEXPRESSION: 0
BODYLANGUAGE: 0
BODYLANGUAGE: 0
NEGVOCALIZATION: 0

## 2022-10-15 NOTE — PROGRESS NOTES
Patient continues to attempt to climb out of the bed. Writer must physically put patient's legs back into the bed because patient will not do so independently. Writer remains 1:1 for patient safety.

## 2022-10-15 NOTE — PROGRESS NOTES
Progress Note    SUBJECTIVE:    Patient seen for f/u of Hypernatremia. She resting in bed stated she is feeling better. Follows commands at this time      ROS: Unable due to altered mental status     All other systems were reviewed with the patient and are negative unless otherwise stated in HPI      OBJECTIVE:      Vitals:   Vitals:    10/15/22 0315   BP: 107/61   Pulse: 70   Resp: 18   Temp: 97.8 °F (36.6 °C)   SpO2: 97%     Weight: 153 lb (69.4 kg)   Height: 5' 6\" (167.6 cm)     Weight  Wt Readings from Last 3 Encounters:   10/15/22 153 lb (69.4 kg)   04/22/21 180 lb (81.6 kg)     Body mass index is 24.69 kg/m². 24HR INTAKE/OUTPUT:      Intake/Output Summary (Last 24 hours) at 10/15/2022 0718  Last data filed at 10/15/2022 0602  Gross per 24 hour   Intake 745 ml   Output 1701 ml   Net -956 ml     -----------------------------------------------------------------  Exam:    GEN:    Awake, confused and agitated  EYES:   EOMI, pupils equal   NECK: Supple. No lymphadenopathy. No carotid bruit  FACE/MOUTH:  left parotitis, oral thrush improved but mucous membranes dry  CVS:     regular rate and rhythm, no audible murmur  PULM:  CTA, no wheezes, rales or rhonchi, no acute respiratory distress  ABD:     Bowels sounds normal.  Abdomen is soft. No distention. no tenderness to palpation. EXT:     no edema bilaterally . No calf tenderness. NEURO: Moves all extremities. Motor and sensory are grossly intact. Visible tremors   SKIN:    No rashes.   No skin lesions    -----------------------------------------------------------------    Diagnostic Data:      Complete Blood Count:   Recent Labs     10/13/22  0553 10/14/22  0615   WBC 13.5* 9.1   RBC 4.79 4.63   HGB 14.2 13.8   HCT 46.1 43.8   MCV 96.2 94.6   MCH 29.6 29.8   MCHC 30.8 31.5   RDW 13.9 13.4   PLT See Reflexed IPF Result See Reflexed IPF Result        Last 3 Blood Glucose:   Recent Labs     10/13/22  0553 10/13/22  1326 10/14/22  0615   GLUCOSE 281* 252* 296*        Comprehensive Metabolic Profile:   Recent Labs     10/13/22  0553 10/13/22  1326 10/13/22  2212 10/14/22  0615 10/14/22  1033 10/14/22  1529   * 159*   < > 152* 143 148*   K 4.0 4.0  --  3.8  --   --    * 126*  --  115*  --   --    CO2 26 26  --  27  --   --    BUN 33* 29*  --  22  --   --    CREATININE 1.16* 1.03*  --  1.14*  --   --    GLUCOSE 281* 252*  --  296*  --   --    CALCIUM 9.0 8.8  --  8.3*  --   --    PROT 6.6  --   --  6.5  --   --    LABALBU 3.5  --   --  3.2*  --   --    BILITOT 0.6  --   --  0.5  --   --    ALKPHOS 71  --   --  68  --   --    AST 21  --   --  18  --   --    ALT 39*  --   --  25  --   --     < > = values in this interval not displayed. Urinalysis:   Lab Results   Component Value Date/Time    NITRU NEGATIVE 10/11/2022 07:00 PM    COLORU Yellow 10/11/2022 07:00 PM    PHUR 6.0 10/11/2022 07:00 PM    WBCUA 2 TO 5 10/11/2022 07:00 PM    RBCUA 2 TO 5 10/11/2022 07:00 PM    MUCUS TRACE 10/11/2022 07:00 PM    BACTERIA 1+ 10/11/2022 07:00 PM    SPECGRAV 1.020 10/11/2022 07:00 PM    LEUKOCYTESUR NEGATIVE 10/11/2022 07:00 PM    UROBILINOGEN ELEVATED 10/11/2022 07:00 PM    BILIRUBINUR NEGATIVE 10/11/2022 07:00 PM    GLUCOSEU 3+ 10/11/2022 07:00 PM    KETUA NEGATIVE 10/11/2022 07:00 PM       HgBA1c:    Lab Results   Component Value Date/Time    LABA1C 6.8 10/11/2022 09:35 AM       Lactic Acid: No results found for: LACTA     Troponin: No results for input(s): TROPONINI in the last 72 hours. CRP:  No results for input(s): CRP in the last 72 hours. Radiology/Imaging:  XR CHEST PORTABLE   Final Result   No acute cardiopulmonary process         CT Head W/O Contrast   Final Result   No acute intracranial abnormality. Prominent senescent changes. These appear more prominent than usually seen   in a patient of this age, but could be due to old injuries, various   medications, or history of substance abuse.                ASSESSMENT / PLAN:  Hypernatremia  Appreciate Nephrology  IVL  Stop normal saline  Trend labs-improving  Dehydration  IVL  Trend labs-improving  Possibly due to oversedation, decreased oral intake, psychotropic medications including other medications such as lactulose and Jardiance  Type 2 diabetes  POCT before meals and at bedtime  Insulin sliding scale  Hypoglycemia protocol  Hold Jardiance  Major depressive disorder with psychotic features  Stop Geodon   Continue Cogentin, Depakote, Risperdal, Trintellix, Ativan  Continue Oral Haldol  Hold Abilify  Bacteremia  Stop Levaquin  Continue  Ancef  BC - Staph Aureus - awaiting denitrification and sensitivity  Thrush  IV Diflucan  Essential hypertension  Continue Lopressor  Nutrition status:   at risk for malnutrition  Dietician consult initiated  Hospital Prophylaxis:   DVT: Lovenox   Stress Ulcer: H2 Blocker   High risk medications: none   Disposition:    Discharge plan is - Return to [facility] when medically cleared      Ye Polk APRN - CNP , ROB, NP-C  Hospitalist Medicine        10/15/2022, 7:18 AM

## 2022-10-15 NOTE — PROGRESS NOTES
Pt. Is laying in bed asleep at this time. Pt. Vitals are assessed, vitals are WDL. Pt. Hasw call light in reach. Pt. Appears comfortable, no signs of distress.

## 2022-10-15 NOTE — PROGRESS NOTES
Patient removed her meeks secure from her thigh. Writer is waiting for a replacement but continues to sit 1:1. Patient is awake again and trying to climb over the railing. Patient is not following commands and is unable to be redirected. Writer continues to physically put patient's legs back in the bed.

## 2022-10-15 NOTE — PROGRESS NOTES
Patient has settled down some after the ativan was given. Patient sleeping in short spurts but remains restless's even while sleeping. 1:1 continued for patient safety.

## 2022-10-15 NOTE — PROGRESS NOTES
[facility]  Inpatient/Observation/Outpatient Rehabilitation    Date: 10/15/2022  Patient Name: Zoie Mares       [x] Inpatient Acute/Observation       []  Outpatient  : 1955       [] Pt no showed for scheduled appointment    [] Pt refused/declined therapy at this time due to:           [x] Pt cancelled due to:  [] No Reason Given   [] Sick/ill   [] Other: Per RN do not disturb patient. Therapist/Assistant will attempt to see this patient, at our earliest opportunity.        Leora Vazquez, PT, DPT Date: 10/15/2022

## 2022-10-15 NOTE — PROGRESS NOTES
Patient has not been able to fall asleep and continues to climb on and shake the railings. Writer remains 1:1 for patient safety.

## 2022-10-15 NOTE — PROGRESS NOTES
[facility]  [facility]patient[facility]/Outpatient Rehabilitation    Date: 10/15/2022  Patient Name: Ernesto Jay       [] Inpatient Acute/Observation       []  Outpatient  : 1955       [] Pt no showed for scheduled appointment    [] Pt refused/declined therapy at this time due to:           [x] Pt cancelled due to:  [] No Reason Given   [] Sick/ill   [] Other:    Per RN, pt is resting and requested not to disturb pt. Therapist/Assistant will attempt to see this patient, at our earliest opportunity.        TAVO De Santiago/SWETHA Date: 10/15/2022

## 2022-10-15 NOTE — PLAN OF CARE
Problem: Discharge Planning  Goal: Discharge to home or other facility with appropriate resources  10/14/2022 2323 by Denver Rack, RN  Outcome: Progressing     Problem: ABCDS Injury Assessment  Goal: Absence of physical injury  Outcome: Progressing     Problem: Skin/Tissue Integrity  Goal: Absence of new skin breakdown  Description: 1. Monitor for areas of redness and/or skin breakdown  2. Assess vascular access sites hourly  3. Every 4-6 hours minimum:  Change oxygen saturation probe site  4. Every 4-6 hours:  If on nasal continuous positive airway pressure, respiratory therapy assess nares and determine need for appliance change or resting period.   10/14/2022 2323 by Denver Rack, RN  Outcome: Progressing     Problem: Safety - Adult  Goal: Free from fall injury  10/14/2022 2323 by Denver Rack, RN  Outcome: Progressing     Problem: Nutrition Deficit:  Goal: Optimize nutritional status  10/14/2022 2323 by Denver Rack, RN  Outcome: Progressing     Problem: Pain  Goal: Verbalizes/displays adequate comfort level or baseline comfort level  10/14/2022 2323 by Denver Rack, RN  Outcome: Progressing     Problem: Metabolic/Fluid and Electrolytes - Adult  Goal: Electrolytes maintained within normal limits  10/14/2022 2323 by Denver Rack, RN  Outcome: Progressing     Problem: Metabolic/Fluid and Electrolytes - Adult  Goal: Glucose maintained within prescribed range  10/14/2022 2323 by Denver Rack, RN  Outcome: Progressing

## 2022-10-15 NOTE — PROGRESS NOTES
Pt set off bed alarm. Aditi Pat at bedside, pt was out of bed standing at the foot of bed. Pt assisted back to bed 2 assist and pulled up in bed. Afternoon medications given at this time. Pt is alert, disoriented to time place and situation. Pt ate whole applesauce. Pt attempting to get out of bed while writer in room. Pt states that she hasn't ate all day. Per Aditi Pat pt ate entire lunch tray. Pt given 1 peanut butter cracker. Pt had some difficulty eating cracker as her mouth was dry. Pt asking for cup of coffee. Pt was given a cup of thickened water and drink approx 200 ML of water.

## 2022-10-15 NOTE — PROGRESS NOTES
1825 - Patient attempting to climb out of bed and agitated. PRN ativan administered as ordered. Patient alert to name only. Multiple attempts to continue to climb of of bed. Sitter continues for patient safety. Will continue to monitor. Patient denies needs at this time.

## 2022-10-15 NOTE — PROGRESS NOTES
1930 - Assessment completed. Vital sings obtained and documented. Patient very difficult to redirect. Continues to attempt to climb out of bed. Lungs clear t/o all fields. Abdomen soft; non tender. Arnold catheter draining medium yellow urine. Patient continues with hallucinations thinking she is in a restaurant and calling out for her father. Sodium level drawn and sent to lab per order. Patient denies needs at this time. Will continue to monitor.

## 2022-10-15 NOTE — PROGRESS NOTES
[facility]   Nephrology progress note  10/15/2022, 3:31 PM      Pt Name:    Wandy Dior  MRN:     [id number]     YOB: 1955  Admit Date:    10/11/2022  9:22 AM      TELEHEALTH EVALUATION -- Audio/Visual (During [facility]-31 public health emergency)     Telehealth service was provided with the patient at her room in [address] [place] [address] and myself the physician in my office in [address] [place] [address] and the patient's RN, Alice,  who has initiated the visit. Pursuant to the emergency declaration under the [address] [place] [address], [facility] waiver authority and the [employer] Act, this Virtual  Visit was conducted, with patient's consent, to reduce the patient's risk of exposure to COVID-19 and provide continuity of care for an established patient. Services were provided through a video synchronous discussion virtually to substitute for in-person clinic visit. Chief Complaint: Nephrology following for acute kidney injury and hypernatremia. Subjective:  Patient seen . Having a little more confusion today per RN. Sodium levels creeping back up.      Objective:  24HR INTAKE/OUTPUT:    Intake/Output Summary (Last 24 hours) at 10/15/2022 1531  Last data filed at 10/15/2022 1336  Gross per 24 hour   Intake 265 ml   Output 1300 ml   Net -1035 ml      Admission weight: 151 lb 6.4 oz (68.7 kg)  Wt Readings from Last 3 Encounters:   10/15/22 153 lb (69.4 kg)   04/22/21 180 lb (81.6 kg)        Vitals :   Vitals:    10/15/22 0315 10/15/22 0500 10/15/22 0815 10/15/22 1330   BP: 107/61  118/60 100/61   Pulse: 70  54 85   Resp: 18  18 18   Temp: 97.8 °F (36.6 °C)  96.9 °F (36.1 °C) 97.1 °F (36.2 °C)   TempSrc: Temporal  Temporal Temporal   SpO2: 97%  97% 97%   Weight:  153 lb (69.4 kg)     Height:         General -- no distress  Oral Mucosa -- moist  Neck --  JVD - no  Extremities -- no edema, moves all extremeties  CNS - awake and alert   Pschy - not agitated, mood and memory normal    Due to this being a TeleHealth encounter, evaluation of the following organ systems is limited: Vitals/EENT/Resp/CV/GI//MS/Neuro/Skin/Heme-Lymph-Imm. Medications:  Infusion:    dextrose      dextrose      sodium chloride       Meds:    ceFAZolin        nystatin  5 mL Oral 4x Daily    fluconazole  100 mg IntraVENous Q24H    haloperidol  5 mg Oral TID    ceFAZolin  2,000 mg IntraVENous Q8H    insulin lispro  0-8 Units SubCUTAneous TID WC    insulin lispro  0-4 Units SubCUTAneous Nightly    aspirin  81 mg Oral Daily    benztropine  1 mg Oral BID    divalproex  125 mg Oral TID    folic acid  1 mg Oral Daily    metoprolol tartrate  50 mg Oral BID    pantoprazole  40 mg Oral QAM AC    potassium chloride  10 mEq Oral Daily    pravastatin  40 mg Oral Daily    risperiDONE  2 mg Oral BID    vitamin B-1  100 mg Oral Daily    VORTIoxetine HBr  20 mg Oral Daily    sodium chloride flush  5-40 mL IntraVENous 2 times per day       Lab Data :  CBC:   Recent Labs     10/13/22  0553 10/14/22  0615   WBC 13.5* 9.1   HGB 14.2 13.8   HCT 46.1 43.8   PLT See Reflexed IPF Result See Reflexed IPF Result     CMP:  Recent Labs     10/13/22  1326 10/13/22  2212 10/14/22  0615 10/14/22  1033 10/14/22  1529 10/15/22  0635   *   < > 152* 143 148* 152*   K 4.0  --  3.8  --   --  4.3   *  --  115*  --   --  115*   CO2 26  --  27  --   --  27   BUN 29*  --  22  --   --  16   CREATININE 1.03*  --  1.14*  --   --  0.93*   GLUCOSE 252*  --  296*  --   --  162*   CALCIUM 8.8  --  8.3*  --   --  8.3*    < > = values in this interval not displayed.      Hepatic:   Recent Labs     10/13/22  0553 10/14/22  0615   LABALBU 3.5 3.2*   AST 21 18   ALT 39* 25   BILITOT 0.6 0.5   ALKPHOS 71 68       Assessment and Plan:  Renal -acute kidney injury most likely secondary to volume depletion  Significantly improving  Hypernatremia from dehydration, improved from admission, sodium at 152 will restart D5W @ 60 ml/hour, recheck sodium at 8 PM  Acid-base status appears to be stable  Hx of diabetes mellitus  Major depressive disorder with psychotic features  Essential hypertension stable  Meds reviewed and discussed with nursing staff     Andrew Giron DO  [facility]    This report has been created using voice recognition software.  It may contain minor errors which are inherent in voice recognition technology

## 2022-10-15 NOTE — PROGRESS NOTES
Pt. Is asleep at time of assessment. Pt. Phuc Darby are assessed at this time. Pt. Has call light in reach. Pt. Does not wake up so RN is unable to give pills.

## 2022-10-16 LAB
ABSOLUTE EOS #: 0.23 K/UL (ref 0–0.44)
ABSOLUTE IMMATURE GRANULOCYTE: 0.08 K/UL (ref 0–0.3)
ABSOLUTE LYMPH #: 1.5 K/UL (ref 1.1–3.7)
ABSOLUTE MONO #: 0.75 K/UL (ref 0.1–1.2)
ANION GAP SERPL CALCULATED.3IONS-SCNC: 9 MMOL/L (ref 9–17)
BASOPHILS # BLD: 1 % (ref 0–2)
BASOPHILS ABSOLUTE: 0.08 K/UL (ref 0–0.2)
BUN BLDV-MCNC: 16 MG/DL (ref 8–23)
BUN/CREAT BLD: 22 (ref 9–20)
CALCIUM SERPL-MCNC: 8.7 MG/DL (ref 8.6–10.4)
CHLORIDE BLD-SCNC: 110 MMOL/L (ref 98–107)
CO2: 28 MMOL/L (ref 20–31)
CREAT SERPL-MCNC: 0.74 MG/DL (ref 0.5–0.9)
EOSINOPHILS RELATIVE PERCENT: 3 % (ref 1–4)
GFR SERPL CREATININE-BSD FRML MDRD: >60 ML/MIN/1.73M2
GLUCOSE BLD-MCNC: 147 MG/DL (ref 74–100)
GLUCOSE BLD-MCNC: 168 MG/DL (ref 74–100)
GLUCOSE BLD-MCNC: 219 MG/DL (ref 70–99)
GLUCOSE BLD-MCNC: 221 MG/DL (ref 74–100)
GLUCOSE BLD-MCNC: 233 MG/DL (ref 74–100)
GLUCOSE BLD-MCNC: 259 MG/DL (ref 74–100)
HCT VFR BLD CALC: 38.2 % (ref 36.3–47.1)
HEMOGLOBIN: 12.9 G/DL (ref 11.9–15.1)
IMMATURE GRANULOCYTES: 1 %
LYMPHOCYTES # BLD: 20 % (ref 24–43)
MCH RBC QN AUTO: 30.5 PG (ref 25.2–33.5)
MCHC RBC AUTO-ENTMCNC: 33.8 G/DL (ref 28.4–34.8)
MCV RBC AUTO: 90.3 FL (ref 82.6–102.9)
MONOCYTES # BLD: 10 % (ref 3–12)
MORPHOLOGY: ABNORMAL
NRBC AUTOMATED: 0 PER 100 WBC
PDW BLD-RTO: 12.8 % (ref 11.8–14.4)
PLATELET # BLD: ABNORMAL K/UL (ref 138–453)
PLATELET, FLUORESCENCE: 93 K/UL (ref 138–453)
PLATELET, IMMATURE FRACTION: 2.2 % (ref 1.1–10.3)
POTASSIUM SERPL-SCNC: 3.9 MMOL/L (ref 3.7–5.3)
RBC # BLD: 4.23 M/UL (ref 3.95–5.11)
SEG NEUTROPHILS: 65 % (ref 36–65)
SEGMENTED NEUTROPHILS ABSOLUTE COUNT: 4.86 K/UL (ref 1.5–8.1)
SODIUM BLD-SCNC: 144 MMOL/L (ref 135–144)
SODIUM BLD-SCNC: 147 MMOL/L (ref 135–144)
WBC # BLD: 7.5 K/UL (ref 3.5–11.3)

## 2022-10-16 PROCEDURE — 80048 BASIC METABOLIC PNL TOTAL CA: CPT

## 2022-10-16 PROCEDURE — 85055 RETICULATED PLATELET ASSAY: CPT

## 2022-10-16 PROCEDURE — 6360000002 HC RX W HCPCS: Performed by: NURSE PRACTITIONER

## 2022-10-16 PROCEDURE — 6370000000 HC RX 637 (ALT 250 FOR IP): Performed by: FAMILY MEDICINE

## 2022-10-16 PROCEDURE — 99232 SBSQ HOSP IP/OBS MODERATE 35: CPT | Performed by: INTERNAL MEDICINE

## 2022-10-16 PROCEDURE — 1200000000 HC SEMI PRIVATE

## 2022-10-16 PROCEDURE — 85025 COMPLETE CBC W/AUTO DIFF WBC: CPT

## 2022-10-16 PROCEDURE — 84295 ASSAY OF SERUM SODIUM: CPT

## 2022-10-16 PROCEDURE — 6370000000 HC RX 637 (ALT 250 FOR IP): Performed by: NURSE PRACTITIONER

## 2022-10-16 PROCEDURE — 94761 N-INVAS EAR/PLS OXIMETRY MLT: CPT

## 2022-10-16 PROCEDURE — 36415 COLL VENOUS BLD VENIPUNCTURE: CPT

## 2022-10-16 RX ORDER — SENNA PLUS 8.6 MG/1
1 TABLET ORAL NIGHTLY
Status: DISCONTINUED | OUTPATIENT
Start: 2022-10-16 | End: 2022-10-28 | Stop reason: HOSPADM

## 2022-10-16 RX ORDER — DOCUSATE SODIUM 100 MG/1
100 CAPSULE, LIQUID FILLED ORAL 2 TIMES DAILY
Status: DISCONTINUED | OUTPATIENT
Start: 2022-10-16 | End: 2022-10-28 | Stop reason: HOSPADM

## 2022-10-16 RX ORDER — CHLORHEXIDINE GLUCONATE 0.12 MG/ML
15 RINSE ORAL 2 TIMES DAILY
Status: DISCONTINUED | OUTPATIENT
Start: 2022-10-16 | End: 2022-10-28 | Stop reason: HOSPADM

## 2022-10-16 RX ADMIN — DOCUSATE SODIUM 100 MG: 100 CAPSULE, LIQUID FILLED ORAL at 21:17

## 2022-10-16 RX ADMIN — METOPROLOL TARTRATE 50 MG: 50 TABLET, FILM COATED ORAL at 07:17

## 2022-10-16 RX ADMIN — POTASSIUM CHLORIDE 10 MEQ: 10 TABLET, EXTENDED RELEASE ORAL at 07:17

## 2022-10-16 RX ADMIN — DIVALPROEX SODIUM 125 MG: 125 CAPSULE ORAL at 13:56

## 2022-10-16 RX ADMIN — NYSTATIN 500000 UNITS: 100000 SUSPENSION ORAL at 17:08

## 2022-10-16 RX ADMIN — HALOPERIDOL 5 MG: 5 TABLET ORAL at 07:17

## 2022-10-16 RX ADMIN — CHLORHEXIDINE GLUCONATE 0.12% ORAL RINSE 15 ML: 1.2 LIQUID ORAL at 14:01

## 2022-10-16 RX ADMIN — PRAVASTATIN SODIUM 40 MG: 20 TABLET ORAL at 07:18

## 2022-10-16 RX ADMIN — RISPERIDONE 2 MG: 1 TABLET, FILM COATED ORAL at 07:17

## 2022-10-16 RX ADMIN — BENZTROPINE MESYLATE 1 MG: 1 TABLET ORAL at 07:18

## 2022-10-16 RX ADMIN — HALOPERIDOL 5 MG: 5 TABLET ORAL at 13:56

## 2022-10-16 RX ADMIN — FLUCONAZOLE 100 MG: 2 INJECTION, SOLUTION INTRAVENOUS at 11:34

## 2022-10-16 RX ADMIN — MAGNESIUM HYDROXIDE 30 ML: 400 SUSPENSION ORAL at 13:59

## 2022-10-16 RX ADMIN — NYSTATIN 500000 UNITS: 100000 SUSPENSION ORAL at 21:18

## 2022-10-16 RX ADMIN — CHLORHEXIDINE GLUCONATE 0.12% ORAL RINSE 15 ML: 1.2 LIQUID ORAL at 21:18

## 2022-10-16 RX ADMIN — DIVALPROEX SODIUM 125 MG: 125 CAPSULE ORAL at 07:18

## 2022-10-16 RX ADMIN — FOLIC ACID 1 MG: 1 TABLET ORAL at 07:18

## 2022-10-16 RX ADMIN — RISPERIDONE 2 MG: 1 TABLET, FILM COATED ORAL at 21:18

## 2022-10-16 RX ADMIN — BISACODYL 10 MG: 5 TABLET, COATED ORAL at 13:56

## 2022-10-16 RX ADMIN — THIAMINE HCL TAB 100 MG 100 MG: 100 TAB at 07:18

## 2022-10-16 RX ADMIN — ASPIRIN 81 MG: 81 TABLET, COATED ORAL at 07:17

## 2022-10-16 RX ADMIN — CEFAZOLIN 2000 MG: 2 INJECTION, POWDER, FOR SOLUTION INTRAMUSCULAR; INTRAVENOUS at 19:57

## 2022-10-16 RX ADMIN — CEFAZOLIN 2000 MG: 2 INJECTION, POWDER, FOR SOLUTION INTRAMUSCULAR; INTRAVENOUS at 04:39

## 2022-10-16 RX ADMIN — CEFAZOLIN 2000 MG: 2 INJECTION, POWDER, FOR SOLUTION INTRAMUSCULAR; INTRAVENOUS at 14:08

## 2022-10-16 RX ADMIN — INSULIN LISPRO 2 UNITS: 100 INJECTION, SOLUTION INTRAVENOUS; SUBCUTANEOUS at 17:01

## 2022-10-16 RX ADMIN — HALOPERIDOL 5 MG: 5 TABLET ORAL at 21:18

## 2022-10-16 RX ADMIN — BENZTROPINE MESYLATE 1 MG: 1 TABLET ORAL at 21:17

## 2022-10-16 RX ADMIN — DOCUSATE SODIUM 100 MG: 100 CAPSULE, LIQUID FILLED ORAL at 13:56

## 2022-10-16 RX ADMIN — PANTOPRAZOLE SODIUM 40 MG: 40 TABLET, DELAYED RELEASE ORAL at 07:17

## 2022-10-16 RX ADMIN — METOPROLOL TARTRATE 50 MG: 50 TABLET, FILM COATED ORAL at 21:17

## 2022-10-16 RX ADMIN — SENNOSIDES 8.6 MG: 8.6 TABLET, FILM COATED ORAL at 21:17

## 2022-10-16 RX ADMIN — LORAZEPAM 1 MG: 1 TABLET ORAL at 16:06

## 2022-10-16 RX ADMIN — DIVALPROEX SODIUM 125 MG: 125 CAPSULE ORAL at 21:17

## 2022-10-16 RX ADMIN — LORAZEPAM 1 MG: 1 TABLET ORAL at 23:21

## 2022-10-16 ASSESSMENT — PAIN SCALES - PAIN ASSESSMENT IN ADVANCED DEMENTIA (PAINAD)
NEGVOCALIZATION: 0
BODYLANGUAGE: 0
FACIALEXPRESSION: 0
NEGVOCALIZATION: 0
NEGVOCALIZATION: 0
BODYLANGUAGE: 0
FACIALEXPRESSION: 0
FACIALEXPRESSION: 0
NEGVOCALIZATION: 0
TOTALSCORE: 0
BREATHING: 0
BODYLANGUAGE: 0
BREATHING: 0
TOTALSCORE: 0
TOTALSCORE: 0
CONSOLABILITY: 0
TOTALSCORE: 0
BODYLANGUAGE: 0
TOTALSCORE: 0
BREATHING: 0
FACIALEXPRESSION: 0
BODYLANGUAGE: 0
TOTALSCORE: 0
BREATHING: 0
BREATHING: 0
TOTALSCORE: 0
CONSOLABILITY: 0
BREATHING: 0
CONSOLABILITY: 0
NEGVOCALIZATION: 0
BODYLANGUAGE: 0
BREATHING: 0
BODYLANGUAGE: 0
CONSOLABILITY: 0
BREATHING: 0
BREATHING: 0
CONSOLABILITY: 0
BODYLANGUAGE: 0
FACIALEXPRESSION: 0
BODYLANGUAGE: 0
TOTALSCORE: 0
BODYLANGUAGE: 0
NEGVOCALIZATION: 0
TOTALSCORE: 0
TOTALSCORE: 0
BODYLANGUAGE: 0
FACIALEXPRESSION: 0
NEGVOCALIZATION: 0
NEGVOCALIZATION: 0
CONSOLABILITY: 0
CONSOLABILITY: 0
FACIALEXPRESSION: 0
BREATHING: 0
CONSOLABILITY: 0
CONSOLABILITY: 0
TOTALSCORE: 0
CONSOLABILITY: 0
FACIALEXPRESSION: 0
CONSOLABILITY: 0
NEGVOCALIZATION: 0
NEGVOCALIZATION: 0
FACIALEXPRESSION: 0
FACIALEXPRESSION: 0
NEGVOCALIZATION: 0
NEGVOCALIZATION: 0
BREATHING: 0
BODYLANGUAGE: 0
TOTALSCORE: 0
BREATHING: 0
CONSOLABILITY: 0

## 2022-10-16 NOTE — PROGRESS NOTES
[facility]   Nephrology progress note  10/16/2022, 1:01 PM      Pt Name:    Yunier Martin  MRN:     [id number]     YOB: 1955  Admit Date:    10/11/2022  9:22 AM      TELEHEALTH EVALUATION -- Audio/Visual (During [facility] public health emergency)     Telehealth service was provided with the patient at her room in [address] [place] [address] and myself the physician in my office in [address] [place] [address] and the patient's RN, Pallavi Arriaza,  who has initiated the visit. Pursuant to the emergency declaration under the [address] [place] [address], [facility] waiver authority and the [employer] Act, this Virtual  Visit was conducted, with patient's consent, to reduce the patient's risk of exposure to COVID-19 and provide continuity of care for an established patient. Services were provided through a video synchronous discussion virtually to substitute for in-person clinic visit. Chief Complaint: Nephrology following for acute kidney injury and hypernatremia. Subjective:  Patient seen . She is sleeping, per RN has been sleeping and/or agitated throughout the day. Not eating as well today. Has D5W running at 30 ml/hour.     Objective:  24HR INTAKE/OUTPUT:    Intake/Output Summary (Last 24 hours) at 10/16/2022 1301  Last data filed at 10/16/2022 0500  Gross per 24 hour   Intake 1031.68 ml   Output 1700 ml   Net -668.32 ml      Admission weight: 151 lb 6.4 oz (68.7 kg)  Wt Readings from Last 3 Encounters:   10/16/22 160 lb (72.6 kg)   04/22/21 180 lb (81.6 kg)        Vitals :   Vitals:    10/16/22 0441 10/16/22 0500 10/16/22 0600 10/16/22 0715   BP:    128/61   Pulse:  59 56 58   Resp:    18   Temp:    97.1 °F (36.2 °C)   TempSrc:    Temporal   SpO2:    98%   Weight: 160 lb (72.6 kg)      Height:         General -- no distress  Oral Mucosa -- moist  Neck --  JVD - no  Extremities -- no edema, moves all extremeties  CNS - awake and alert Pschy - not agitated, mood and memory normal    Due to this being a TeleHealth encounter, evaluation of the following organ systems is limited: Vitals/EENT/Resp/CV/GI//MS/Neuro/Skin/Heme-Lymph-Imm. Medications:  Infusion:    dextrose 30 mL/hr at 10/15/22 2019    dextrose      sodium chloride       Meds:    bisacodyl  10 mg Oral Once    magnesium hydroxide  30 mL Oral Once    docusate sodium  100 mg Oral BID    senna  1 tablet Oral Nightly    chlorhexidine  15 mL Mouth/Throat BID    nystatin  5 mL Oral 4x Daily    haloperidol  5 mg Oral TID    ceFAZolin  2,000 mg IntraVENous Q8H    insulin lispro  0-8 Units SubCUTAneous TID WC    insulin lispro  0-4 Units SubCUTAneous Nightly    aspirin  81 mg Oral Daily    benztropine  1 mg Oral BID    divalproex  125 mg Oral TID    folic acid  1 mg Oral Daily    metoprolol tartrate  50 mg Oral BID    pantoprazole  40 mg Oral QAM AC    potassium chloride  10 mEq Oral Daily    pravastatin  40 mg Oral Daily    risperiDONE  2 mg Oral BID    vitamin B-1  100 mg Oral Daily    VORTIoxetine HBr  20 mg Oral Daily    sodium chloride flush  5-40 mL IntraVENous 2 times per day       Lab Data :  CBC:   Recent Labs     10/14/22  0615 10/16/22  0830   WBC 9.1 7.5   HGB 13.8 12.9   HCT 43.8 38.2   PLT See Reflexed IPF Result See Reflexed IPF Result     CMP:  Recent Labs     10/13/22  1326 10/13/22  2212 10/14/22  0615 10/14/22  1033 10/14/22  1529 10/15/22  0635 10/15/22  1930   *   < > 152*   < > 148* 152* 147*   K 4.0  --  3.8  --   --  4.3  --    *  --  115*  --   --  115*  --    CO2 26  --  27  --   --  27  --    BUN 29*  --  22  --   --  16  --    CREATININE 1.03*  --  1.14*  --   --  0.93*  --    GLUCOSE 252*  --  296*  --   --  162*  --    CALCIUM 8.8  --  8.3*  --   --  8.3*  --     < > = values in this interval not displayed.      Hepatic:   Recent Labs     10/14/22  0615   LABALBU 3.2*   AST 18   ALT 25   BILITOT 0.5   ALKPHOS 68       Assessment and Plan:  Renal -acute kidney injury most likely secondary to volume depletion  Significantly improved  Hypernatremia from dehydration, improved from admission. Labs have not been drawn yet today, RN calling lab to come draw them. Last Na was 147 last night, has D5 running @ 30 will adjust dependent on next sodium level  Acid-base status appears to be stable  Hx of diabetes mellitus  Major depressive disorder with psychotic features  Essential hypertension stable  Meds reviewed and discussed with nursing staff     Kadeem Martínez DO  [facility]    This report has been created using voice recognition software.  It may contain minor errors which are inherent in voice recognition technology

## 2022-10-16 NOTE — PROGRESS NOTES
Visitor was Present for Pt. At this time. Visitor asked RN why Pt. Ring was missing. RN asked What ring? Visitor stated that pt. Had a square ring that was ridiculously expensive and it was no longer on her finger. RN asked visitor if [facility] had it and visitor states \"I don't know why they would\". RN called ED to see if ring was located there since pt. Come in through ED. ED did not have ring at this time. RN contacted [facility] states the wring is locked up at their facility at this time. Visitor made aware that [facility] had the ring.

## 2022-10-16 NOTE — PROGRESS NOTES
2153 - Patient resting quietly with eyes closed at present time. Patient took nightime medications without difficulty. ; no coverage per order.

## 2022-10-16 NOTE — PROGRESS NOTES
[facility]  Inpatient/Observation/Outpatient Rehabilitation    Date: 10/16/2022  Patient Name: Rani Fenton       [] Inpatient Acute/Observation       []  Outpatient  : 1955       [] Pt no showed for scheduled appointment    [] Pt refused/declined therapy at this time due to:           [x] Pt cancelled due to:  [] No Reason Given   [] Sick/ill   [] Other:    Pt not seen this date d/t hold per RN this date. Therapist/Assistant will attempt to see this patient, at our earliest opportunity.        TAVO De Santiago/SWETHA Date: 10/16/2022

## 2022-10-16 NOTE — PROGRESS NOTES
[facility]  Inpatient/Observation/Outpatient Rehabilitation    Date: 10/16/2022  Patient Name: Harsh Denise       [] Inpatient Acute/Observation       []  Outpatient  : 1955       [] Pt no showed for scheduled appointment    [] Pt refused/declined therapy at this time due to:           [x] Pt cancelled due to:  [] No Reason Given   [] Sick/ill   [x] Other: Nurse states to hold therapy this date. Therapist/Assistant will attempt to see this patient, at our earliest opportunity.        Kelsi Emanuel, PTA Date: 10/16/2022

## 2022-10-16 NOTE — PROGRESS NOTES
0200 - Reassessment completed. Vital signs obtained and documented. Lungs clear t/o all fields. Abdomen soft; non tender. Patient sleeps off and on t/o shift. Remains anxious and attempting to throw legs over the side of the bed to get up when awake. Patient picks at gown and telemetry pads; redirected and will repeat multiple times. Patient denies needs at this time. Call light within reach. Will continue to monitor. Arnold draining medium yellow urine. PO fluids offered and given; patient tolerating well.

## 2022-10-16 NOTE — PROGRESS NOTES
Progress Note    SUBJECTIVE:    Patient seen for f/u of Hypernatremia. She resting in bed stated she is feeling better. Follows commands at this time      ROS: Unable due to altered mental status     All other systems were reviewed with the patient and are negative unless otherwise stated in HPI      OBJECTIVE:      Vitals:   Vitals:    10/16/22 0715   BP: 128/61   Pulse: 58   Resp: 18   Temp: 97.1 °F (36.2 °C)   SpO2: 98%     Weight: 160 lb (72.6 kg)   Height: 5' 6\" (167.6 cm)     Weight  Wt Readings from Last 3 Encounters:   10/16/22 160 lb (72.6 kg)   04/22/21 180 lb (81.6 kg)     Body mass index is 25.82 kg/m². 24HR INTAKE/OUTPUT:      Intake/Output Summary (Last 24 hours) at 10/16/2022 1431  Last data filed at 10/16/2022 0500  Gross per 24 hour   Intake 1271.68 ml   Output 1700 ml   Net -428.32 ml     -----------------------------------------------------------------  Exam:    GEN:    Awake, confused and agitated  EYES:   EOMI, pupils equal   NECK: Supple. No lymphadenopathy. No carotid bruit  FACE/MOUTH:  left parotitis improving oral thrush improved but mucous membranes less dry  CVS:     regular rate and rhythm, no audible murmur  PULM:  CTA, no wheezes, rales or rhonchi, no acute respiratory distress  ABD:     Bowels sounds normal.  Abdomen is soft. No distention. no tenderness to palpation. EXT:     no edema bilaterally . No calf tenderness. NEURO: Moves all extremities. Motor and sensory are grossly intact. Visible tremors   SKIN:    No rashes.   No skin lesions    -----------------------------------------------------------------    Diagnostic Data:      Complete Blood Count:   Recent Labs     10/14/22  0615   WBC 9.1   RBC 4.63   HGB 13.8   HCT 43.8   MCV 94.6   MCH 29.8   MCHC 31.5   RDW 13.4   PLT See Reflexed IPF Result        Last 3 Blood Glucose:   Recent Labs     10/13/22  1326 10/14/22  0615 10/15/22  0635   GLUCOSE 252* 296* 162*        Comprehensive Metabolic Profile:   Recent Labs 10/13/22  1326 10/13/22  2212 10/14/22  0615 10/14/22  1033 10/14/22  1529 10/15/22  0635 10/15/22  1930   *   < > 152*   < > 148* 152* 147*   K 4.0  --  3.8  --   --  4.3  --    *  --  115*  --   --  115*  --    CO2 26  --  27  --   --  27  --    BUN 29*  --  22  --   --  16  --    CREATININE 1.03*  --  1.14*  --   --  0.93*  --    GLUCOSE 252*  --  296*  --   --  162*  --    CALCIUM 8.8  --  8.3*  --   --  8.3*  --    PROT  --   --  6.5  --   --   --   --    LABALBU  --   --  3.2*  --   --   --   --    BILITOT  --   --  0.5  --   --   --   --    ALKPHOS  --   --  68  --   --   --   --    AST  --   --  18  --   --   --   --    ALT  --   --  25  --   --   --   --     < > = values in this interval not displayed. Urinalysis:   Lab Results   Component Value Date/Time    NITRU NEGATIVE 10/11/2022 07:00 PM    COLORU Yellow 10/11/2022 07:00 PM    PHUR 6.0 10/11/2022 07:00 PM    WBCUA 2 TO 5 10/11/2022 07:00 PM    RBCUA 2 TO 5 10/11/2022 07:00 PM    MUCUS TRACE 10/11/2022 07:00 PM    BACTERIA 1+ 10/11/2022 07:00 PM    SPECGRAV 1.020 10/11/2022 07:00 PM    LEUKOCYTESUR NEGATIVE 10/11/2022 07:00 PM    UROBILINOGEN ELEVATED 10/11/2022 07:00 PM    BILIRUBINUR NEGATIVE 10/11/2022 07:00 PM    GLUCOSEU 3+ 10/11/2022 07:00 PM    KETUA NEGATIVE 10/11/2022 07:00 PM       HgBA1c:    Lab Results   Component Value Date/Time    LABA1C 6.8 10/11/2022 09:35 AM       Lactic Acid: No results found for: LACTA     Troponin: No results for input(s): TROPONINI in the last 72 hours. CRP:  No results for input(s): CRP in the last 72 hours. Radiology/Imaging:  XR CHEST PORTABLE   Final Result   No acute cardiopulmonary process         CT Head W/O Contrast   Final Result   No acute intracranial abnormality. Prominent senescent changes. These appear more prominent than usually seen   in a patient of this age, but could be due to old injuries, various   medications, or history of substance abuse. ASSESSMENT / PLAN:  Hypernatremia  Appreciate Nephrology  IVF  Stop normal saline  Trend labs-improving  Dehydration  IVF  Trend labs-improving  Possibly due to oversedation, decreased oral intake, psychotropic medications including other medications such as lactulose and Jardiance  Type 2 diabetes  POCT before meals and at bedtime  Insulin sliding scale  Hypoglycemia protocol  Hold Jardiance  Major depressive disorder with psychotic features  Stop Geodon   Continue Cogentin, Depakote, Risperdal, Trintellix, Ativan  Continue Oral Haldol  Hold Abilify  Bacteremia  Stop Levaquin  Continue  Ancef  BC - Staph Aureus - awaiting denitrification and sensitivity  Thrush  IV Diflucan  Add Peridox  Constipation  Start Colace, Senokot  MOM x 1 today  Dulcolax 10 mg x 1 today  Essential hypertension  Continue Lopressor  Nutrition status:   at risk for malnutrition  Dietician consult initiated  Hospital Prophylaxis:   DVT: Lovenox   Stress Ulcer: H2 Blocker   High risk medications: none   Disposition:    Discharge plan is - Return to [facility] when medically cleared      ROB Ayala - CNP , ROB, NP-C  Hospitalist Medicine        10/16/2022, 8:12 AM

## 2022-10-16 NOTE — PLAN OF CARE
Problem: Discharge Planning  Goal: Discharge to home or other facility with appropriate resources  Outcome: Progressing  Flowsheets (Taken 10/15/2022 1930)  Discharge to home or other facility with appropriate resources:   Identify barriers to discharge with patient and caregiver   Arrange for needed discharge resources and transportation as appropriate   Identify discharge learning needs (meds, wound care, etc)   Refer to discharge planning if patient needs post-hospital services based on physician order or complex needs related to functional status, cognitive ability or social support system     Problem: ABCDS Injury Assessment  Goal: Absence of physical injury  Outcome: Progressing     Problem: Skin/Tissue Integrity  Goal: Absence of new skin breakdown  Description: 1. Monitor for areas of redness and/or skin breakdown  2. Assess vascular access sites hourly  3. Every 4-6 hours minimum:  Change oxygen saturation probe site  4. Every 4-6 hours:  If on nasal continuous positive airway pressure, respiratory therapy assess nares and determine need for appliance change or resting period.   Outcome: Progressing     Problem: Safety - Adult  Goal: Free from fall injury  Outcome: Progressing     Problem: Nutrition Deficit:  Goal: Optimize nutritional status  Outcome: Progressing     Problem: Pain  Goal: Verbalizes/displays adequate comfort level or baseline comfort level  Outcome: Progressing  Flowsheets (Taken 10/15/2022 1930)  Verbalizes/displays adequate comfort level or baseline comfort level:   Administer analgesics based on type and severity of pain and evaluate response   Implement non-pharmacological measures as appropriate and evaluate response   Consider cultural and social influences on pain and pain management     Problem: Metabolic/Fluid and Electrolytes - Adult  Goal: Electrolytes maintained within normal limits  Outcome: Progressing  Flowsheets (Taken 10/15/2022 1930)  Electrolytes maintained within normal limits:   Monitor labs and assess patient for signs and symptoms of electrolyte imbalances   Administer electrolyte replacement as ordered   Monitor response to electrolyte replacements, including repeat lab results as appropriate     Problem: Metabolic/Fluid and Electrolytes - Adult  Goal: Glucose maintained within prescribed range  Outcome: Progressing  Flowsheets (Taken 10/15/2022 1930)  Glucose maintained within prescribed range:   Monitor blood glucose as ordered   Assess for signs and symptoms of hyperglycemia and hypoglycemia   Administer ordered medications to maintain glucose within target range

## 2022-10-17 PROBLEM — F32.9 MAJOR DEPRESSIVE DISORDER: Status: ACTIVE | Noted: 2022-10-17

## 2022-10-17 PROBLEM — K70.31 ALCOHOLIC CIRRHOSIS OF LIVER WITH ASCITES (HCC): Status: ACTIVE | Noted: 2022-10-17

## 2022-10-17 PROBLEM — A41.01 MSSA (METHICILLIN SUSCEPTIBLE STAPHYLOCOCCUS AUREUS) SEPTICEMIA (HCC): Status: ACTIVE | Noted: 2022-10-17

## 2022-10-17 PROBLEM — K11.21 PAROTITIS, ACUTE: Status: ACTIVE | Noted: 2022-10-17

## 2022-10-17 PROBLEM — N17.9 AKI (ACUTE KIDNEY INJURY) (HCC): Status: ACTIVE | Noted: 2022-10-17

## 2022-10-17 LAB
ABSOLUTE EOS #: 0.25 K/UL (ref 0–0.44)
ABSOLUTE IMMATURE GRANULOCYTE: 0.08 K/UL (ref 0–0.3)
ABSOLUTE LYMPH #: 1.83 K/UL (ref 1.1–3.7)
ABSOLUTE MONO #: 0.83 K/UL (ref 0.1–1.2)
ANION GAP SERPL CALCULATED.3IONS-SCNC: 10 MMOL/L (ref 9–17)
BASOPHILS # BLD: 0 % (ref 0–2)
BASOPHILS ABSOLUTE: 0 K/UL (ref 0–0.2)
BUN BLDV-MCNC: 12 MG/DL (ref 8–23)
BUN/CREAT BLD: 21 (ref 9–20)
CALCIUM SERPL-MCNC: 8.4 MG/DL (ref 8.6–10.4)
CHLORIDE BLD-SCNC: 110 MMOL/L (ref 98–107)
CO2: 25 MMOL/L (ref 20–31)
CREAT SERPL-MCNC: 0.58 MG/DL (ref 0.5–0.9)
EOSINOPHILS RELATIVE PERCENT: 3 % (ref 1–4)
GFR SERPL CREATININE-BSD FRML MDRD: >60 ML/MIN/1.73M2
GLUCOSE BLD-MCNC: 158 MG/DL (ref 74–100)
GLUCOSE BLD-MCNC: 183 MG/DL (ref 70–99)
GLUCOSE BLD-MCNC: 212 MG/DL (ref 74–100)
GLUCOSE BLD-MCNC: 251 MG/DL (ref 74–100)
GLUCOSE BLD-MCNC: 277 MG/DL (ref 74–100)
HCT VFR BLD CALC: 37.9 % (ref 36.3–47.1)
HEMOGLOBIN: 12.4 G/DL (ref 11.9–15.1)
IMMATURE GRANULOCYTES: 1 %
LYMPHOCYTES # BLD: 22 % (ref 24–43)
MCH RBC QN AUTO: 29.3 PG (ref 25.2–33.5)
MCHC RBC AUTO-ENTMCNC: 32.7 G/DL (ref 28.4–34.8)
MCV RBC AUTO: 89.6 FL (ref 82.6–102.9)
MONOCYTES # BLD: 10 % (ref 3–12)
MORPHOLOGY: NORMAL
NRBC AUTOMATED: 0 PER 100 WBC
PDW BLD-RTO: 12.6 % (ref 11.8–14.4)
PLATELET # BLD: ABNORMAL K/UL (ref 138–453)
PLATELET, FLUORESCENCE: 80 K/UL (ref 138–453)
PLATELET, IMMATURE FRACTION: 5.4 % (ref 1.1–10.3)
POTASSIUM SERPL-SCNC: 4.1 MMOL/L (ref 3.7–5.3)
RBC # BLD: 4.23 M/UL (ref 3.95–5.11)
SEG NEUTROPHILS: 64 % (ref 36–65)
SEGMENTED NEUTROPHILS ABSOLUTE COUNT: 5.31 K/UL (ref 1.5–8.1)
SODIUM BLD-SCNC: 143 MMOL/L (ref 135–144)
SODIUM BLD-SCNC: 145 MMOL/L (ref 135–144)
WBC # BLD: 8.3 K/UL (ref 3.5–11.3)

## 2022-10-17 PROCEDURE — 1200000000 HC SEMI PRIVATE

## 2022-10-17 PROCEDURE — 2580000003 HC RX 258: Performed by: INTERNAL MEDICINE

## 2022-10-17 PROCEDURE — 6360000002 HC RX W HCPCS: Performed by: NURSE PRACTITIONER

## 2022-10-17 PROCEDURE — 82947 ASSAY GLUCOSE BLOOD QUANT: CPT

## 2022-10-17 PROCEDURE — 97535 SELF CARE MNGMENT TRAINING: CPT

## 2022-10-17 PROCEDURE — 99222 1ST HOSP IP/OBS MODERATE 55: CPT | Performed by: INTERNAL MEDICINE

## 2022-10-17 PROCEDURE — 85025 COMPLETE CBC W/AUTO DIFF WBC: CPT

## 2022-10-17 PROCEDURE — 80048 BASIC METABOLIC PNL TOTAL CA: CPT

## 2022-10-17 PROCEDURE — 6370000000 HC RX 637 (ALT 250 FOR IP): Performed by: FAMILY MEDICINE

## 2022-10-17 PROCEDURE — 97530 THERAPEUTIC ACTIVITIES: CPT

## 2022-10-17 PROCEDURE — 36415 COLL VENOUS BLD VENIPUNCTURE: CPT

## 2022-10-17 PROCEDURE — 84295 ASSAY OF SERUM SODIUM: CPT

## 2022-10-17 PROCEDURE — 85055 RETICULATED PLATELET ASSAY: CPT

## 2022-10-17 PROCEDURE — 94761 N-INVAS EAR/PLS OXIMETRY MLT: CPT

## 2022-10-17 PROCEDURE — 99232 SBSQ HOSP IP/OBS MODERATE 35: CPT | Performed by: INTERNAL MEDICINE

## 2022-10-17 PROCEDURE — 6370000000 HC RX 637 (ALT 250 FOR IP): Performed by: NURSE PRACTITIONER

## 2022-10-17 RX ADMIN — HALOPERIDOL 5 MG: 5 TABLET ORAL at 09:32

## 2022-10-17 RX ADMIN — HALOPERIDOL 5 MG: 5 TABLET ORAL at 21:16

## 2022-10-17 RX ADMIN — METOPROLOL TARTRATE 50 MG: 50 TABLET, FILM COATED ORAL at 21:16

## 2022-10-17 RX ADMIN — LORAZEPAM 1 MG: 1 TABLET ORAL at 10:57

## 2022-10-17 RX ADMIN — DEXTROSE MONOHYDRATE: 50 INJECTION, SOLUTION INTRAVENOUS at 14:05

## 2022-10-17 RX ADMIN — CEFAZOLIN 2000 MG: 2 INJECTION, POWDER, FOR SOLUTION INTRAMUSCULAR; INTRAVENOUS at 11:46

## 2022-10-17 RX ADMIN — DEXTROSE MONOHYDRATE: 50 INJECTION, SOLUTION INTRAVENOUS at 11:44

## 2022-10-17 RX ADMIN — CHLORHEXIDINE GLUCONATE 0.12% ORAL RINSE 15 ML: 1.2 LIQUID ORAL at 09:32

## 2022-10-17 RX ADMIN — NYSTATIN 500000 UNITS: 100000 SUSPENSION ORAL at 12:45

## 2022-10-17 RX ADMIN — NYSTATIN 500000 UNITS: 100000 SUSPENSION ORAL at 09:40

## 2022-10-17 RX ADMIN — CHLORHEXIDINE GLUCONATE 0.12% ORAL RINSE 15 ML: 1.2 LIQUID ORAL at 21:14

## 2022-10-17 RX ADMIN — BENZTROPINE MESYLATE 1 MG: 1 TABLET ORAL at 21:16

## 2022-10-17 RX ADMIN — INSULIN LISPRO 4 UNITS: 100 INJECTION, SOLUTION INTRAVENOUS; SUBCUTANEOUS at 11:49

## 2022-10-17 RX ADMIN — THIAMINE HCL TAB 100 MG 100 MG: 100 TAB at 09:32

## 2022-10-17 RX ADMIN — LORAZEPAM 1 MG: 1 TABLET ORAL at 16:47

## 2022-10-17 RX ADMIN — DIVALPROEX SODIUM 125 MG: 125 CAPSULE ORAL at 09:30

## 2022-10-17 RX ADMIN — RISPERIDONE 2 MG: 1 TABLET, FILM COATED ORAL at 09:32

## 2022-10-17 RX ADMIN — INSULIN LISPRO 4 UNITS: 100 INJECTION, SOLUTION INTRAVENOUS; SUBCUTANEOUS at 16:19

## 2022-10-17 RX ADMIN — FOLIC ACID 1 MG: 1 TABLET ORAL at 09:32

## 2022-10-17 RX ADMIN — HALOPERIDOL 5 MG: 5 TABLET ORAL at 12:45

## 2022-10-17 RX ADMIN — RISPERIDONE 2 MG: 1 TABLET, FILM COATED ORAL at 21:16

## 2022-10-17 RX ADMIN — NYSTATIN 500000 UNITS: 100000 SUSPENSION ORAL at 15:40

## 2022-10-17 RX ADMIN — ASPIRIN 81 MG: 81 TABLET, COATED ORAL at 09:32

## 2022-10-17 RX ADMIN — DIVALPROEX SODIUM 125 MG: 125 CAPSULE ORAL at 21:17

## 2022-10-17 RX ADMIN — POTASSIUM CHLORIDE 10 MEQ: 10 TABLET, EXTENDED RELEASE ORAL at 09:32

## 2022-10-17 RX ADMIN — CEFAZOLIN 2000 MG: 2 INJECTION, POWDER, FOR SOLUTION INTRAMUSCULAR; INTRAVENOUS at 21:27

## 2022-10-17 RX ADMIN — DIVALPROEX SODIUM 125 MG: 125 CAPSULE ORAL at 15:40

## 2022-10-17 RX ADMIN — SENNOSIDES 8.6 MG: 8.6 TABLET, FILM COATED ORAL at 21:16

## 2022-10-17 RX ADMIN — BENZTROPINE MESYLATE 1 MG: 1 TABLET ORAL at 09:30

## 2022-10-17 RX ADMIN — DOCUSATE SODIUM 100 MG: 100 CAPSULE, LIQUID FILLED ORAL at 09:32

## 2022-10-17 RX ADMIN — CEFAZOLIN 2000 MG: 2 INJECTION, POWDER, FOR SOLUTION INTRAMUSCULAR; INTRAVENOUS at 04:40

## 2022-10-17 RX ADMIN — NYSTATIN 500000 UNITS: 100000 SUSPENSION ORAL at 21:14

## 2022-10-17 RX ADMIN — PRAVASTATIN SODIUM 40 MG: 20 TABLET ORAL at 09:32

## 2022-10-17 RX ADMIN — DOCUSATE SODIUM 100 MG: 100 CAPSULE, LIQUID FILLED ORAL at 21:16

## 2022-10-17 RX ADMIN — PANTOPRAZOLE SODIUM 40 MG: 40 TABLET, DELAYED RELEASE ORAL at 09:32

## 2022-10-17 NOTE — PROGRESS NOTES
Comprehensive Nutrition Assessment    Type and Reason for Visit:  Reassess    Nutrition Recommendations/Plan:   Encourage Two Bruno use  Prompt at meals     Malnutrition Assessment:  Malnutrition Status: At risk for malnutrition (Comment) (10/12/22 0337)    Context:  Acute Illness     Findings of the 6 clinical characteristics of malnutrition:  Energy Intake:  Mild decrease in energy intake (Comment) (at least acutely)  Weight Loss:  Unable to assess     Body Fat Loss:  No significant body fat loss     Muscle Mass Loss:  No significant muscle mass loss    Fluid Accumulation:  No significant fluid accumulation     Strength:  Not Performed    Nutrition Assessment:    Continued suboptimal nutrient intakes, improving somewhat. Weight improving but remains well off of 180# former values. Glycemia 147-277 mg/dl in last 24 hours, exacerbated by D5 infusion. Slow to respond, but also had ativan earlier this date. Na+ 145 mmol/L this morning, (144 yesterday evening). Nutrition Related Findings:    fair muscle tone, no edema. Wound Type: None       Current Nutrition Intake & Therapies:    Average Meal Intake: 26-50%  Average Supplements Intake: %  ADULT DIET; Dysphagia - Pureed; 4 carb choices (60 gm/meal); Mildly Thick (Nectar)  ADULT ORAL NUTRITION SUPPLEMENT; Breakfast, Lunch, Dinner; Other Oral Supplement; Two Bruno HN    Anthropometric Measures:  Height: 5' 6\" (167.6 cm)  Ideal Body Weight (IBW): 130 lbs (59 kg)    Admission Body Weight: 151 lb 6.4 oz (68.7 kg)  Current Body Weight: 160 lb 0.9 oz (72.6 kg), 116.5 % IBW. Weight Source: Bed Scale  Current BMI (kg/m2): 25.8  Usual Body Weight: 180 lb (81.6 kg)  % Weight Change (Calculated): -11.1  Weight Adjustment For: No Adjustment                 BMI Categories: Overweight (BMI 25.0-29. 9)    Estimated Daily Nutrient Needs:  Energy Requirements Based On: Kcal/kg  Weight Used for Energy Requirements: Current  Energy (kcal/day): 0647-2513 (20-25)  Weight Used for Protein Requirements: Current  Protein (g/day): 75-89 (1.1-1.3)  Method Used for Fluid Requirements: 1 ml/kcal  Fluid (ml/day): 1700+    Nutrition Diagnosis:   Predicted inadequate energy intake related to cognitive or neurological impairment as evidenced by other (comment) (dehydration)    Lab Results   Component Value Date     (H) 10/17/2022    K 4.1 10/17/2022     (H) 10/17/2022    CO2 25 10/17/2022    BUN 12 10/17/2022    CREATININE 0.58 10/17/2022    GLUCOSE 183 (H) 10/17/2022    CALCIUM 8.4 (L) 10/17/2022    PROT 6.5 10/14/2022    LABALBU 3.2 (L) 10/14/2022    BILITOT 0.5 10/14/2022    ALKPHOS 68 10/14/2022    AST 18 10/14/2022    ALT 25 10/14/2022    LABGLOM >60 10/17/2022     Nutrition Interventions:   Food and/or Nutrient Delivery: Continue Current Diet, Continue Oral Nutrition Supplement  Nutrition Education/Counseling: Education not appropriate  Coordination of Nutrition Care: Continue to monitor while inpatient  Plan of Care discussed with: nursing    Goals:  Previous Goal Met: Progressing toward Goal(s)  Goals: Meet at least 75% of estimated needs       Nutrition Monitoring and Evaluation:   Behavioral-Environmental Outcomes:  Other (Comment) (cognition)  Food/Nutrient Intake Outcomes: Supplement Intake, Food and Nutrient Intake  Physical Signs/Symptoms Outcomes: Chewing or Swallowing, Biochemical Data, Weight    Discharge Planning:    No discharge needs at this time     Jose Garcia, [address], [address]: [id number]

## 2022-10-17 NOTE — PROGRESS NOTES
Progress Note    SUBJECTIVE:    Patient seen for f/u of Hypernatremia. She resting in bed no distress. ROS: Unable due to altered mental status     All other systems were reviewed with the patient and are negative unless otherwise stated in HPI      OBJECTIVE:      Vitals:   Vitals:    10/17/22 0915   BP: 128/80   Pulse: 63   Resp: 16   Temp: 96.8 °F (36 °C)   SpO2: 99%     Weight: 160 lb 0.1 oz (72.6 kg)   Height: 5' 6\" (167.6 cm)     Weight  Wt Readings from Last 3 Encounters:   10/17/22 160 lb 0.1 oz (72.6 kg)   04/22/21 180 lb (81.6 kg)     Body mass index is 25.83 kg/m². 24HR INTAKE/OUTPUT:      Intake/Output Summary (Last 24 hours) at 10/17/2022 1133  Last data filed at 10/17/2022 1103  Gross per 24 hour   Intake 1826.36 ml   Output 1150 ml   Net 676.36 ml     -----------------------------------------------------------------  Exam:    GEN:    Awake, confused and agitated  EYES:   EOMI, pupils equal   NECK: Supple. No lymphadenopathy. No carotid bruit  FACE/MOUTH:  left parotitis improving oral thrush improved but mucous membranes less dry  CVS:     regular rate and rhythm, no audible murmur  PULM:  CTA, no wheezes, rales or rhonchi, no acute respiratory distress  ABD:     Bowels sounds normal.  Abdomen is soft. No distention. no tenderness to palpation. EXT:     no edema bilaterally . No calf tenderness. NEURO: Moves all extremities. Motor and sensory are grossly intact. Visible tremors   SKIN:    No rashes.   No skin lesions    -----------------------------------------------------------------    Diagnostic Data:      Complete Blood Count:   Recent Labs     10/16/22  0830 10/17/22  0615   WBC 7.5 8.3   RBC 4.23 4.23   HGB 12.9 12.4   HCT 38.2 37.9   MCV 90.3 89.6   MCH 30.5 29.3   MCHC 33.8 32.7   RDW 12.8 12.6   PLT See Reflexed IPF Result See Reflexed IPF Result        Last 3 Blood Glucose:   Recent Labs     10/15/22  0635 10/16/22  0830 10/17/22  0615   GLUCOSE 162* 219* 183* Comprehensive Metabolic Profile:   Recent Labs     10/15/22  0635 10/15/22  1930 10/16/22  0830 10/16/22  2106 10/17/22  0615   *   < > 147* 144 145*   K 4.3  --  3.9  --  4.1   *  --  110*  --  110*   CO2 27  --  28  --  25   BUN 16  --  16  --  12   CREATININE 0.93*  --  0.74  --  0.58   GLUCOSE 162*  --  219*  --  183*   CALCIUM 8.3*  --  8.7  --  8.4*    < > = values in this interval not displayed. Urinalysis:   Lab Results   Component Value Date/Time    NITRU NEGATIVE 10/11/2022 07:00 PM    COLORU Yellow 10/11/2022 07:00 PM    PHUR 6.0 10/11/2022 07:00 PM    WBCUA 2 TO 5 10/11/2022 07:00 PM    RBCUA 2 TO 5 10/11/2022 07:00 PM    MUCUS TRACE 10/11/2022 07:00 PM    BACTERIA 1+ 10/11/2022 07:00 PM    SPECGRAV 1.020 10/11/2022 07:00 PM    LEUKOCYTESUR NEGATIVE 10/11/2022 07:00 PM    UROBILINOGEN ELEVATED 10/11/2022 07:00 PM    BILIRUBINUR NEGATIVE 10/11/2022 07:00 PM    GLUCOSEU 3+ 10/11/2022 07:00 PM    KETUA NEGATIVE 10/11/2022 07:00 PM       HgBA1c:    Lab Results   Component Value Date/Time    LABA1C 6.8 10/11/2022 09:35 AM       Radiology/Imaging:  XR CHEST PORTABLE   Final Result   No acute cardiopulmonary process         CT Head W/O Contrast   Final Result   No acute intracranial abnormality. Prominent senescent changes. These appear more prominent than usually seen   in a patient of this age, but could be due to old injuries, various   medications, or history of substance abuse.                ASSESSMENT / PLAN:  Hypernatremia  Appreciate Nephrology  IVF  Stop normal saline  Trend labs-improving  Dehydration  IVF  Trend labs-improving  Possibly due to oversedation, decreased oral intake, psychotropic medications including other medications such as lactulose and Jardiance  Type 2 diabetes  POCT before meals and at bedtime  Insulin sliding scale  Hypoglycemia protocol  Hold Jardiance  Major depressive disorder with psychotic features  Stop Geodon   Continue Cogentin, Depakote, Risperdal, Trintellix, Ativan  Continue Oral Haldol  Hold Abilify  Bacteremia  Appreciate ID for ATB for DC   Stop Levaquin  Continue  Ancef  BC - Staph Aureus - awaiting denitrification and sensitivity  Thrush  IV Diflucan  Add Peridox  Constipation  Start Colace, Senokot  MOM x 1 today  Dulcolax 10 mg x 1 today  Essential hypertension  Continue Lopressor  Nutrition status:   at risk for malnutrition  Dietician consult initiated  Hospital Prophylaxis:   DVT: Lovenox   Stress Ulcer: H2 Blocker   High risk medications: none   Disposition:    Discharge plan is - Return to [address] [place] [address] when medically cleared      ROB Brewer - CNP , ROB, NP-C  Hospitalist Medicine        10/17/2022, 11:33 AM

## 2022-10-17 NOTE — PROGRESS NOTES
[address] [facility] rehab will not accept the patient back.   JUSTEN Bourgeois

## 2022-10-17 NOTE — PROGRESS NOTES
Physical Therapy  Facility/Department: formerly [facility] AT THE [facility] MED SURG  Daily Treatment Note  NAME: Ankush Ca  : 1955  MRN: [id number]    Date of Service: 10/17/2022    Discharge Recommendations:  [address]        Patient Diagnosis(es): The primary encounter diagnosis was Hypernatremia. Diagnoses of Dehydration and Altered mental status, unspecified altered mental status type were also pertinent to this visit. Assessment   Assessment: Pt was initially mildly lethargic, tolerated sitting EOB and standing activities with minimal rest breaks initially but then pt became progressively tired/lethargic and therefore was laid down at conclusion of treatment. Pt able to stand ~3 minutes, 2.5 minutes, ~1 minute 45 seconds, and then ~30 seconds with CGA-Nicanor HHA of 2. Continue to progress as tolerated. Activity Tolerance: Patient limited by fatigue;Treatment limited secondary to decreased cognition     Plan    Physcial Therapy Plan  General Plan: 2 times a day 7 days a week (1x per day on weekends.)     Restrictions  Restrictions/Precautions  Restrictions/Precautions: General Precautions, Fall Risk  Required Braces or Orthoses?: No     Subjective    Subjective  Subjective: Nurses permitted treatment, pt pleasant and agreeable to getting out of bed. Pain: denied  Orientation  Overall Orientation Status: Impaired     Objective   Vitals     Bed Mobility Training  Bed Mobility Training: Yes  Overall Level of Assistance: Minimum assistance;Assist X2  Interventions: Manual cues;Demonstration;Visual cues; Tactile cues  Supine to Sit: Minimum assistance;Assist X2  Sit to Supine: Minimum assistance;Assist X2  Balance  Sitting: Impaired (Pt completed seated static with 1-2 UE support at EOB. Pt required frequent cues for technique to optimize safety. Pt required anywhere from CGA to maxA for sitting EOB.  Continue to progress as tolerated.)  Sitting - Static: Poor (constant support)  Sitting - Dynamic: Poor (constant support)  Standing: Impaired  Standing - Static:  (Range from fair- to poor with 2 person HHA.)  Transfer Training  Transfer Training: Yes  Overall Level of Assistance: Minimum assistance;Assist X2;Contact-guard assistance  Interventions: Demonstration;Verbal cues; Tactile cues; Visual cues  Sit to Stand: Contact-guard assistance;Minimum assistance;Assist X2  Stand to Sit: Minimum assistance;Assist X2;Contact-guard assistance     PT Exercises  Exercise Treatment: Pt completed R lateral side steps x 6 with Nicanor of HHA x 2. Standing marches x 10, attempted calf raises x 6. Pt requiring anywhere from CGA to modA x 2 with standing activities this date. Pt completed a total of 4 sit <--> stand transfers this date. Safety Devices  Type of Devices: All fall risk precautions in place;Sitter present;Nurse notified;Call light within reach; Left in bed;Patient at risk for falls (No alarm present upon entering pt room, however patient is on 1:1 supervision (STNA present). )       Goals  Short Term Goals  Time Frame for Short Term Goals: 3 DAYS  Short Term Goal 1: MOD ASSIST BED MOBILITY  Short Term Goal 2: MIN ASSIST TRANSFERS. Long Term Goals  Time Frame for Long Term Goals : 4 WEEKS  Long Term Goal 1: MIN ASSIST TRANSFERS AND MIN ASSIST BED MOBILITY. Patient Goals   Patient Goals : UNABLE TO ASCERTAIN DUE TO IMPAIRED COGNITION AND LETHARGY. Education  Patient Education  Education Given To: Patient  Education Provided: Transfer Training  Education Provided Comments: Cues for technique with bed mobility, side stepping and transfers to optimize safety--poor+ to fair- correction noted, limited by cognition.   Education Method: Verbal;Demonstration  Barriers to Learning: Cognition  Education Outcome: Continued education needed    Therapy Time   Individual Concurrent Group Co-treatment   Time In 1107         Time Out [address] [place] [address] [place] [address]

## 2022-10-17 NOTE — PLAN OF CARE
Problem: Discharge Planning  Goal: Discharge to home or other facility with appropriate resources  Outcome: Progressing  Flowsheets (Taken 10/16/2022 2258)  Discharge to home or other facility with appropriate resources:   Identify barriers to discharge with patient and caregiver   Arrange for needed discharge resources and transportation as appropriate   Identify discharge learning needs (meds, wound care, etc)   Arrange for interpreters to assist at discharge as needed   Refer to discharge planning if patient needs post-hospital services based on physician order or complex needs related to functional status, cognitive ability or social support system     Problem: ABCDS Injury Assessment  Goal: Absence of physical injury  Outcome: Progressing  Flowsheets (Taken 10/16/2022 2258)  Absence of Physical Injury: Implement safety measures based on patient assessment     Problem: Skin/Tissue Integrity  Goal: Absence of new skin breakdown  Description: 1. Monitor for areas of redness and/or skin breakdown  2. Assess vascular access sites hourly  3. Every 4-6 hours minimum:  Change oxygen saturation probe site  4. Every 4-6 hours:  If on nasal continuous positive airway pressure, respiratory therapy assess nares and determine need for appliance change or resting period. Outcome: Progressing  Note: Desmond scale monitoring per protocol. Inspect skin for breakdown frequently. Encourage pt to make frequent large adjustments in position or assist patient with turning. Document all areas of breakdown.         Problem: Safety - Adult  Goal: Free from fall injury  Outcome: Progressing  Flowsheets (Taken 10/16/2022 2258)  Free From Fall Injury: Instruct family/caregiver on patient safety     Problem: Pain  Goal: Verbalizes/displays adequate comfort level or baseline comfort level  Outcome: Progressing  Flowsheets (Taken 10/16/2022 2258)  Verbalizes/displays adequate comfort level or baseline comfort level:   Encourage patient to monitor pain and request assistance   Assess pain using appropriate pain scale   Administer analgesics based on type and severity of pain and evaluate response   Implement non-pharmacological measures as appropriate and evaluate response     Problem: Metabolic/Fluid and Electrolytes - Adult  Goal: Electrolytes maintained within normal limits  Outcome: Progressing  Flowsheets (Taken 10/16/2022 2258)  Electrolytes maintained within normal limits:   Monitor labs and assess patient for signs and symptoms of electrolyte imbalances   Administer electrolyte replacement as ordered   Monitor response to electrolyte replacements, including repeat lab results as appropriate     Problem: Metabolic/Fluid and Electrolytes - Adult  Goal: Glucose maintained within prescribed range  Outcome: Progressing  Flowsheets (Taken 10/16/2022 2258)  Glucose maintained within prescribed range:   Monitor blood glucose as ordered   Assess for signs and symptoms of hyperglycemia and hypoglycemia   Administer ordered medications to maintain glucose within target range

## 2022-10-17 NOTE — PROGRESS NOTES
[facility]   Nephrology progress note  10/17/2022, 9:08 AM      Pt Name:    Fortunato Rose  MRN:     [id number]     YOB: 1955  Admit Date:    10/11/2022  9:22 AM    TELEHEALTH EVALUATION -- Audio/Visual (During ZFOGG-15 public health emergency)     Telehealth service was provided with the patient at her  room [address], [facility] and myself the physician in my office in [address] [facility] and the [address] [facility] who has initiated the visit. Pursuant to the emergency declaration under the [facility], [facility] waiver authority and the [employer] Act, this Virtual  Visit was conducted, with patient's consent, to reduce the patient's risk of exposure to COVID-19 and provide continuity of care for an established patient. Services were provided through a video synchronous discussion virtually to substitute for in-person clinic visit. Chief Complaint: Nephrology following for acute kidney injury and hypernatremia. Subjective:  Patient seen and examined  Not much communicative however she looks much more awake and alert today  Starting to eat and drink a little.     Objective:  24HR INTAKE/OUTPUT:    Intake/Output Summary (Last 24 hours) at 10/17/2022 0908  Last data filed at 10/17/2022 0409  Gross per 24 hour   Intake 1676.36 ml   Output 1150 ml   Net 526.36 ml        Admission weight: 151 lb 6.4 oz (68.7 kg)  Wt Readings from Last 3 Encounters:   10/17/22 160 lb 0.1 oz (72.6 kg)   04/22/21 180 lb (81.6 kg)        Vitals :   Vitals:    10/16/22 1400 10/16/22 1908 10/17/22 0054 10/17/22 0336   BP: 139/70 121/63 (!) 105/54    Pulse: (!) 46 64 64    Resp: 18 18 18    Temp: 97.2 °F (36.2 °C) 97 °F (36.1 °C) 98.1 °F (36.7 °C)    TempSrc: Temporal Temporal Temporal    SpO2: 98% 93% 95%    Weight:    160 lb 0.1 oz (72.6 kg)   Height:           Physical examination-limited due to being a televisit  General Appearance: Well developed. No distress  Mouth/Throat:  Oral mucosa dry  Neck: No accessory muscle use  CNS: Trying to communicate  Musculoskeletal: Trace edema  Psych-not agitated    Medications:  Infusion:    dextrose 50 mL/hr at 10/16/22 1457    dextrose      sodium chloride       Meds:    docusate sodium  100 mg Oral BID    senna  1 tablet Oral Nightly    chlorhexidine  15 mL Mouth/Throat BID    nystatin  5 mL Oral 4x Daily    haloperidol  5 mg Oral TID    ceFAZolin  2,000 mg IntraVENous Q8H    insulin lispro  0-8 Units SubCUTAneous TID WC    insulin lispro  0-4 Units SubCUTAneous Nightly    aspirin  81 mg Oral Daily    benztropine  1 mg Oral BID    divalproex  125 mg Oral TID    folic acid  1 mg Oral Daily    metoprolol tartrate  50 mg Oral BID    pantoprazole  40 mg Oral QAM AC    potassium chloride  10 mEq Oral Daily    pravastatin  40 mg Oral Daily    risperiDONE  2 mg Oral BID    vitamin B-1  100 mg Oral Daily    VORTIoxetine HBr  20 mg Oral Daily    sodium chloride flush  5-40 mL IntraVENous 2 times per day       Lab Data :  CBC:   Recent Labs     10/16/22  0830 10/17/22  0615   WBC 7.5 8.3   HGB 12.9 12.4   HCT 38.2 37.9   PLT See Reflexed IPF Result See Reflexed IPF Result       CMP:  Recent Labs     10/15/22  0635 10/15/22  1930 10/16/22  0830 10/16/22  2106 10/17/22  0615   *   < > 147* 144 145*   K 4.3  --  3.9  --  4.1   *  --  110*  --  110*   CO2 27  --  28  --  25   BUN 16  --  16  --  12   CREATININE 0.93*  --  0.74  --  0.58   GLUCOSE 162*  --  219*  --  183*   CALCIUM 8.3*  --  8.7  --  8.4*    < > = values in this interval not displayed. Hepatic:   No results for input(s): LABALBU, AST, ALT, ALB, BILITOT, ALKPHOS in the last 72 hours.       Assessment and Plan:  Renal -acute kidney injury most likely secondary to volume depletion  Currently improving with IV fluids much better than her baseline  Electrolytes -hyponatremia secondary to intracellular volume depletion overall much improved still at 145 at this time I will increase the D5 W @ 100 mL/h and recheck a sodium level this evening  Acid-base status appears to be stable  Hx of diabetes mellitus  Major depressive disorder with psychotic features  Essential hypertension stable  Meds reviewed and discussed with nursing staff     Vladimir Posey MD  [facility]    This report has been created using voice recognition software.  It may contain minor errors which are inherent in voice recognition technology

## 2022-10-17 NOTE — PROGRESS NOTES
Patient resting comfortably at this time. Patient is sleeping heavily. Respirations equal and unlabored. Patient has a sitter at bedside. Patient is unable to answer questions appropriately at this time or follow simple commands. Patient does not appear to be in pain or show any signs of facial grimacing to indicate pain even with assessment. Assessment and vitals as charted. Bed alarm on, bed locked, gripper socks on, call light within reach. Will continue to monitor this shift.

## 2022-10-17 NOTE — PROGRESS NOTES
Physician Progress Note      Angeli Shahid  CSN #:                  [id number]  :                       1955  ADMIT DATE:       10/11/2022 9:22 AM  DISCH DATE:  RESPONDING  PROVIDER #:        Carlee Calvo MD          QUERY TEXT:    Pt admitted with hypernatremia. Noted documentation of sepsis on 10/17   consult note by ordered ID consultant. If possible, please document in   progress notes and discharge summary:    The medical record reflects the following:  Risk Factors:  Clinical Indicators: 10/11 WBC 13.2, LA 2.5; Creat 2.50-->1.28; Plts 112;   UA+4+ bacteria;  BC+ Staph Aureus methicillin susceptible;  per 10/17 ID   consult--> MSSA septicemia 10-11-22 x 2, Lt parotitis from dehydration and   MSSA, hypernatremia; Treatment: 500ml IVF Bolus, maintenance IVF @ 150ml/hr, IV Ancef IV Levaquin;   monitoring, LA level, ID consult  Options provided:  -- Sepsis confirmed present on admission  -- Sepsis confirmed not present on admission  -- Sepsis ruled out  -- Other - I will add my own diagnosis  -- Disagree - Not applicable / Not valid  -- Disagree - Clinically unable to determine / Unknown  -- Refer to Clinical Documentation Reviewer    PROVIDER RESPONSE TEXT:    The diagnosis of Sepsis was confirmed as present on admission.     Query created by: Esa Sloan on 10/17/2022 2:52 PM      Electronically signed by:  Carlee Calvo MD 10/17/2022 3:06 PM

## 2022-10-17 NOTE — CONSULTS
[facility] - Initial Consult Note- Telemedicine  Today's Date and Time: 10/17/2022, 12:37 PM    Impression :   MSSA septicemia 10-11-22 x 2  Lt parotitis from dehydration and MSSA  Dehydration  Hypernatremia  Altered mental status  SHAINA- Resolved  Cirrhosis of the liver  Major depression with psychotic features  Oral candidiasis            Patient evaluated by Telemedicine. Requesting Institution: [facility]  Provider Institution: [address] [place] [address] at [address] [facility] [place]: Ms France Waite, APRN- IM    Recommendations:   Repeat blood cultures 10-17-22  Continue cefazolin 2 gm IV q 8 hr. Stop date 11-13-22  Alternatively she could receive Ceftriaxone 2 gm IV q 24 hr until 11-13-22, if the facility where she is going can not accommodate IV infusions 3 times daily. Repeat blood cultures x 2 on 11-18-22 to document clearing of septicemia  Oral nystatin  Medical Decision Making/Summary/Discussion:10/17/2022       Infection Control Recommendations   Atkins Precautions      Antimicrobial Stewardship Recommendations     Simplification of therapy  Targeted therapy    Coordination of Outpatient Care:   Estimated Length of IV antimicrobials:11-13-22  Patient will need Midline Catheter Insertion: yes  Patient will need PICC line Insertion:no  Patient will need: Home IV , [facility],  SNF,  LTAC:  Yes  Patient will need outpatient wound care:No    Chief complaint/reason for consultation:   MSSA septicemia      History of Present Illness:   Guy Meadows is a 74y.o.-year-old  female who was initially admitted on 10/11/2022. Patient seen at the request of     INITIAL HISTORY:     Patient was transferred to [facility] fro [place] on 10-11-22 because of altered mental status. She was found to be severely hypernatremic with a Na of 161, dehydrated and in SHAINA. She has received hydration with improvement. Has been evaluated by Nephrology. She has a past Hx of ETOH intake, cirrhosis of the liver with ascites, CAD, essential HTN, DM 2, GERD, bladder incontinence. ID service asked to evaluate because of S aureus septicemia on 10-13-22. CURRENT EVALUATION :10/17/2022    Afebrile  VS stable    Patient feels better  No complaints  No new issues per RN    RR 16  02 sat 99      Labs, X rays reviewed: 10/17/2022    BUN: 12  Cr:0.58  Na 145  K 4.1    WBC: 8.3  Hb:12.4  Plat: 80    Cultures:  Urine:    Blood:  10-11-22: MSSA x 2  Sputum :    Wound:      Discussed with VICK, RN. I have personally reviewed the past medical history, past surgical history, medications, social history, and family history, and I have updated the database accordingly. Past Medical History:     Past Medical History:   Diagnosis Date    Alcohol dependence in remission [facility])     Alcoholic cirrhosis of liver without ascites (HCC)     ASHD (arteriosclerotic heart disease)     Bacteremia due to Staphylococcus aureus 10/13/2022    Depression     Diabetes mellitus (HCC)     GERD (gastroesophageal reflux disease)     Hepatitis C     WITH FULL RECOVERY - INFECTIOUS DISEASE DR. Srinivasan Hidalgo - LAST VISIT 7/2020    Hyperlipidemia     Hypertension     Irregular heartbeat     Murmur, cardiac     noted by PCP, no echo per patient    Urinary bladder incontinence     Wears eyeglasses     Well adult health check     PCP - DR. Kj Guido - 3/2021    Well adult health check     INFECTIOUS DISEASE - DR. Megan Maldonado    Well adult health check     GI - DR. Milli LEONARD       Past Surgical  History:     Past Surgical History:   Procedure Laterality Date    BREAST SURGERY Right 1975    REMOVAL BENIGN TUMOR    COLONOSCOPY      HIP SURGERY Right 1964    RECONSTRUCTION    TONSILLECTOMY      AS A CHILD    TUBAL LIGATION  1975    VITRECTOMY Left 04/22/2021    VITRECTOMY Left 4/22/2021    VITRECTOMY 25 GAUGE, MEMBRANE PEEL, ICG performed by Myriam Murrieta Gallo Briseno MD at [facility] OR       Medications:      docusate sodium  100 mg Oral BID    senna  1 tablet Oral Nightly    chlorhexidine  15 mL Mouth/Throat BID    nystatin  5 mL Oral 4x Daily    haloperidol  5 mg Oral TID    ceFAZolin  2,000 mg IntraVENous Q8H    insulin lispro  0-8 Units SubCUTAneous TID WC    insulin lispro  0-4 Units SubCUTAneous Nightly    aspirin  81 mg Oral Daily    benztropine  1 mg Oral BID    divalproex  125 mg Oral TID    folic acid  1 mg Oral Daily    metoprolol tartrate  50 mg Oral BID    pantoprazole  40 mg Oral QAM AC    potassium chloride  10 mEq Oral Daily    pravastatin  40 mg Oral Daily    risperiDONE  2 mg Oral BID    vitamin B-1  100 mg Oral Daily    VORTIoxetine HBr  20 mg Oral Daily    sodium chloride flush  5-40 mL IntraVENous 2 times per day       Social History:     Social History     Socioeconomic History    Marital status: Single     Spouse name: Not on file    Number of children: Not on file    Years of education: Not on file    Highest education level: Not on file   Occupational History    Not on file   Tobacco Use    Smoking status: Former     Types: Cigarettes     Quit date:      Years since quittin.8    Smokeless tobacco: Never   Vaping Use    Vaping Use: Never used   Substance and Sexual Activity    Alcohol use: Not on file    Drug use: Not on file    Sexual activity: Not on file   Other Topics Concern    Not on file   Social History Narrative    Not on file     Social Determinants of Health     Financial Resource Strain: Not on file   Food Insecurity: Not on file   Transportation Needs: Not on file   Physical Activity: Not on file   Stress: Not on file   Social Connections: Not on file   Intimate Partner Violence: Not on file   Housing Stability: Not on file       Family History:   History reviewed. No pertinent family history. Allergies:   Lisinopril     Review of Systems:     Patient unable to provide ROS.  Confused and agitated      Constitutional: No fevers or chills. No systemic complaints  Head: No headaches  Eyes: No double vision or blurry vision. No conjunctival inflammation. ENT: No sore throat or runny nose. . No hearing loss, tinnitus or vertigo. Cardiovascular: No chest pain or palpitations. No shortness of breath. No TESFAYE  Lung: No shortness of breath or cough. No sputum production  Abdomen: No nausea, vomiting, diarrhea, or abdominal pain. Velasquez Colon No cramps. Genitourinary: No increased urinary frequency, or dysuria. No hematuria. No suprapubic or CVA pain  Musculoskeletal: No muscle aches or pains. No joint effusions, swelling or deformities  Hematologic: No bleeding or bruising. Neurologic: No headache, weakness, numbness, or tingling. Integument: No rash, no ulcers. Psychiatric: No depression. Endocrine: No polyuria, no polydipsia, no polyphagia. Physical Examination :   Patient Vitals for the past 8 hrs:   BP Temp Temp src Pulse Resp SpO2   10/17/22 0915 128/80 96.8 °F (36 °C) Temporal 63 16 99 %     General Appearance: Awake, alert, and in no apparent distress  Head:  Normocephalic, no trauma  Eyes: Pupils equal, round, reactive to light and accommodation; extraocular movements intact; sclera anicteric; conjunctivae pink. No embolic phenomena. ENT: Oropharynx clear, without erythema, exudate, or thrush. No tenderness of sinuses. Mouth/throat: mucosa pink and moist. No lesions. Lt side parotitis   Neck:Supple, without lymphadenopathy. Thyroid normal, No bruits. Pulmonary/Chest: Clear to auscultation, without wheezes, rales, or rhonchi. No dullness to percussion. Cardiovascular: Regular rate and rhythm without murmurs, rubs, or gallops. Abdomen: Soft, non tender. Bowel sounds normal. No organomegaly. Ascites. All four Extremities: No cyanosis, clubbing, edema, or effusions. Neurologic: No gross sensory or motor deficits. Confused and agitated . Has tremors  Skin: Warm and dry with good turgor. No signs of peripheral arterial or venous Control and Prevention measures reviewed  All prior entries were reviewed  Administer medications as ordered  Prognosis: Guarded  Discharge planning reviewed  Follow up as outpatient. Thank you for allowing us to participate in the care of this patient. Please call with questions.     Sowmya Vela MD  Pager: [phone] - Office: [phone]

## 2022-10-18 PROBLEM — G47.33 OSA (OBSTRUCTIVE SLEEP APNEA): Status: ACTIVE | Noted: 2022-10-18

## 2022-10-18 LAB
ABSOLUTE EOS #: 0.23 K/UL (ref 0–0.44)
ABSOLUTE IMMATURE GRANULOCYTE: 0.15 K/UL (ref 0–0.3)
ABSOLUTE LYMPH #: 1.58 K/UL (ref 1.1–3.7)
ABSOLUTE MONO #: 0.83 K/UL (ref 0.1–1.2)
ANION GAP SERPL CALCULATED.3IONS-SCNC: 9 MMOL/L (ref 9–17)
BASOPHILS # BLD: 1 % (ref 0–2)
BASOPHILS ABSOLUTE: 0.08 K/UL (ref 0–0.2)
BUN BLDV-MCNC: 8 MG/DL (ref 8–23)
BUN/CREAT BLD: 11 (ref 9–20)
CALCIUM SERPL-MCNC: 8.3 MG/DL (ref 8.6–10.4)
CHLORIDE BLD-SCNC: 105 MMOL/L (ref 98–107)
CO2: 27 MMOL/L (ref 20–31)
CREAT SERPL-MCNC: 0.73 MG/DL (ref 0.5–0.9)
EOSINOPHILS RELATIVE PERCENT: 3 % (ref 1–4)
GFR SERPL CREATININE-BSD FRML MDRD: >60 ML/MIN/1.73M2
GLUCOSE BLD-MCNC: 170 MG/DL (ref 74–100)
GLUCOSE BLD-MCNC: 195 MG/DL (ref 74–100)
GLUCOSE BLD-MCNC: 214 MG/DL (ref 70–99)
GLUCOSE BLD-MCNC: 227 MG/DL (ref 74–100)
GLUCOSE BLD-MCNC: 233 MG/DL (ref 74–100)
HCO3 VENOUS: 27.3 MMOL/L (ref 24–30)
HCT VFR BLD CALC: 37.3 % (ref 36.3–47.1)
HEMOGLOBIN: 12.4 G/DL (ref 11.9–15.1)
IMMATURE GRANULOCYTES: 2 %
LYMPHOCYTES # BLD: 21 % (ref 24–43)
MAGNESIUM: 2.1 MG/DL (ref 1.6–2.6)
MCH RBC QN AUTO: 29.3 PG (ref 25.2–33.5)
MCHC RBC AUTO-ENTMCNC: 33.2 G/DL (ref 28.4–34.8)
MCV RBC AUTO: 88.2 FL (ref 82.6–102.9)
MONOCYTES # BLD: 11 % (ref 3–12)
MORPHOLOGY: ABNORMAL
NRBC AUTOMATED: 0 PER 100 WBC
O2 SAT, VEN: 95.2 % (ref 60–85)
PATIENT TEMP: 37
PCO2, VEN: 38 MM HG (ref 39–55)
PDW BLD-RTO: 12.5 % (ref 11.8–14.4)
PH VENOUS: 7.47 (ref 7.32–7.42)
PLATELET # BLD: ABNORMAL K/UL (ref 138–453)
PLATELET, FLUORESCENCE: 97 K/UL (ref 138–453)
PLATELET, IMMATURE FRACTION: 3 % (ref 1.1–10.3)
PO2, VEN: 70.4 MM HG (ref 30–50)
POSITIVE BASE EXCESS, VEN: 3.7 MMOL/L (ref 0–2)
POTASSIUM SERPL-SCNC: 3.4 MMOL/L (ref 3.7–5.3)
PT. POSITION: ABNORMAL
RBC # BLD: 4.23 M/UL (ref 3.95–5.11)
SEG NEUTROPHILS: 62 % (ref 36–65)
SEGMENTED NEUTROPHILS ABSOLUTE COUNT: 4.63 K/UL (ref 1.5–8.1)
SODIUM BLD-SCNC: 141 MMOL/L (ref 135–144)
WBC # BLD: 7.5 K/UL (ref 3.5–11.3)

## 2022-10-18 PROCEDURE — 83735 ASSAY OF MAGNESIUM: CPT

## 2022-10-18 PROCEDURE — 97535 SELF CARE MNGMENT TRAINING: CPT

## 2022-10-18 PROCEDURE — 94660 CPAP INITIATION&MGMT: CPT

## 2022-10-18 PROCEDURE — 2580000003 HC RX 258: Performed by: INTERNAL MEDICINE

## 2022-10-18 PROCEDURE — 94761 N-INVAS EAR/PLS OXIMETRY MLT: CPT

## 2022-10-18 PROCEDURE — 99232 SBSQ HOSP IP/OBS MODERATE 35: CPT | Performed by: INTERNAL MEDICINE

## 2022-10-18 PROCEDURE — 6360000002 HC RX W HCPCS: Performed by: NURSE PRACTITIONER

## 2022-10-18 PROCEDURE — 6370000000 HC RX 637 (ALT 250 FOR IP): Performed by: NURSE PRACTITIONER

## 2022-10-18 PROCEDURE — 80048 BASIC METABOLIC PNL TOTAL CA: CPT

## 2022-10-18 PROCEDURE — 82947 ASSAY GLUCOSE BLOOD QUANT: CPT

## 2022-10-18 PROCEDURE — 87040 BLOOD CULTURE FOR BACTERIA: CPT

## 2022-10-18 PROCEDURE — 97530 THERAPEUTIC ACTIVITIES: CPT

## 2022-10-18 PROCEDURE — 6360000002 HC RX W HCPCS: Performed by: INTERNAL MEDICINE

## 2022-10-18 PROCEDURE — 1200000000 HC SEMI PRIVATE

## 2022-10-18 PROCEDURE — 85055 RETICULATED PLATELET ASSAY: CPT

## 2022-10-18 PROCEDURE — 82805 BLOOD GASES W/O2 SATURATION: CPT

## 2022-10-18 PROCEDURE — 36415 COLL VENOUS BLD VENIPUNCTURE: CPT

## 2022-10-18 PROCEDURE — 85025 COMPLETE CBC W/AUTO DIFF WBC: CPT

## 2022-10-18 PROCEDURE — 6370000000 HC RX 637 (ALT 250 FOR IP): Performed by: FAMILY MEDICINE

## 2022-10-18 PROCEDURE — 97110 THERAPEUTIC EXERCISES: CPT

## 2022-10-18 RX ADMIN — BENZTROPINE MESYLATE 1 MG: 1 TABLET ORAL at 20:50

## 2022-10-18 RX ADMIN — DIVALPROEX SODIUM 125 MG: 125 CAPSULE ORAL at 20:50

## 2022-10-18 RX ADMIN — CEFAZOLIN 2000 MG: 2 INJECTION, POWDER, FOR SOLUTION INTRAMUSCULAR; INTRAVENOUS at 20:20

## 2022-10-18 RX ADMIN — NYSTATIN 500000 UNITS: 100000 SUSPENSION ORAL at 16:43

## 2022-10-18 RX ADMIN — CHLORHEXIDINE GLUCONATE 0.12% ORAL RINSE 15 ML: 1.2 LIQUID ORAL at 09:00

## 2022-10-18 RX ADMIN — RISPERIDONE 2 MG: 1 TABLET, FILM COATED ORAL at 20:49

## 2022-10-18 RX ADMIN — HALOPERIDOL 5 MG: 5 TABLET ORAL at 15:42

## 2022-10-18 RX ADMIN — CEFAZOLIN 2000 MG: 2 INJECTION, POWDER, FOR SOLUTION INTRAMUSCULAR; INTRAVENOUS at 11:54

## 2022-10-18 RX ADMIN — CHLORHEXIDINE GLUCONATE 0.12% ORAL RINSE 15 ML: 1.2 LIQUID ORAL at 20:51

## 2022-10-18 RX ADMIN — CEFAZOLIN 2000 MG: 2 INJECTION, POWDER, FOR SOLUTION INTRAMUSCULAR; INTRAVENOUS at 05:00

## 2022-10-18 RX ADMIN — HALOPERIDOL 5 MG: 5 TABLET ORAL at 20:49

## 2022-10-18 RX ADMIN — INSULIN LISPRO 2 UNITS: 100 INJECTION, SOLUTION INTRAVENOUS; SUBCUTANEOUS at 16:43

## 2022-10-18 RX ADMIN — METOPROLOL TARTRATE 50 MG: 50 TABLET, FILM COATED ORAL at 20:49

## 2022-10-18 RX ADMIN — NYSTATIN 500000 UNITS: 100000 SUSPENSION ORAL at 09:00

## 2022-10-18 RX ADMIN — BENZTROPINE MESYLATE 1 MG: 1 TABLET ORAL at 11:49

## 2022-10-18 RX ADMIN — NYSTATIN 500000 UNITS: 100000 SUSPENSION ORAL at 13:52

## 2022-10-18 RX ADMIN — SENNOSIDES 8.6 MG: 8.6 TABLET, FILM COATED ORAL at 20:49

## 2022-10-18 RX ADMIN — LORAZEPAM 1 MG: 1 TABLET ORAL at 22:28

## 2022-10-18 RX ADMIN — NYSTATIN 500000 UNITS: 100000 SUSPENSION ORAL at 21:12

## 2022-10-18 RX ADMIN — POTASSIUM CHLORIDE: 2 INJECTION, SOLUTION, CONCENTRATE INTRAVENOUS at 11:31

## 2022-10-18 RX ADMIN — RISPERIDONE 2 MG: 1 TABLET, FILM COATED ORAL at 11:49

## 2022-10-18 RX ADMIN — DOCUSATE SODIUM 100 MG: 100 CAPSULE, LIQUID FILLED ORAL at 20:51

## 2022-10-18 RX ADMIN — DIVALPROEX SODIUM 125 MG: 125 CAPSULE ORAL at 15:42

## 2022-10-18 RX ADMIN — DEXTROSE MONOHYDRATE: 50 INJECTION, SOLUTION INTRAVENOUS at 00:28

## 2022-10-18 NOTE — PROGRESS NOTES
Patient resting comfortably at this time with eyes closed. Patient has sitter at bedside. Patient is unable to answer questions at this time or follow commands due to sleeping well. Assessment and vitals as charted. Bed alarm on, bed locked, gripper socks on, call light within reach. Will continue to monitor this shift. Patient does not appear to be in any pain at this time.

## 2022-10-18 NOTE — PROGRESS NOTES
Physical Therapy  Facility/Department: [facility] AT THE [facility] MED SURG  Daily Treatment Note  NAME: Ankush Ca  : 1955  MRN: [id number]  Date of Service: 10/18/2022  Discharge Recommendations:  [address]   Patient Diagnosis(es): The primary encounter diagnosis was Hypernatremia. Diagnoses of Dehydration and Altered mental status, unspecified altered mental status type were also pertinent to this visit. Assessment   Assessment: Pt. able to stand x4 for aprox 2' each with Min A x2 for support. PT. was able to take 5 steps to [facility] with HHA x2 Min A x2. Activity Tolerance: Patient limited by fatigue;Treatment limited secondary to decreased cognition;Patient tolerated treatment well   Plan    Physcial Therapy Plan  General Plan: 2 times a day 7 days a week  Current Treatment Recommendations: Strengthening;ROM;Balance training;Functional mobility training;Transfer training;ADL/Self-care training;Neuromuscular re-education;Gait training; Safety education & training;Patient/Caregiver education & training   Restrictions  Restrictions/Precautions  Restrictions/Precautions: General Precautions, Fall Risk  Required Braces or Orthoses?: No   Subjective    Subjective  Subjective: Pt. up and wanting to get up, RN ok with therapy to work with pt. at this time. Pain: denied  Orientation  Overall Orientation Status: Impaired   Objective   Bed Mobility Training  Bed Mobility Training: Yes  Overall Level of Assistance: Minimum assistance;Assist X2  Interventions: Manual cues;Demonstration;Visual cues; Tactile cues  Supine to Sit: Minimum assistance;Assist X2  Sit to Supine: Minimum assistance;Assist X2  Scooting: Minimum assistance  Balance  Sitting: Impaired  Sitting - Static: Fair (occasional)  Sitting - Dynamic: Fair (occasional)  Standing: Impaired (HHA x 2 with Fair tolerance)  Standing - Static: Fair  Transfer Training  Transfer Training: Yes  Overall Level of Assistance: Minimum assistance;Assist X2;Contact-guard assistance  Interventions: Demonstration;Verbal cues; Tactile cues; Visual cues  Sit to Stand: Contact-guard assistance;Minimum assistance;Assist X2  Stand to Sit: Minimum assistance;Assist X2;Contact-guard assistance  Gait Training  Gait Training: Yes  Gait  Overall Level of Assistance: Minimum assistance;Assist X2  Interventions: Verbal cues  Speed/Velia: Slow  Step Length: Left shortened;Right shortened  Distance (ft):  (5 steps to [facility])  Assistive Device: Other (comment) (HHA x2)  Other Specialty Interventions  Other Treatments/Modalities: standing tolerance x4: aprox 2' bouts with noted instability with Min A to maintain standing at times. Safety Devices  Type of Devices: Call light within reach; Left in bed;Bed alarm in place;Sitter present   Goals  Short Term Goals  Time Frame for Short Term Goals: 3 DAYS  Short Term Goal 1: MOD ASSIST BED MOBILITY  Short Term Goal 2: MIN ASSIST TRANSFERS. Long Term Goals  Time Frame for Long Term Goals : 4 WEEKS  Long Term Goal 1: MIN ASSIST TRANSFERS AND MIN ASSIST BED MOBILITY. Patient Goals   Patient Goals : UNABLE TO ASCERTAIN DUE TO IMPAIRED COGNITION AND LETHARGY. Education  Patient Education  Education Given To: Patient  Education Provided: Transfer Training  Education Provided Comments: Cues for technique with bed mobility, side stepping and transfers to optimize safety--poor+ to fair- correction noted, limited by cognition.   Education Method: Verbal;Demonstration  Barriers to Learning: Cognition  Education Outcome: Continued education needed  Therapy Time   Individual Concurrent Group Co-treatment   Time In 1301         Time Out 1324         Minutes [address] [facility] , [place]

## 2022-10-18 NOTE — PROGRESS NOTES
Spoke with sister/legal guardian for Patient via telephone conversation this p.m. re:  MD recommendations for continued IV ATB and placement considerations as facility that Patient has been at prior to her [address] stay has no beds open at this point for her to return. Sister states that she prefers that Patient be placed close to her home in [facility] so she can be active in her care. Referral made to [facility] [place] as they have a locked unit for Patient behaviors and can accomodate Patient medical care needs. Spoke with [employer] and referral FAXED per this writer today. Will await reply from facility and assist with discharge placement as appropriate.     Danielle Villarreal [place]  10/18/2022

## 2022-10-18 NOTE — PROGRESS NOTES
[facility]  Inpatient/Observation/Outpatient Rehabilitation    Date: 10/18/2022  Patient Name: Sanjana Bone       [] Inpatient Acute/Observation       []  Outpatient  : 1955       [] Pt no showed for scheduled appointment    [] Pt refused/declined therapy at this time due to:           [x] Pt cancelled due to:  [] No Reason Given   [] Sick/ill   [] Other: Hold per RN, pt. Sleeping at this time, will let therapy know if pt. Wakes and is able to work with therapy. Therapist/Assistant will attempt to see this patient, at our earliest opportunity.        Boris Sanchez, NAN Date: 10/18/2022

## 2022-10-18 NOTE — CONSULTS
[facility] - Progress Note  - Telemedicine  Today's Date and Time: 10/18/2022, 11:19 AM    Impression :   MSSA septicemia 10-11-22 x 2  Lt parotitis from dehydration and MSSA  Dehydration  Hypernatremia  Altered mental status  SHAINA- Resolved  Cirrhosis of the liver  Major depression with psychotic features  Oral candidiasis            Patient evaluated by Telemedicine. Requesting Institution: [facility]  Provider Institution: [address] [place] [address],[address] at [facility]: Ms Frankey Cecily, APRN- IM    Recommendations:   Repeat blood cultures 10-18-22  Continue cefazolin 2 gm IV q 8 hr. Stop date 11-13-22  Alternatively she could receive Ceftriaxone 2 gm IV q 24 hr until 11-13-22, if the facility where she is going can not accommodate IV infusions 3 times daily. Repeat blood cultures x 2 on 11-18-22 to document clearing of septicemia  Oral nystatin  Medical Decision Making/Summary/Discussion:10/18/2022       Infection Control Recommendations   [place] Precautions      Antimicrobial Stewardship Recommendations     Simplification of therapy  Targeted therapy    Coordination of Outpatient Care:   Estimated Length of IV antimicrobials:11-13-22  Patient will need Midline Catheter Insertion: yes  Patient will need PICC line Insertion:no  Patient will need: Home IV , [place],  SNF,  LTAC:  Yes  Patient will need outpatient wound care:No    Chief complaint/reason for consultation:   MSSA septicemia      History of Present Illness:   Marino Segura is a 74y.o.-year-old  female who was initially admitted on 10/11/2022. Patient seen at the request of     INITIAL HISTORY:     Patient was transferred to [facility] fro [place] on 10-11-22 because of altered mental status. She was found to be severely hypernatremic with a Na of 161, dehydrated and in SHAINA. She has received hydration with improvement. Has been evaluated by Nephrology. She has a past Hx of ETOH intake, cirrhosis of the liver with ascites, CAD, essential HTN, DM 2, GERD, bladder incontinence. ID service asked to evaluate because of S aureus septicemia on 10-13-22. CURRENT EVALUATION :10/18/2022    Afebrile  VS stable. BP fluctuates in lower range    Patient stable  Hypernatremia improved  Mentation remains abnormal. Responds only to painful stimuli  No complaints  No new issues per RN    RR 16-->20  02 sat 99-->98      Labs, X rays reviewed: 10/18/2022    BUN: 12-->8  Cr:0.58-->0.73  Na 145-->145  K 4.1-->3.4    WBC: 8.3-->7.5  Hb:12.4  Plat: 80-->97    Cultures:  Urine:    Blood:  10-11-22: MSSA x 2  10-18-22:   Sputum :    Wound:      Discussed with VICK RN. I have personally reviewed the past medical history, past surgical history, medications, social history, and family history, and I have updated the database accordingly. Past Medical History:     Past Medical History:   Diagnosis Date    Alcohol dependence in remission [facility])     Alcoholic cirrhosis of liver without ascites (HCC)     ASHD (arteriosclerotic heart disease)     Bacteremia due to Staphylococcus aureus 10/13/2022    Depression     Diabetes mellitus (HCC)     GERD (gastroesophageal reflux disease)     Hepatitis C     WITH FULL RECOVERY - INFECTIOUS DISEASE DR. Jeffrey Erazo - LAST VISIT 7/2020    Hyperlipidemia     Hypertension     Irregular heartbeat     Murmur, cardiac     noted by PCP, no echo per patient    Urinary bladder incontinence     Wears eyeglasses     Well adult health check     PCP - DR. Alessandra Lundberg - 3/2021    Well adult health check     INFECTIOUS DISEASE - DR. Frazier [facility]    Well adult health check     GI - DR. Chiqui LEONARD       Past Surgical  History:     Past Surgical History:   Procedure Laterality Date    BREAST SURGERY Right 1975    REMOVAL BENIGN TUMOR    COLONOSCOPY      HIP SURGERY Right [facility] AS A CHILD    TUBAL LIGATION      VITRECTOMY Left 2021    VITRECTOMY Left 2021    VITRECTOMY 25 GAUGE, MEMBRANE PEEL, ICG performed by Franciscolima Burk MD at [facility] OR       Medications:      IVPB builder   IntraVENous Once    docusate sodium  100 mg Oral BID    senna  1 tablet Oral Nightly    chlorhexidine  15 mL Mouth/Throat BID    nystatin  5 mL Oral 4x Daily    haloperidol  5 mg Oral TID    ceFAZolin  2,000 mg IntraVENous Q8H    insulin lispro  0-8 Units SubCUTAneous TID WC    insulin lispro  0-4 Units SubCUTAneous Nightly    aspirin  81 mg Oral Daily    benztropine  1 mg Oral BID    divalproex  125 mg Oral TID    folic acid  1 mg Oral Daily    metoprolol tartrate  50 mg Oral BID    pantoprazole  40 mg Oral QAM AC    potassium chloride  10 mEq Oral Daily    pravastatin  40 mg Oral Daily    risperiDONE  2 mg Oral BID    vitamin B-1  100 mg Oral Daily    VORTIoxetine HBr  20 mg Oral Daily    sodium chloride flush  5-40 mL IntraVENous 2 times per day       Social History:     Social History     Socioeconomic History    Marital status: Single     Spouse name: Not on file    Number of children: Not on file    Years of education: Not on file    Highest education level: Not on file   Occupational History    Not on file   Tobacco Use    Smoking status: Former     Types: Cigarettes     Quit date:      Years since quittin.8    Smokeless tobacco: Never   Vaping Use    Vaping Use: Never used   Substance and Sexual Activity    Alcohol use: Not on file    Drug use: Not on file    Sexual activity: Not on file   Other Topics Concern    Not on file   Social History Narrative    Not on file     Social Determinants of Health     Financial Resource Strain: Not on file   Food Insecurity: Not on file   Transportation Needs: Not on file   Physical Activity: Not on file   Stress: Not on file   Social Connections: Not on file   Intimate Partner Violence: Not on file   Housing Stability: Not on file       Family History:   History reviewed. No pertinent family history. Allergies:   Lisinopril     Review of Systems:     Patient unable to provide ROS. Confused and agitated      Constitutional: No fevers or chills. No systemic complaints  Head: No headaches  Eyes: No double vision or blurry vision. No conjunctival inflammation. ENT: No sore throat or runny nose. . No hearing loss, tinnitus or vertigo. Cardiovascular: No chest pain or palpitations. No shortness of breath. No TESFAYE  Lung: No shortness of breath or cough. No sputum production  Abdomen: No nausea, vomiting, diarrhea, or abdominal pain. Lucyann Push No cramps. Genitourinary: No increased urinary frequency, or dysuria. No hematuria. No suprapubic or CVA pain  Musculoskeletal: No muscle aches or pains. No joint effusions, swelling or deformities  Hematologic: No bleeding or bruising. Neurologic: No headache, weakness, numbness, or tingling. Integument: No rash, no ulcers. Psychiatric: No depression. Endocrine: No polyuria, no polydipsia, no polyphagia. Physical Examination :   Patient Vitals for the past 8 hrs:   BP Temp Temp src Pulse Resp SpO2 Weight   10/18/22 1002 -- -- -- -- 20 -- --   10/18/22 0853 119/67 (!) 96.6 °F (35.9 °C) Temporal 56 14 98 % --   10/18/22 0647 (!) 92/53 97.3 °F (36.3 °C) Temporal 58 18 93 % --   10/18/22 0406 -- -- -- -- -- -- 155 lb 14.4 oz (70.7 kg)       General Appearance: Awake, alert, and in no apparent distress  Head:  Normocephalic, no trauma  Eyes: Pupils equal, round, reactive to light and accommodation; extraocular movements intact; sclera anicteric; conjunctivae pink. No embolic phenomena. ENT: Oropharynx clear, without erythema, exudate, or thrush. No tenderness of sinuses. Mouth/throat: mucosa pink and moist. No lesions. Lt side parotitis   Neck:Supple, without lymphadenopathy. Thyroid normal, No bruits. Pulmonary/Chest: Clear to auscultation, without wheezes, rales, or rhonchi. No dullness to percussion.    Cardiovascular: Regular rate and rhythm without murmurs, rubs, or gallops. Abdomen: Soft, non tender. Bowel sounds normal. No organomegaly. Ascites. All four Extremities: No cyanosis, clubbing, edema, or effusions. Neurologic: No gross sensory or motor deficits. Confused and agitated . Has tremors  Skin: Warm and dry with good turgor. No signs of peripheral arterial or venous insufficiency. No ulcerations. No open wounds. Medical Decision Making -Laboratory:   I have independently reviewed/ordered the following labs:    CBC with Differential:   Recent Labs     10/17/22  0615 10/18/22  0620   WBC 8.3 7.5   HGB 12.4 12.4   HCT 37.9 37.3   PLT See Reflexed IPF Result See Reflexed IPF Result   LYMPHOPCT 22* 21*   MONOPCT 10 11       BMP:   Recent Labs     10/17/22  0615 10/17/22  1726 10/18/22  0620   * 143 141   K 4.1  --  3.4*   *  --  105   CO2 25  --  27   BUN 12  --  8   CREATININE 0.58  --  0.73   MG  --   --  2.1       Hepatic Function Panel: No results for input(s): PROT, LABALBU, BILIDIR, IBILI, BILITOT, ALKPHOS, ALT, AST in the last 72 hours. No results for input(s): RPR in the last 72 hours. No results for input(s): HIV in the last 72 hours. No results for input(s): BC in the last 72 hours. Lab Results   Component Value Date/Time    MUCUS TRACE 10/11/2022 07:00 PM    RBC 4.23 10/18/2022 06:20 AM    WBC 7.5 10/18/2022 06:20 AM    TURBIDITY Clear 10/11/2022 07:00 PM     Lab Results   Component Value Date/Time    CREATININE 0.73 10/18/2022 06:20 AM    GLUCOSE 214 10/18/2022 06:20 AM       Medical Decision Making-Imaging:     EXAMINATION:   ONE XRAY VIEW OF THE CHEST       10/11/2022 10:30 am       COMPARISON:   None. HISTORY:   ORDERING SYSTEM PROVIDED HISTORY: altered mental status   TECHNOLOGIST PROVIDED HISTORY:   altered mental status       FINDINGS:   The heart is not enlarged. No pulmonary venous congestion or edema. No   convincing lung consolidation or infiltrate.   No pleural effusion or pneumothorax. Osseous structures grossly intact. Impression   No acute cardiopulmonary process     Medical Decision [id number]:       Medical Decision Making-Other:     Note:  Labs, medications, radiologic studies were reviewed with personal review of films  Large amounts of data were reviewed  Discussed with nursing Staff, Discharge planner  Infection Control and Prevention measures reviewed  All prior entries were reviewed  Administer medications as ordered  Prognosis: Guarded  Discharge planning reviewed  Follow up as outpatient. Thank you for allowing us to participate in the care of this patient. Please call with questions.     Daniel Bojorquez MD  Pager: [phone] - Office: [phone]

## 2022-10-18 NOTE — PROGRESS NOTES
Entered patients room to attempt to administer morning medications. Patient resting in bed. 1:1 sitter stated that she attempted to feed patient breakfast and she would not wake up. Attempted to wake patient with verbal direction. Sternal rub at th is time and patient did awaken. Performed oral care. Patient not awake or alert enough for PO medications at this time. Maryellen Quintero ordered VBG and PAP at this time.

## 2022-10-18 NOTE — PROGRESS NOTES
[facility]   Nephrology progress note  10/18/2022, 11:02 AM      Pt Name:    Guy Meadows  MRN:     [id number]     YOB: 1955  Admit Date:    10/11/2022  9:22 AM    TELEHEALTH EVALUATION -- Audio/Visual (During REB-83 public health emergency)     Telehealth service was provided with the patient at her  room [address], [facility] and myself the physician in my office in [address] [place] and the [place] [address] who has initiated the visit. Pursuant to the emergency declaration under the [address] [place] [address], Replaced by [facility] waiver authority and the [employer] Act, this Virtual  Visit was conducted, with patient's consent, to reduce the patient's risk of exposure to COVID-19 and provide continuity of care for an established patient. Services were provided through a video synchronous discussion virtually to substitute for in-person clinic visit. Chief Complaint: Nephrology following for acute kidney injury and hypernatremia. Subjective:  Patient seen and examined  Not much communicative today. Not eating or drinking well not taking any medications  Currently on a BiPAP    Objective:  24HR INTAKE/OUTPUT:    Intake/Output Summary (Last 24 hours) at 10/18/2022 1102  Last data filed at 10/18/2022 0935  Gross per 24 hour   Intake 2759 ml   Output 1875 ml   Net 884 ml        Admission weight: 151 lb 6.4 oz (68.7 kg)  Wt Readings from Last 3 Encounters:   10/18/22 155 lb 14.4 oz (70.7 kg)   04/22/21 180 lb (81.6 kg)        Vitals :   Vitals:    10/18/22 0406 10/18/22 0647 10/18/22 0853 10/18/22 1002   BP:  (!) 92/53 119/67    Pulse:  58 56    Resp:  18 14 20   Temp:  97.3 °F (36.3 °C) (!) 96.6 °F (35.9 °C)    TempSrc:  Temporal Temporal    SpO2:  93% 98%    Weight: 155 lb 14.4 oz (70.7 kg)      Height:           Physical examination-limited due to being a televisit  General Appearance:  Well developed.  No distress  Mouth/Throat: Oral mucosa dry  Neck: No accessory muscle use  CNS: Trying to communicate  Musculoskeletal: Trace edema  Psych-not agitated    Medications:  Infusion:    dextrose 100 mL/hr at 10/18/22 0028    dextrose      sodium chloride       Meds:    docusate sodium  100 mg Oral BID    senna  1 tablet Oral Nightly    chlorhexidine  15 mL Mouth/Throat BID    nystatin  5 mL Oral 4x Daily    haloperidol  5 mg Oral TID    ceFAZolin  2,000 mg IntraVENous Q8H    insulin lispro  0-8 Units SubCUTAneous TID WC    insulin lispro  0-4 Units SubCUTAneous Nightly    aspirin  81 mg Oral Daily    benztropine  1 mg Oral BID    divalproex  125 mg Oral TID    folic acid  1 mg Oral Daily    metoprolol tartrate  50 mg Oral BID    pantoprazole  40 mg Oral QAM AC    potassium chloride  10 mEq Oral Daily    pravastatin  40 mg Oral Daily    risperiDONE  2 mg Oral BID    vitamin B-1  100 mg Oral Daily    VORTIoxetine HBr  20 mg Oral Daily    sodium chloride flush  5-40 mL IntraVENous 2 times per day       Lab Data :  CBC:   Recent Labs     10/16/22  0830 10/17/22  0615 10/18/22  0620   WBC 7.5 8.3 7.5   HGB 12.9 12.4 12.4   HCT 38.2 37.9 37.3   PLT See Reflexed IPF Result See Reflexed IPF Result See Reflexed IPF Result       CMP:  Recent Labs     10/16/22  0830 10/16/22  2106 10/17/22  0615 10/17/22  1726 10/18/22  0620   *   < > 145* 143 141   K 3.9  --  4.1  --  3.4*   *  --  110*  --  105   CO2 28  --  25  --  27   BUN 16  --  12  --  8   CREATININE 0.74  --  0.58  --  0.73   GLUCOSE 219*  --  183*  --  214*   CALCIUM 8.7  --  8.4*  --  8.3*   MG  --   --   --   --  2.1    < > = values in this interval not displayed. Hepatic:   No results for input(s): LABALBU, AST, ALT, ALB, BILITOT, ALKPHOS in the last 72 hours.       Assessment and Plan:  Renal -acute kidney injury most likely secondary to volume depletion  Currently improving with IV fluids much better than her baseline  Electrolytes -hyponatremia secondary to intracellular volume depletion overall much improved sodium is down to 141. As she is not eating or drinking much we will decrease the rate to 50 mill per hour and continue for now  Hypokalemia we will give 40 mEq IV 1 dose today  Acid-base status appears to be stable  Hx of diabetes mellitus  Major depressive disorder with psychotic features  Essential hypertension stable  Meds reviewed and discussed with nursing staff     Ady Estes MD  [facility]    This report has been created using voice recognition software.  It may contain minor errors which are inherent in voice recognition technology

## 2022-10-18 NOTE — PROGRESS NOTES
Patient awake at this time and able to follow simple commands, able to verbalize very small and simple words. Patient attempted to get out of bed but was easily redirected.

## 2022-10-18 NOTE — PLAN OF CARE
Problem: Discharge Planning  Goal: Discharge to home or other facility with appropriate resources  Outcome: Progressing     Problem: ABCDS Injury Assessment  Goal: Absence of physical injury  Outcome: Progressing     Problem: Skin/Tissue Integrity  Goal: Absence of new skin breakdown  Description: 1. Monitor for areas of redness and/or skin breakdown  2. Assess vascular access sites hourly  3. Every 4-6 hours minimum:  Change oxygen saturation probe site  4. Every 4-6 hours:  If on nasal continuous positive airway pressure, respiratory therapy assess nares and determine need for appliance change or resting period.   Outcome: Progressing     Problem: Safety - Adult  Goal: Free from fall injury  Outcome: Progressing     Problem: Nutrition Deficit:  Goal: Optimize nutritional status  Outcome: Progressing     Problem: Pain  Goal: Verbalizes/displays adequate comfort level or baseline comfort level  Outcome: Progressing     Problem: Metabolic/Fluid and Electrolytes - Adult  Goal: Electrolytes maintained within normal limits  Outcome: Progressing  Goal: Glucose maintained within prescribed range  Outcome: Progressing

## 2022-10-18 NOTE — PROGRESS NOTES
Patient 1:1 with this writer. Resting comfortably in bed with eyes closed. Aroused easily for assessment. Apnea observed while patient sleeping, BiPap placed on patient for comfort. Patient pleasant and cooperative with care. VS stable, will continue to monitor.

## 2022-10-18 NOTE — PROGRESS NOTES
Progress Note    SUBJECTIVE:    Patient seen for f/u of Hypernatremia. She resting in bed eyes closed with witnessed sleep apnea. Awakens only with painful stimuli. ROS: Unable due to altered mental status     All other systems were reviewed with the patient and are negative unless otherwise stated in HPI      OBJECTIVE:      Vitals:   Vitals:    10/18/22 0853   BP: 119/67   Pulse: 56   Resp: 14   Temp: (!) 96.6 °F (35.9 °C)   SpO2: 98%     Weight: 155 lb 14.4 oz (70.7 kg)   Height: 5' 6\" (167.6 cm)     Weight  Wt Readings from Last 3 Encounters:   10/18/22 155 lb 14.4 oz (70.7 kg)   04/22/21 180 lb (81.6 kg)     Body mass index is 25.16 kg/m². 24HR INTAKE/OUTPUT:      Intake/Output Summary (Last 24 hours) at 10/18/2022 0915  Last data filed at 10/18/2022 0536  Gross per 24 hour   Intake 2759 ml   Output 1875 ml   Net 884 ml     -----------------------------------------------------------------  Exam:    GEN:   eyes closed awakens with painful stimuli,   EYES:   EOMI, pupils equal   NECK: Supple. No lymphadenopathy. No carotid bruit  FACE/MOUTH:  left parotitis improving oral thrush improved but mucous membranes less dry  CVS:     regular rate and rhythm, no audible murmur  PULM:  CTA, no wheezes, rales or rhonchi, no acute respiratory distress  ABD:     Bowels sounds normal.  Abdomen is soft. No distention. no tenderness to palpation. EXT:     no edema bilaterally . No calf tenderness. NEURO: Moves all extremities. Motor and sensory are grossly intact. Visible tremors   SKIN:    No rashes.   No skin lesions    -----------------------------------------------------------------    Diagnostic Data:      Complete Blood Count:   Recent Labs     10/16/22  0830 10/17/22  0615 10/18/22  0620   WBC 7.5 8.3 7.5   RBC 4.23 4.23 4.23   HGB 12.9 12.4 12.4   HCT 38.2 37.9 37.3   MCV 90.3 89.6 88.2   MCH 30.5 29.3 29.3   MCHC 33.8 32.7 33.2   RDW 12.8 12.6 12.5   PLT See Reflexed IPF Result See Reflexed IPF Result See Reflexed IPF Result        Last 3 Blood Glucose:   Recent Labs     10/16/22  0830 10/17/22  0615 10/18/22  0620   GLUCOSE 219* 183* 214*        Comprehensive Metabolic Profile:   Recent Labs     10/16/22  0830 10/16/22  2106 10/17/22  0615 10/17/22  1726 10/18/22  0620   *   < > 145* 143 141   K 3.9  --  4.1  --  3.4*   *  --  110*  --  105   CO2 28  --  25  --  27   BUN 16  --  12  --  8   CREATININE 0.74  --  0.58  --  0.73   GLUCOSE 219*  --  183*  --  214*   CALCIUM 8.7  --  8.4*  --  8.3*    < > = values in this interval not displayed. Urinalysis:   Lab Results   Component Value Date/Time    NITRU NEGATIVE 10/11/2022 07:00 PM    COLORU Yellow 10/11/2022 07:00 PM    PHUR 6.0 10/11/2022 07:00 PM    WBCUA 2 TO 5 10/11/2022 07:00 PM    RBCUA 2 TO 5 10/11/2022 07:00 PM    MUCUS TRACE 10/11/2022 07:00 PM    BACTERIA 1+ 10/11/2022 07:00 PM    SPECGRAV 1.020 10/11/2022 07:00 PM    LEUKOCYTESUR NEGATIVE 10/11/2022 07:00 PM    UROBILINOGEN ELEVATED 10/11/2022 07:00 PM    BILIRUBINUR NEGATIVE 10/11/2022 07:00 PM    GLUCOSEU 3+ 10/11/2022 07:00 PM    KETUA NEGATIVE 10/11/2022 07:00 PM       HgBA1c:    Lab Results   Component Value Date/Time    LABA1C 6.8 10/11/2022 09:35 AM       Radiology/Imaging:  XR CHEST PORTABLE   Final Result   No acute cardiopulmonary process         CT Head W/O Contrast   Final Result   No acute intracranial abnormality. Prominent senescent changes. These appear more prominent than usually seen   in a patient of this age, but could be due to old injuries, various   medications, or history of substance abuse.                ASSESSMENT / PLAN:  Hypernatremia  Appreciate Nephrology  IVF  Stop normal saline  Trend labs-improving  Dehydration  IVF  Trend labs-improving  Possibly due to oversedation, decreased oral intake, psychotropic medications including other medications such as lactulose and Jardiance  Type 2 diabetes  POCT before meals and at bedtime  Insulin sliding scale  Hypoglycemia protocol  Hold Jardiance  Major depressive disorder with psychotic features  Stop Geodon   Continue Cogentin, Depakote, Risperdal, Trintellix, Ativan  Continue Oral Haldol  Hold Abilify  Bacteremia  Appreciate ID for ATB for DC   Stop Levaquin  Continue  Ancef  BC - Staph Aureus - awaiting denitrification and sensitivity  Thrush  IV Diflucan  Add Peridox  Constipation  Resolved  Continue Colace, Senokot  Witnessed ALEXX  Decreased responsiveness  Apneic  VBG  Start CPAP/BiPAP  Essential hypertension  Continue Lopressor  Nutrition status:   at risk for malnutrition  Dietician consult initiated  Hospital Prophylaxis:   DVT: Lovenox   Stress Ulcer: H2 Blocker   High risk medications: none   Disposition:    Discharge plan is - Return to [facility] when medically cleared  IV ATB through 11/13      ROB Iglesias - CNP , ROB, NP-C  Hospitalist Medicine        10/18/2022, 9:15 AM

## 2022-10-18 NOTE — PROGRESS NOTES
1:1 sitter and another staff assist had patient sit in wheelchair and went around the unit. Let patient sit by window for a little while. Assisted patient back into bed.

## 2022-10-18 NOTE — PROGRESS NOTES
Occupational Therapy  Facility/Department: [facility] AT THE [facility] MED SURG  Daily Treatment Note  NAME: Dayami Díaz  : 1955  MRN: [id number]    Date of Service: 10/18/2022    Discharge Recommendations:  Continue to assess pending progress, Subacute/Skilled Nursing Facility         Patient Diagnosis(es): The primary encounter diagnosis was Hypernatremia. Diagnoses of Dehydration and Altered mental status, unspecified altered mental status type were also pertinent to this visit. Assessment    Activity Tolerance: Patient limited by fatigue;Treatment limited secondary to decreased cognition  Discharge Recommendations: Continue to assess pending progress;Subacute/Skilled Nursing Facility      Plan   Occupational Therapy Plan  Times Per Week: 7x/week  Times Per Day: Once a day  Current Treatment Recommendations: Strengthening; Endurance training;Balance training;Patient/Caregiver education & training; Safety education & training;Self-Care / ADL     Restrictions  Restrictions/Precautions  Restrictions/Precautions: General Precautions; Fall Risk    Subjective   Subjective  Subjective: Pt lying awake in bed with STNA present. Pt agreed to participate in therapy session. Pain: Pt had no reports of pain. Objective    Vitals     Bed Mobility Training  Bed Mobility Training: Yes  Overall Level of Assistance: Minimum assistance;Assist X2  Interventions: Manual cues;Demonstration;Visual cues; Tactile cues  Supine to Sit: Minimum assistance;Assist X2  Sit to Supine: Minimum assistance;Assist X2  Scooting: Minimum assistance  Balance  Sitting: Impaired  Sitting - Static: Fair (occasional)  Sitting - Dynamic: Fair (occasional)  Standing: Impaired (HHA x 2 with Fair tolerance)  Standing - Static: Fair  Transfer Training  Transfer Training: Yes  Overall Level of Assistance: Minimum assistance;Assist X2;Contact-guard assistance  Sit to Stand: Contact-guard assistance;Minimum assistance;Assist X2  Stand to Sit: Minimum assistance;Assist X2;Contact-guard assistance     ADL  Feeding: Maximum assistance        Safety Devices  Type of Devices: Call light within reach; Left in bed;Bed alarm in place;Sitter present     Patient Education  Education Given To: Patient  Education Provided: Role of Therapy;Plan of Care;Transfer Training  Education Method: Verbal  Barriers to Learning: Cognition  Education Outcome: Continued education needed    Goals  Short Term Goals  Time Frame for Short Term Goals: 10 visits  Short Term Goal 1: Pt will complete AROM/AAROM to BUE as tolerated to maintain joint range of motion for improved participation in ADL and functional transfers. Short Term Goal 2: Pt will complete functional transfers during ADL tasks modA to improve safety and participation in ADL tasks. Short Term Goal 3: Pt will participate in bathing tasks while seated in chair or upright in bed with moderate verbal prompting and modA.        Therapy Time   Individual Concurrent Group Co-treatment   Time In 1301         Time Out 1324         Minutes 23                 TAVO De Santiago/SWETHA

## 2022-10-18 NOTE — PLAN OF CARE
Problem: Discharge Planning  Goal: Discharge to home or other facility with appropriate resources  10/18/2022 1844 by Sima Juarez RN  Outcome: Progressing  Flowsheets (Taken 10/18/2022 1844)  Discharge to home or other facility with appropriate resources: Identify barriers to discharge with patient and caregiver  10/18/2022 1816 by Jony Vaughan RN  Outcome: Progressing     Problem: ABCDS Injury Assessment  Goal: Absence of physical injury  10/18/2022 1844 by Sima Juarez RN  Outcome: Progressing  Flowsheets (Taken 10/18/2022 1844)  Absence of Physical Injury: Implement safety measures based on patient assessment  10/18/2022 1816 by Jony Vaughan RN  Outcome: Progressing     Problem: Skin/Tissue Integrity  Goal: Absence of new skin breakdown  Description: 1. Monitor for areas of redness and/or skin breakdown  2. Assess vascular access sites hourly  3. Every 4-6 hours minimum:  Change oxygen saturation probe site  4. Every 4-6 hours:  If on nasal continuous positive airway pressure, respiratory therapy assess nares and determine need for appliance change or resting period. 10/18/2022 1844 by Sima Juarez RN  Outcome: Progressing  Note: No new skin issues identified.   10/18/2022 1816 by Jony Vaughan RN  Outcome: Progressing     Problem: Safety - Adult  Goal: Free from fall injury  10/18/2022 1844 by Sima Juarez RN  Outcome: Progressing  Flowsheets (Taken 10/18/2022 1844)  Free From Fall Injury: Instruct family/caregiver on patient safety  10/18/2022 1816 by Jony Vaughan RN  Outcome: Progressing     Problem: Nutrition Deficit:  Goal: Optimize nutritional status  10/18/2022 1844 by Sima Juarez RN  Outcome: Progressing  Flowsheets (Taken 10/18/2022 1844)  Nutrient intake appropriate for improving, restoring, or maintaining nutritional needs: Monitor oral intake, labs, and treatment plans  10/18/2022 1816 by Jony Vaughan RN  Outcome: Progressing     Problem: Pain  Goal: Verbalizes/displays adequate comfort level or baseline comfort level  10/18/2022 1844 by Kyler Baig RN  Outcome: Progressing  Flowsheets (Taken 10/18/2022 1844)  Verbalizes/displays adequate comfort level or baseline comfort level:   Encourage patient to monitor pain and request assistance   Assess pain using appropriate pain scale   Administer analgesics based on type and severity of pain and evaluate response   Implement non-pharmacological measures as appropriate and evaluate response   Consider cultural and social influences on pain and pain management   Notify Licensed Independent Practitioner if interventions unsuccessful or patient reports new pain  10/18/2022 1816 by Aracely Kwok RN  Outcome: Progressing     Problem: Metabolic/Fluid and Electrolytes - Adult  Goal: Electrolytes maintained within normal limits  10/18/2022 1844 by Kyler Baig RN  Outcome: Progressing  Flowsheets (Taken 10/18/2022 1844)  Electrolytes maintained within normal limits:   Monitor labs and assess patient for signs and symptoms of electrolyte imbalances   Administer electrolyte replacement as ordered   Monitor response to electrolyte replacements, including repeat lab results as appropriate  10/18/2022 1816 by Aracely Kwok RN  Outcome: Progressing  Goal: Glucose maintained within prescribed range  10/18/2022 1844 by Kyler Baig RN  Outcome: Progressing  Flowsheets (Taken 10/18/2022 1844)  Glucose maintained within prescribed range:   Monitor blood glucose as ordered   Assess for signs and symptoms of hyperglycemia and hypoglycemia   Administer ordered medications to maintain glucose within target range  10/18/2022 1816 by Aracely Kwok RN  Outcome: Progressing

## 2022-10-18 NOTE — PLAN OF CARE
Problem: ABCDS Injury Assessment  Goal: Absence of physical injury  Outcome: Progressing  Note: Patient is encouraged to reposition and assessed for any injuries. Will continue to monitor this shift. Problem: Skin/Tissue Integrity  Goal: Absence of new skin breakdown  Description: 1. Monitor for areas of redness and/or skin breakdown  2. Assess vascular access sites hourly  3. Every 4-6 hours minimum:  Change oxygen saturation probe site  4. Every 4-6 hours:  If on nasal continuous positive airway pressure, respiratory therapy assess nares and determine need for appliance change or resting period. Outcome: Progressing  Note: Patient able to reposition self at this time. Will continue to monitor this shift      Problem: Safety - Adult  Goal: Free from fall injury  Outcome: Progressing  Note: Bed alarm on, bed locked, side rails up, gripper socks on, call light within reach and sitter at bedside. Will continue to monitor this shift. Problem: Nutrition Deficit:  Goal: Optimize nutritional status  10/17/2022 2309 by Ida Montilla RN  Outcome: Progressing  Flowsheets (Taken 10/17/2022 2309)  Nutrient intake appropriate for improving, restoring, or maintaining nutritional needs: Assess nutritional status and recommend course of action  Note: Encouraging foods and liquids as tolerated. Will continue to monitor this shift.       Problem: Pain  Goal: Verbalizes/displays adequate comfort level or baseline comfort level  Outcome: Progressing  Flowsheets  Taken 10/17/2022 2309  Verbalizes/displays adequate comfort level or baseline comfort level:   Encourage patient to monitor pain and request assistance   Assess pain using appropriate pain scale   Administer analgesics based on type and severity of pain and evaluate response   Implement non-pharmacological measures as appropriate and evaluate response  Taken 10/17/2022 4823  Verbalizes/displays adequate comfort level or baseline comfort level: Encourage patient to monitor pain and request assistance  Note: Patient does not appear in pain at this time. Will continue to monitor.      Problem: Metabolic/Fluid and Electrolytes - Adult  Goal: Electrolytes maintained within normal limits  Outcome: Progressing  Flowsheets  Taken 10/17/2022 2309  Electrolytes maintained within normal limits: Monitor labs and assess patient for signs and symptoms of electrolyte imbalances  Taken 10/17/2022 1836  Electrolytes maintained within normal limits: Monitor labs and assess patient for signs and symptoms of electrolyte imbalances     Problem: Metabolic/Fluid and Electrolytes - Adult  Goal: Glucose maintained within prescribed range  Outcome: Progressing  Flowsheets  Taken 10/17/2022 2309  Glucose maintained within prescribed range: Monitor blood glucose as ordered  Taken 10/17/2022 1836  Glucose maintained within prescribed range: Monitor blood glucose as ordered

## 2022-10-18 NOTE — PROGRESS NOTES
1:1 sitter called out stating patient was starting to wake up. Able to administer some medications before patient stops taking bites of pudding. BiPAP off at this time. When patient falls backs asleep, will place back on.

## 2022-10-19 ENCOUNTER — APPOINTMENT (OUTPATIENT)
Dept: GENERAL RADIOLOGY | Age: 67
DRG: 872 | End: 2022-10-19
Payer: MEDICARE

## 2022-10-19 PROBLEM — E87.6 HYPOKALEMIA: Status: ACTIVE | Noted: 2022-10-19

## 2022-10-19 LAB
ANION GAP SERPL CALCULATED.3IONS-SCNC: 9 MMOL/L (ref 9–17)
BUN BLDV-MCNC: 6 MG/DL (ref 8–23)
BUN/CREAT BLD: 8 (ref 9–20)
CALCIUM SERPL-MCNC: 8.7 MG/DL (ref 8.6–10.4)
CHLORIDE BLD-SCNC: 108 MMOL/L (ref 98–107)
CO2: 30 MMOL/L (ref 20–31)
CREAT SERPL-MCNC: 0.79 MG/DL (ref 0.5–0.9)
GFR SERPL CREATININE-BSD FRML MDRD: >60 ML/MIN/1.73M2
GLUCOSE BLD-MCNC: 160 MG/DL (ref 74–100)
GLUCOSE BLD-MCNC: 181 MG/DL (ref 74–100)
GLUCOSE BLD-MCNC: 185 MG/DL (ref 70–99)
GLUCOSE BLD-MCNC: 194 MG/DL (ref 74–100)
GLUCOSE BLD-MCNC: 219 MG/DL (ref 74–100)
POTASSIUM SERPL-SCNC: 4.6 MMOL/L (ref 3.7–5.3)
SODIUM BLD-SCNC: 147 MMOL/L (ref 135–144)

## 2022-10-19 PROCEDURE — 6360000002 HC RX W HCPCS: Performed by: NURSE PRACTITIONER

## 2022-10-19 PROCEDURE — 94761 N-INVAS EAR/PLS OXIMETRY MLT: CPT

## 2022-10-19 PROCEDURE — 6370000000 HC RX 637 (ALT 250 FOR IP): Performed by: NURSE PRACTITIONER

## 2022-10-19 PROCEDURE — 71046 X-RAY EXAM CHEST 2 VIEWS: CPT

## 2022-10-19 PROCEDURE — APPSS30 APP SPLIT SHARED TIME 16-30 MINUTES: Performed by: NURSE PRACTITIONER

## 2022-10-19 PROCEDURE — 99232 SBSQ HOSP IP/OBS MODERATE 35: CPT | Performed by: INTERNAL MEDICINE

## 2022-10-19 PROCEDURE — 80048 BASIC METABOLIC PNL TOTAL CA: CPT

## 2022-10-19 PROCEDURE — 97110 THERAPEUTIC EXERCISES: CPT

## 2022-10-19 PROCEDURE — 2580000003 HC RX 258: Performed by: FAMILY MEDICINE

## 2022-10-19 PROCEDURE — 6370000000 HC RX 637 (ALT 250 FOR IP): Performed by: FAMILY MEDICINE

## 2022-10-19 PROCEDURE — 36415 COLL VENOUS BLD VENIPUNCTURE: CPT

## 2022-10-19 PROCEDURE — 1200000000 HC SEMI PRIVATE

## 2022-10-19 PROCEDURE — 92610 EVALUATE SWALLOWING FUNCTION: CPT

## 2022-10-19 PROCEDURE — 97116 GAIT TRAINING THERAPY: CPT

## 2022-10-19 PROCEDURE — 82947 ASSAY GLUCOSE BLOOD QUANT: CPT

## 2022-10-19 RX ORDER — HALOPERIDOL 2 MG/1
2 TABLET ORAL 3 TIMES DAILY
Status: DISCONTINUED | OUTPATIENT
Start: 2022-10-19 | End: 2022-10-20

## 2022-10-19 RX ORDER — SODIUM CHLORIDE 9 MG/ML
25 INJECTION, SOLUTION INTRAVENOUS PRN
Status: DISCONTINUED | OUTPATIENT
Start: 2022-10-19 | End: 2022-10-24 | Stop reason: SDUPTHER

## 2022-10-19 RX ORDER — SODIUM CHLORIDE 0.9 % (FLUSH) 0.9 %
5-40 SYRINGE (ML) INJECTION PRN
Status: DISCONTINUED | OUTPATIENT
Start: 2022-10-19 | End: 2022-10-24 | Stop reason: SDUPTHER

## 2022-10-19 RX ORDER — RISPERIDONE 1 MG/1
1 TABLET ORAL 2 TIMES DAILY
Status: DISCONTINUED | OUTPATIENT
Start: 2022-10-19 | End: 2022-10-25

## 2022-10-19 RX ORDER — SODIUM CHLORIDE 0.9 % (FLUSH) 0.9 %
5-40 SYRINGE (ML) INJECTION EVERY 12 HOURS SCHEDULED
Status: DISCONTINUED | OUTPATIENT
Start: 2022-10-19 | End: 2022-10-28 | Stop reason: HOSPADM

## 2022-10-19 RX ORDER — LORAZEPAM 0.5 MG/1
0.5 TABLET ORAL EVERY 6 HOURS PRN
Status: DISCONTINUED | OUTPATIENT
Start: 2022-10-19 | End: 2022-10-25

## 2022-10-19 RX ORDER — LIDOCAINE HYDROCHLORIDE 10 MG/ML
5 INJECTION, SOLUTION INFILTRATION; PERINEURAL ONCE
Status: DISCONTINUED | OUTPATIENT
Start: 2022-10-19 | End: 2022-10-28 | Stop reason: HOSPADM

## 2022-10-19 RX ADMIN — NYSTATIN 500000 UNITS: 100000 SUSPENSION ORAL at 20:47

## 2022-10-19 RX ADMIN — PANTOPRAZOLE SODIUM 40 MG: 40 TABLET, DELAYED RELEASE ORAL at 08:44

## 2022-10-19 RX ADMIN — INSULIN LISPRO 2 UNITS: 100 INJECTION, SOLUTION INTRAVENOUS; SUBCUTANEOUS at 12:52

## 2022-10-19 RX ADMIN — PRAVASTATIN SODIUM 40 MG: 20 TABLET ORAL at 08:47

## 2022-10-19 RX ADMIN — POTASSIUM CHLORIDE 10 MEQ: 10 TABLET, EXTENDED RELEASE ORAL at 08:47

## 2022-10-19 RX ADMIN — LORAZEPAM 0.5 MG: 0.5 TABLET ORAL at 20:39

## 2022-10-19 RX ADMIN — FOLIC ACID 1 MG: 1 TABLET ORAL at 08:44

## 2022-10-19 RX ADMIN — DOCUSATE SODIUM 100 MG: 100 CAPSULE, LIQUID FILLED ORAL at 08:47

## 2022-10-19 RX ADMIN — CEFAZOLIN 2000 MG: 2 INJECTION, POWDER, FOR SOLUTION INTRAMUSCULAR; INTRAVENOUS at 20:20

## 2022-10-19 RX ADMIN — BENZTROPINE MESYLATE 1 MG: 1 TABLET ORAL at 20:47

## 2022-10-19 RX ADMIN — RISPERIDONE 1 MG: 1 TABLET, FILM COATED ORAL at 08:39

## 2022-10-19 RX ADMIN — CEFAZOLIN 2000 MG: 2 INJECTION, POWDER, FOR SOLUTION INTRAMUSCULAR; INTRAVENOUS at 12:52

## 2022-10-19 RX ADMIN — RISPERIDONE 1 MG: 1 TABLET, FILM COATED ORAL at 20:47

## 2022-10-19 RX ADMIN — BENZTROPINE MESYLATE 1 MG: 1 TABLET ORAL at 08:39

## 2022-10-19 RX ADMIN — METOPROLOL TARTRATE 50 MG: 50 TABLET, FILM COATED ORAL at 20:47

## 2022-10-19 RX ADMIN — THIAMINE HCL TAB 100 MG 100 MG: 100 TAB at 08:44

## 2022-10-19 RX ADMIN — NYSTATIN 500000 UNITS: 100000 SUSPENSION ORAL at 12:55

## 2022-10-19 RX ADMIN — DOCUSATE SODIUM 100 MG: 100 CAPSULE, LIQUID FILLED ORAL at 20:47

## 2022-10-19 RX ADMIN — SENNOSIDES 8.6 MG: 8.6 TABLET, FILM COATED ORAL at 20:47

## 2022-10-19 RX ADMIN — NYSTATIN 500000 UNITS: 100000 SUSPENSION ORAL at 17:11

## 2022-10-19 RX ADMIN — CHLORHEXIDINE GLUCONATE 0.12% ORAL RINSE 15 ML: 1.2 LIQUID ORAL at 08:38

## 2022-10-19 RX ADMIN — HALOPERIDOL 2 MG: 2 TABLET ORAL at 20:47

## 2022-10-19 RX ADMIN — HALOPERIDOL 2 MG: 2 TABLET ORAL at 08:39

## 2022-10-19 RX ADMIN — DIVALPROEX SODIUM 125 MG: 125 CAPSULE ORAL at 20:47

## 2022-10-19 RX ADMIN — ASPIRIN 81 MG: 81 TABLET, COATED ORAL at 08:44

## 2022-10-19 RX ADMIN — DIVALPROEX SODIUM 125 MG: 125 CAPSULE ORAL at 14:06

## 2022-10-19 RX ADMIN — CHLORHEXIDINE GLUCONATE 0.12% ORAL RINSE 15 ML: 1.2 LIQUID ORAL at 20:47

## 2022-10-19 RX ADMIN — NYSTATIN 500000 UNITS: 100000 SUSPENSION ORAL at 08:38

## 2022-10-19 RX ADMIN — HALOPERIDOL 2 MG: 2 TABLET ORAL at 14:06

## 2022-10-19 RX ADMIN — DIVALPROEX SODIUM 125 MG: 125 CAPSULE ORAL at 08:39

## 2022-10-19 RX ADMIN — SODIUM CHLORIDE, PRESERVATIVE FREE 10 ML: 5 INJECTION INTRAVENOUS at 20:49

## 2022-10-19 RX ADMIN — CEFAZOLIN 2000 MG: 2 INJECTION, POWDER, FOR SOLUTION INTRAMUSCULAR; INTRAVENOUS at 04:29

## 2022-10-19 NOTE — PROGRESS NOTES
Patient denied acceptance at [facility]. Referral made to [facility] for their review. Will follow and assist with discharge placement as appropriate.     [address] Carolyne [address]  10/19/2022

## 2022-10-19 NOTE — PLAN OF CARE
Problem: Discharge Planning  Goal: Discharge to home or other facility with appropriate resources  Outcome: Progressing     Problem: ABCDS Injury Assessment  Goal: Absence of physical injury  Outcome: Progressing  Flowsheets (Taken 10/19/2022 1855)  Absence of Physical Injury: Implement safety measures based on patient assessment     Problem: Skin/Tissue Integrity  Goal: Absence of new skin breakdown  Description: 1. Monitor for areas of redness and/or skin breakdown  2. Assess vascular access sites hourly  3. Every 4-6 hours minimum:  Change oxygen saturation probe site  4. Every 4-6 hours:  If on nasal continuous positive airway pressure, respiratory therapy assess nares and determine need for appliance change or resting period. Outcome: Progressing  Note: No skin breakdown observed.      Problem: Safety - Adult  Goal: Free from fall injury  Outcome: Progressing  Flowsheets (Taken 10/19/2022 1855)  Free From Fall Injury: Instruct family/caregiver on patient safety     Problem: Pain  Goal: Verbalizes/displays adequate comfort level or baseline comfort level  Outcome: Progressing  Flowsheets (Taken 10/19/2022 1855)  Verbalizes/displays adequate comfort level or baseline comfort level:   Encourage patient to monitor pain and request assistance   Assess pain using appropriate pain scale   Administer analgesics based on type and severity of pain and evaluate response   Implement non-pharmacological measures as appropriate and evaluate response   Consider cultural and social influences on pain and pain management   Notify Licensed Independent Practitioner if interventions unsuccessful or patient reports new pain     Problem: Metabolic/Fluid and Electrolytes - Adult  Goal: Electrolytes maintained within normal limits  Outcome: Progressing     Problem: Metabolic/Fluid and Electrolytes - Adult  Goal: Glucose maintained within prescribed range  Outcome: Progressing

## 2022-10-19 NOTE — PROGRESS NOTES
Fabby mendez Williamson in [facility] unable to accept Patient for admission. Referral made to [facility] in [facility] for their review.     [address] Carolyne Natarajan[address]  10/19/2022

## 2022-10-19 NOTE — PROGRESS NOTES
Facilitated video visit with Dr. Josi Diaz. He gave the okay for PICC placement. Orders received to increase IV fluids back to 100.

## 2022-10-19 NOTE — PROGRESS NOTES
Physical Therapy  Facility/Department: [facility] AT THE [facility] MED SURG  Daily Treatment Note  NAME: Ernesto Jay  : 1955  MRN: [id number]  Date of Service: 10/19/2022  Discharge Recommendations:  [address] [place]   Patient Diagnosis(es): The primary encounter diagnosis was Hypernatremia. Diagnoses of Dehydration and Altered mental status, unspecified altered mental status type were also pertinent to this visit. Assessment   Assessment: Gait 5ftx1 with HHA x2, MIn A x2. Pt. has decreased stride length with shuffled gait pattern. Seated exercises B LE x15 PROM/AAROM. Bed mobility and transfers:CGA/Min A x2  Activity Tolerance: Patient limited by fatigue;Treatment limited secondary to decreased cognition;Patient tolerated treatment well   Plan    Physcial Therapy Plan  General Plan: 2 times a day 7 days a week  Current Treatment Recommendations: Strengthening;ROM;Balance training;Functional mobility training;Transfer training;ADL/Self-care training;Neuromuscular re-education;Gait training; Safety education & training;Patient/Caregiver education & training   Restrictions  Restrictions/Precautions  Restrictions/Precautions: General Precautions, Fall Risk  Required Braces or Orthoses?: No   Subjective    Subjective  Subjective: Pt. in bed upon arrival and awake, spoke with PCT and agreeable to get pt. up to chair at this time. Pain: denied  Orientation  Overall Orientation Status: Impaired   Objective   Bed Mobility Training  Bed Mobility Training: Yes  Overall Level of Assistance: Minimum assistance;Assist X2;Contact-guard assistance  Interventions: Manual cues;Demonstration;Visual cues; Tactile cues  Rolling: Assist X2;Contact-guard assistance;Minimum assistance  Supine to Sit: Minimum assistance;Assist X2;Contact-guard assistance  Sit to Supine: Minimum assistance;Assist X2;Contact-guard assistance  Scooting: Minimum assistance;Contact-guard assistance;Assist X1  Transfer Training  Transfer Training: Yes  Overall Level of Assistance: Minimum assistance;Assist X2;Contact-guard assistance  Interventions: Demonstration;Verbal cues; Tactile cues; Visual cues  Sit to Stand: Contact-guard assistance;Minimum assistance;Assist X2  Stand to Sit: Minimum assistance;Assist X2;Contact-guard assistance  Gait Training  Gait Training: Yes  Gait  Overall Level of Assistance: Minimum assistance;Assist X2  Interventions: Verbal cues  Speed/Velia: Slow  Step Length: Left shortened;Right shortened  Distance (ft):  (5ftx1)  Assistive Device: Other (comment) (HHA x2)  PT Exercises  Exercise Treatment: PROM/AAROM to B LE sx15  Safety Devices  Type of Devices: Call light within reach; Sitter present; Left in chair;Chair alarm in place   Goals  Short Term Goals  Time Frame for Short Term Goals: 3 DAYS  Short Term Goal 1: MOD ASSIST BED MOBILITY  Short Term Goal 2: MIN ASSIST TRANSFERS. Long Term Goals  Time Frame for Long Term Goals : 4 WEEKS  Long Term Goal 1: MIN ASSIST TRANSFERS AND MIN ASSIST BED MOBILITY. Patient Goals   Patient Goals : UNABLE TO ASCERTAIN DUE TO IMPAIRED COGNITION AND LETHARGY. Education  Patient Education  Education Given To: Patient  Education Provided: Transfer Training  Education Provided Comments: Cues for technique with bed mobility, side stepping and transfers to optimize safety--poor+ to fair- correction noted, limited by cognition.   Education Method: Verbal;Demonstration  Barriers to Learning: Cognition  Education Outcome: Continued education needed  Therapy Time   Individual Concurrent Group Co-treatment   Time In 0902         Time Out 0926         Minutes [address] [place], [place]

## 2022-10-19 NOTE — PROGRESS NOTES
[facility] - Progress Note  - Telemedicine  Today's Date and Time: 10/19/2022, 10:47 AM    Impression :   MSSA septicemia 10-11-22 x 2  Lt parotitis from dehydration and MSSA  Dehydration  Hypernatremia  Altered mental status  SHAINA- Resolved  Cirrhosis of the liver  Major depression with psychotic features  Oral candidiasis    Patient evaluated by Telemedicine. [facility]  Provider Institution: [address] [place] [address],[address] at [facility]: Ms Kwon Esther, APRN- IM    Recommendations:   Repeat blood cultures 10-18-22  Continue cefazolin 2 gm IV q 8 hr. Stop date 11-13-22  Alternatively she could receive Ceftriaxone 2 gm IV q 24 hr until 11-13-22, if the facility where she is going can not accommodate IV infusions 3 times daily. Repeat blood cultures x 2 on 11-18-22 to document clearing of septicemia  Oral nystatin  Medical Decision Making/Summary/Discussion:10/19/2022       Infection Control Recommendations   [place] Precautions    Antimicrobial Stewardship Recommendations     Simplification of therapy  Targeted therapy    Coordination of Outpatient Care:   Estimated Length of IV antimicrobials:11-13-22  Patient will need Midline Catheter Insertion: yes  Patient will need PICC line Insertion:no  Patient will need: Home IV , [place],  SNF,  LTAC:  Yes  Patient will need outpatient wound care:No    Chief complaint/reason for consultation:   MSSA septicemia      History of Present Illness:   Meredith Odonnell is a 74y.o.-year-old  female who was initially admitted on 10/11/2022. Patient seen at the request of     INITIAL HISTORY:     Patient was transferred to [facility] fro [place] on 10-11-22 because of altered mental status. She was found to be severely hypernatremic with a Na of 161, dehydrated and in SHAINA. She has received hydration with improvement. Has been evaluated by Nephrology. She has a past Hx of ETOH intake, cirrhosis of the liver with ascites, CAD, essential HTN, DM 2, GERD, bladder incontinence. ID service asked to evaluate because of S aureus septicemia on 10-13-22. CURRENT EVALUATION :10/19/2022  BP (!) 91/57   Pulse 56   Temp 97 °F (36.1 °C) (Temporal)   Resp 18   Ht 5' 6\" (1.676 m)   Wt 155 lb 9.6 oz (70.6 kg)   SpO2 95%   BMI 25.11 kg/m²     Afebrile  VS stable. BP fluctuates in lower range    The patient is stable on room air. No acute issues noted    There is no growth thus far on repeat blood cultures from 10/18/22  She is receiving a midline today for outpatient antibiotics    Discharge planning underway    Labs, X rays reviewed: 10/19/2022    BUN: 12-->8-->6  Cr:0.58-->0.73-->0.79  Na 145-->145-->147  K 4.1-->3.4-->4.6    WBC: 8.3-->7.5  Hb:12.4  Plat: 80-->97    Cultures:  Urine:    Blood:  10-11-22: MSSA x 2  10-18-22: No growth thus far  Sputum :    Wound:      Discussed with VICK RN. I have personally reviewed the past medical history, past surgical history, medications, social history, and family history, and I have updated the database accordingly. Past Medical History:     Past Medical History:   Diagnosis Date    Alcohol dependence in remission [facility])     Alcoholic cirrhosis of liver without ascites (HCC)     ASHD (arteriosclerotic heart disease)     Bacteremia due to Staphylococcus aureus 10/13/2022    Depression     Diabetes mellitus (HCC)     GERD (gastroesophageal reflux disease)     Hepatitis C     WITH FULL RECOVERY - INFECTIOUS DISEASE DR. Gene Moralez - LAST VISIT 7/2020    Hyperlipidemia     Hypertension     Irregular heartbeat     Murmur, cardiac     noted by PCP, no echo per patient    Urinary bladder incontinence     Wears eyeglasses     Well adult health check     PCP - DR. Sarita Eugene - 3/2021    Well adult health check     INFECTIOUS DISEASE - DR. Karin Pryor    Well adult health check     GI - DR. Julisa LEONARD       Past Surgical  History:     Past Surgical History:   Procedure Laterality Date    BREAST SURGERY Right     REMOVAL BENIGN TUMOR    COLONOSCOPY      HIP SURGERY Right     RECONSTRUCTION    TONSILLECTOMY      AS A CHILD    TUBAL LIGATION  1975    VITRECTOMY Left 2021    VITRECTOMY Left 2021    VITRECTOMY 25 GAUGE, MEMBRANE PEEL, ICG performed by Antonieta Clark MD at [facility] OR       Medications:      haloperidol  2 mg Oral TID    risperiDONE  1 mg Oral BID    docusate sodium  100 mg Oral BID    senna  1 tablet Oral Nightly    chlorhexidine  15 mL Mouth/Throat BID    nystatin  5 mL Oral 4x Daily    ceFAZolin  2,000 mg IntraVENous Q8H    insulin lispro  0-8 Units SubCUTAneous TID WC    insulin lispro  0-4 Units SubCUTAneous Nightly    aspirin  81 mg Oral Daily    benztropine  1 mg Oral BID    divalproex  125 mg Oral TID    folic acid  1 mg Oral Daily    metoprolol tartrate  50 mg Oral BID    pantoprazole  40 mg Oral QAM AC    potassium chloride  10 mEq Oral Daily    pravastatin  40 mg Oral Daily    vitamin B-1  100 mg Oral Daily    VORTIoxetine HBr  20 mg Oral Daily    sodium chloride flush  5-40 mL IntraVENous 2 times per day       Social History:     Social History     Socioeconomic History    Marital status: Single     Spouse name: Not on file    Number of children: Not on file    Years of education: Not on file    Highest education level: Not on file   Occupational History    Not on file   Tobacco Use    Smoking status: Former     Types: Cigarettes     Quit date:      Years since quittin.8    Smokeless tobacco: Never   Vaping Use    Vaping Use: Never used   Substance and Sexual Activity    Alcohol use: Not on file    Drug use: Not on file    Sexual activity: Not on file   Other Topics Concern    Not on file   Social History Narrative    Not on file     Social Determinants of Health     Financial Resource Strain: Not on file   Food Insecurity: Not on file Transportation Needs: Not on file   Physical Activity: Not on file   Stress: Not on file   Social Connections: Not on file   Intimate Partner Violence: Not on file   Housing Stability: Not on file       Family History:   History reviewed. No pertinent family history. Allergies:   Lisinopril     Review of Systems:     Patient unable to provide ROS. Confused and agitated      Constitutional: No fevers or chills. No systemic complaints  Head: No headaches  Eyes: No double vision or blurry vision. No conjunctival inflammation. ENT: No sore throat or runny nose. . No hearing loss, tinnitus or vertigo. Cardiovascular: No chest pain or palpitations. No shortness of breath. No TESFAYE  Lung: No shortness of breath or cough. No sputum production  Abdomen: No nausea, vomiting, diarrhea, or abdominal pain. Lesia Leaf No cramps. Genitourinary: No increased urinary frequency, or dysuria. No hematuria. No suprapubic or CVA pain  Musculoskeletal: No muscle aches or pains. No joint effusions, swelling or deformities  Hematologic: No bleeding or bruising. Neurologic: No headache, weakness, numbness, or tingling. Integument: No rash, no ulcers. Psychiatric: No depression. Endocrine: No polyuria, no polydipsia, no polyphagia. Physical Examination :   Patient Vitals for the past 8 hrs:   BP Temp Temp src Pulse Resp SpO2 Weight   10/19/22 0646 (!) 91/57 97 °F (36.1 °C) Temporal 56 18 95 % --   10/19/22 0555 -- -- -- -- -- -- 155 lb 9.6 oz (70.6 kg)   10/19/22 0400 -- -- -- 59 -- -- --   10/19/22 0300 -- -- -- 59 -- -- --     General Appearance: Awake, alert, and in no apparent distress  Head:  Normocephalic, no trauma  Eyes: Pupils equal, round, reactive to light and accommodation; extraocular movements intact; sclera anicteric; conjunctivae pink. No embolic phenomena. ENT: Oropharynx clear, without erythema, exudate, or thrush. No tenderness of sinuses. Mouth/throat: mucosa pink and moist. No lesions.  Lt side parotitis   Neck:Supple, without lymphadenopathy. Thyroid normal, No bruits. Pulmonary/Chest: Clear to auscultation, without wheezes, rales, or rhonchi. No dullness to percussion. Cardiovascular: Regular rate and rhythm without murmurs, rubs, or gallops. Abdomen: Soft, non tender. Bowel sounds normal. No organomegaly. Ascites. All four Extremities: No cyanosis, clubbing, edema, or effusions. Neurologic: No gross sensory or motor deficits. Confused and agitated . Has tremors  Skin: Warm and dry with good turgor. No signs of peripheral arterial or venous insufficiency. No ulcerations. No open wounds. Medical Decision Making -Laboratory:   I have independently reviewed/ordered the following labs:    CBC with Differential:   Recent Labs     10/17/22  0615 10/18/22  0620   WBC 8.3 7.5   HGB 12.4 12.4   HCT 37.9 37.3   PLT See Reflexed IPF Result See Reflexed IPF Result   LYMPHOPCT 22* 21*   MONOPCT 10 11     BMP:   Recent Labs     10/18/22  0620 10/19/22  0550    147*   K 3.4* 4.6    108*   CO2 27 30   BUN 8 6*   CREATININE 0.73 0.79   MG 2.1  --      Hepatic Function Panel: No results for input(s): PROT, LABALBU, BILIDIR, IBILI, BILITOT, ALKPHOS, ALT, AST in the last 72 hours. No results for input(s): RPR in the last 72 hours. No results for input(s): HIV in the last 72 hours. No results for input(s): BC in the last 72 hours. Lab Results   Component Value Date/Time    MUCUS TRACE 10/11/2022 07:00 PM    RBC 4.23 10/18/2022 06:20 AM    WBC 7.5 10/18/2022 06:20 AM    TURBIDITY Clear 10/11/2022 07:00 PM     Lab Results   Component Value Date/Time    CREATININE 0.79 10/19/2022 05:50 AM    GLUCOSE 185 10/19/2022 05:50 AM       Medical Decision Making-Imaging:     EXAMINATION:   ONE XRAY VIEW OF THE CHEST       10/11/2022 10:30 am       COMPARISON:   None.        HISTORY:   ORDERING SYSTEM PROVIDED HISTORY: altered mental status   TECHNOLOGIST PROVIDED HISTORY:   altered mental status       FINDINGS:   The heart is not enlarged. No pulmonary venous congestion or edema. No   convincing lung consolidation or infiltrate. No pleural effusion or   pneumothorax. Osseous structures grossly intact. Impression   No acute cardiopulmonary process     Medical Decision [id number]:       Medical Decision Making-Other:     Note:  Labs, medications, radiologic studies were reviewed with personal review of films  Large amounts of data were reviewed  Discussed with nursing Staff, Discharge planner  Infection Control and Prevention measures reviewed  All prior entries were reviewed  Administer medications as ordered  Prognosis: Guarded  Discharge planning reviewed  Follow up as outpatient. Thank you for allowing us to participate in the care of this patient. Please call with questions. Aishwarya Funez, APRN - CNP    ATTESTATION:    I have discussed the case, including pertinent history and exam findings with the APRN. I have evaluated the  History, physical findings and pictures of the patient and the key elements of the encounter have been performed by me. I have reviewed the laboratory data, other diagnostic studies and discussed them with the APRN. I have updated the medical record where necessary. I agree with the assessment, plan and orders as documented by the APRN.     Yuli Cornejo MD.    Pager: [phone] - Office: [phone] Clear

## 2022-10-19 NOTE — PROGRESS NOTES
Progress Note    SUBJECTIVE:    Patient seen for f/u of Hypernatremia. She resting in bed eyes closed on Bipap. Staff reported pocketing food today. ROS: Unable due to altered mental status     All other systems were reviewed with the patient and are negative unless otherwise stated in HPI      OBJECTIVE:      Vitals:   Vitals:    10/19/22 0646   BP: (!) 91/57   Pulse: 56   Resp: 18   Temp: 97 °F (36.1 °C)   SpO2: 95%     Weight: 155 lb 9.6 oz (70.6 kg)   Height: 5' 6\" (167.6 cm)     Weight  Wt Readings from Last 3 Encounters:   10/19/22 155 lb 9.6 oz (70.6 kg)   04/22/21 180 lb (81.6 kg)     Body mass index is 25.11 kg/m². 24HR INTAKE/OUTPUT:      Intake/Output Summary (Last 24 hours) at 10/19/2022 0807  Last data filed at 10/19/2022 0610  Gross per 24 hour   Intake 240 ml   Output 2450 ml   Net -2210 ml     -----------------------------------------------------------------  Exam:    GEN:   eyes closed awakens with painful stimuli, on bipap  EYES:   EOMI, pupils equal   NECK: Supple. No lymphadenopathy. No carotid bruit  FACE/MOUTH:  left parotitis improving oral thrush improved but mucous membranes less dry  CVS:     regular rate and rhythm, no audible murmur  PULM:  CTA, no wheezes, rales or rhonchi, no acute respiratory distress  ABD:     Bowels sounds normal.  Abdomen is soft. No distention. no tenderness to palpation. EXT:     no edema bilaterally . No calf tenderness. NEURO: Moves all extremities. Motor and sensory are grossly intact. Visible tremors   SKIN:    No rashes.   No skin lesions    -----------------------------------------------------------------    Diagnostic Data:      Complete Blood Count:   Recent Labs     10/16/22  0830 10/17/22  0615 10/18/22  0620   WBC 7.5 8.3 7.5   RBC 4.23 4.23 4.23   HGB 12.9 12.4 12.4   HCT 38.2 37.9 37.3   MCV 90.3 89.6 88.2   MCH 30.5 29.3 29.3   MCHC 33.8 32.7 33.2   RDW 12.8 12.6 12.5   PLT See Reflexed IPF Result See Reflexed IPF Result See Reflexed IPF Result        Last 3 Blood Glucose:   Recent Labs     10/16/22  0830 10/17/22  0615 10/18/22  0620 10/19/22  0550   GLUCOSE 219* 183* 214* 185*        Comprehensive Metabolic Profile:   Recent Labs     10/17/22  0615 10/17/22  1726 10/18/22  0620 10/19/22  0550   * 143 141 147*   K 4.1  --  3.4* 4.6   *  --  105 108*   CO2 25  --  27 30   BUN 12  --  8 6*   CREATININE 0.58  --  0.73 0.79   GLUCOSE 183*  --  214* 185*   CALCIUM 8.4*  --  8.3* 8.7        Urinalysis:   Lab Results   Component Value Date/Time    NITRU NEGATIVE 10/11/2022 07:00 PM    COLORU Yellow 10/11/2022 07:00 PM    PHUR 6.0 10/11/2022 07:00 PM    WBCUA 2 TO 5 10/11/2022 07:00 PM    RBCUA 2 TO 5 10/11/2022 07:00 PM    MUCUS TRACE 10/11/2022 07:00 PM    BACTERIA 1+ 10/11/2022 07:00 PM    SPECGRAV 1.020 10/11/2022 07:00 PM    LEUKOCYTESUR NEGATIVE 10/11/2022 07:00 PM    UROBILINOGEN ELEVATED 10/11/2022 07:00 PM    BILIRUBINUR NEGATIVE 10/11/2022 07:00 PM    GLUCOSEU 3+ 10/11/2022 07:00 PM    KETUA NEGATIVE 10/11/2022 07:00 PM       HgBA1c:    Lab Results   Component Value Date/Time    LABA1C 6.8 10/11/2022 09:35 AM       Radiology/Imaging:  XR CHEST PORTABLE   Final Result   No acute cardiopulmonary process         CT Head W/O Contrast   Final Result   No acute intracranial abnormality. Prominent senescent changes. These appear more prominent than usually seen   in a patient of this age, but could be due to old injuries, various   medications, or history of substance abuse.          XR CHEST (2 VW)    (Results Pending)         ASSESSMENT / PLAN:  Hypernatremia  Appreciate Nephrology  IVF  Stop normal saline  Trend labs-improving  Dehydration  IVF  Trend labs-improving  Possibly due to oversedation, decreased oral intake, psychotropic medications including other medications such as lactulose and Jardiance  Type 2 diabetes  POCT before meals and at bedtime  Insulin sliding scale  Hypoglycemia protocol  Hold Jardiance  Major depressive disorder with psychotic features  Stop Geodon   Continue Cogentin, Depakote, Trintellix,   Decrease Risperdal to 1 mg bid  Decrease oral Haldol to 2 mg tid  Decrease Ativan  Hold Abilify  Bacteremia  Appreciate ID for ATB for DC   Stop Levaquin  Continue  Ancef  BC - Staph Aureus - awaiting denitrification and sensitivity  Thrush  IV Diflucan  Add Peridox  Constipation  Resolved  Continue Colace, Senokot  Witnessed ALEXX  Decreased responsiveness  Decreased doses of Haldol, Ativan and Risperodol  Apneic  Trend VBG  Continue CPAP/BiPAP  Chest Xray today  Essential hypertension  Continue Lopressor  Nutrition status:   at risk for malnutrition  Dietician consult initiated  Hospital Prophylaxis:   DVT: Lovenox   Stress Ulcer: H2 Blocker   High risk medications: none   Disposition:    Discharge plan is - Return to [address] [place] [address] when medically cleared  IV ATB through 11/13      ROB Cotton - CNP , APRJAZZY, NP-C  Hospitalist Medicine        10/19/2022, 8:07 AM

## 2022-10-19 NOTE — PROGRESS NOTES
Long periods of apnea noted while patient sleeping. BiPAP placed back on at this time. 1:1 sitter remains.

## 2022-10-19 NOTE — PROGRESS NOTES
PRN Ativan effective, patient resting comfortably in bed with eyes closed. Breathing unlabored, lung sounds clear upon auscultation although diminished in lower lobes. SPO3 97% on room air.

## 2022-10-19 NOTE — PROGRESS NOTES
[facility]   Nephrology progress note  10/19/2022, 10:21 AM      Pt Name:    Dayami Díaz  MRN:     [id number]     YOB: 1955  Admit Date:    10/11/2022  9:22 AM    TELEHEALTH EVALUATION -- Audio/Visual (During [address] emergency)     Telehealth service was provided with the patient at her  room [address], [facility] and myself the physician in my office in [address] [facility] and the [place] [address] who has initiated the visit. Pursuant to the emergency declaration under the [address] [place] [address] waiver authority and the [employer] Act, this Virtual  Visit was conducted, with patient's consent, to reduce the patient's risk of exposure to COVID-19 and provide continuity of care for an established patient. Services were provided through a video synchronous discussion virtually to substitute for in-person clinic visit. Chief Complaint: Nephrology following for acute kidney injury and hypernatremia. Subjective:  Patient seen and examined  Not much communicative today. She is slightly better than yesterday  Not eating or drinking well not taking any medications  Currently on a BiPAP    Objective:  24HR INTAKE/OUTPUT:    Intake/Output Summary (Last 24 hours) at 10/19/2022 1021  Last data filed at 10/19/2022 0953  Gross per 24 hour   Intake 340 ml   Output 2450 ml   Net -2110 ml        Admission weight: 151 lb 6.4 oz (68.7 kg)  Wt Readings from Last 3 Encounters:   10/19/22 155 lb 9.6 oz (70.6 kg)   04/22/21 180 lb (81.6 kg)        Vitals :   Vitals:    10/19/22 0300 10/19/22 0400 10/19/22 0555 10/19/22 0646   BP:    (!) 91/57   Pulse: 59 59  56   Resp:    18   Temp:    97 °F (36.1 °C)   TempSrc:    Temporal   SpO2:    95%   Weight:   155 lb 9.6 oz (70.6 kg)    Height:           Physical examination-limited due to being a televisit  General Appearance:  Well developed.  No distress  Mouth/Throat: Oral mucosa dry  Neck: No accessory muscle use  CNS: Not much communicative to verbal stimuli  Musculoskeletal: Trace edema  Psych-not agitated    Medications:  Infusion:    dextrose 50 mL/hr at 10/18/22 1112    dextrose      sodium chloride       Meds:    haloperidol  2 mg Oral TID    risperiDONE  1 mg Oral BID    docusate sodium  100 mg Oral BID    senna  1 tablet Oral Nightly    chlorhexidine  15 mL Mouth/Throat BID    nystatin  5 mL Oral 4x Daily    ceFAZolin  2,000 mg IntraVENous Q8H    insulin lispro  0-8 Units SubCUTAneous TID WC    insulin lispro  0-4 Units SubCUTAneous Nightly    aspirin  81 mg Oral Daily    benztropine  1 mg Oral BID    divalproex  125 mg Oral TID    folic acid  1 mg Oral Daily    metoprolol tartrate  50 mg Oral BID    pantoprazole  40 mg Oral QAM AC    potassium chloride  10 mEq Oral Daily    pravastatin  40 mg Oral Daily    vitamin B-1  100 mg Oral Daily    VORTIoxetine HBr  20 mg Oral Daily    sodium chloride flush  5-40 mL IntraVENous 2 times per day       Lab Data :  CBC:   Recent Labs     10/17/22  0615 10/18/22  0620   WBC 8.3 7.5   HGB 12.4 12.4   HCT 37.9 37.3   PLT See Reflexed IPF Result See Reflexed IPF Result       CMP:  Recent Labs     10/17/22  0615 10/17/22  1726 10/18/22  0620 10/19/22  0550   * 143 141 147*   K 4.1  --  3.4* 4.6   *  --  105 108*   CO2 25  --  27 30   BUN 12  --  8 6*   CREATININE 0.58  --  0.73 0.79   GLUCOSE 183*  --  214* 185*   CALCIUM 8.4*  --  8.3* 8.7   MG  --   --  2.1  --        Hepatic:   No results for input(s): LABALBU, AST, ALT, ALB, BILITOT, ALKPHOS in the last 72 hours. Assessment and Plan:  Renal -acute kidney injury most likely secondary to volume depletion  Resolved with IV fluids now back to baseline in fact better than baseline  Electrolytes -hypernatremia secondary to intracellular volume depletion overall much improved sodium is down to 141.   So decrease the D5W to 50 mill per hour and now the sodium started to get worse again. So we will increase the D5W to 100 mL/h  Hypokalemia status post replacement doing well  Acid-base status appears to be stable  Hx of diabetes mellitus  Major depressive disorder with psychotic features  Essential hypertension stable  Meds reviewed and discussed with nursing staff     Elaine Power MD  [facility]    This report has been created using voice recognition software.  It may contain minor errors which are inherent in voice recognition technology

## 2022-10-19 NOTE — PROGRESS NOTES
Obtained oknaima from Dr. Rosa Santoro and ID for line placement. Updated Janes Board. Julieth Sloan from [facility] stated he would be here around 1145.

## 2022-10-19 NOTE — PROGRESS NOTES
Patient anxious, restless, trying to climb out of bed. Has no safety awareness, pushing self down to the bottom of the bed, trying to climb over the rails. Patient repositioned, reoriented, 1:1 provided, offered food and snack. Patient continues to try to climb out of the bed. Continues with  for safety. Will continue to monitor.

## 2022-10-19 NOTE — PROGRESS NOTES
Progress Note  Florian Rondon MD    OBJECTIVE:    Patient seen for f/u of Hypernatremia. She is on bipap     ROS:   Constitutional: negative  for fevers, and negative for chills. Respiratory: positive for shortness of breath, negative for cough, and negative for wheezing  Cardiovascular: negative for chest pain, and negative for palpitations  Gastrointestinal: negative for abdominal pain, negative for nausea,negative for vomiting, negative for diarrhea, and negative for constipation     All other systems were reviewed with the patient and are negative unless otherwise stated in HPI    OBJECTIVE:  Vitals:   Temp: 97.4 °F (36.3 °C)  BP: 105/66  Resp: 18  Heart Rate: 71  SpO2: 95 %    24HR INTAKE/OUTPUT:    Intake/Output Summary (Last 24 hours) at 10/19/2022 1735  Last data filed at 10/19/2022 1732  Gross per 24 hour   Intake 700 ml   Output 1975 ml   Net -1275 ml     -----------------------------------------------------------------  Exam:  GEN:    Awake, alert and oriented x3. EYES:  EOMI, pupils equal   NECK: Supple. No lymphadenopathy. No carotid bruit  CVS:    regular rate and rhythm, no audible murmur  PULM:  CTA, no wheezes, rales or rhonchi, no acute respiratory distress  ABD:    Bowels sounds normal.  Abdomen is soft. No distention. no tenderness to palpation. EXT:   no edema bilaterally . No calf tenderness. NEURO: Moves all extremities. Motor and sensory are grossly intact  SKIN:  No rashes.   No skin lesions.    -----------------------------------------------------------------  Diagnostic Data:    All available data reviewed  Lab Results   Component Value Date    WBC 7.5 10/18/2022    HGB 12.4 10/18/2022    MCV 88.2 10/18/2022    PLT See Reflexed IPF Result 10/18/2022      Lab Results   Component Value Date    GLUCOSE 185 (H) 10/19/2022    BUN 6 (L) 10/19/2022    CREATININE 0.79 10/19/2022     (H) 10/19/2022    K 4.6 10/19/2022    CALCIUM 8.7 10/19/2022     (H) 10/19/2022    CO2 30 10/19/2022       PROBLEM LIST:  Principal Problem:    Hypernatremia  Active Problems:    Dehydration    Major depressive disorder with psychotic features ([facility]    Type 2 diabetes mellitus without complication (HCC)    Essential hypertension    ASHD (arteriosclerotic heart disease)    Gastroesophageal reflux disease without esophagitis    Bacteremia due to Staphylococcus aureus    MSSA (methicillin susceptible Staphylococcus aureus) septicemia (HCC)    Parotitis, acute    SHAINA (acute kidney injury) ([facility]    Alcoholic cirrhosis of liver with ascites (HCC)    Major depressive disorder    ALEXX (obstructive sleep apnea)-witnessed    Hypokalemia  Resolved Problems:    * No resolved hospital problems. *      ASSESSMENT / PLAN:  Hypernatremia  Principal Problem:    Hypernatremia-improving  Active Problems:    Dehydration    Major depressive disorder with psychotic features (HCC)    Type 2 diabetes mellitus without complication (HCC)    Essential hypertension    ASHD (arteriosclerotic heart disease)    Gastroesophageal reflux disease without esophagitis    Bacteremia due to Staphylococcus - continue iv antibiotics    MSSA (methicillin susceptible Staphylococcus aureus) septicemia (HCC)    Parotitis, acute    SHAINA (acute kidney injury) ([facility]    Alcoholic cirrhosis of liver with ascites (HCC)    Major depressive disorder    ALEXX (obstructive sleep apnea)-witnessed- bipap,reduce psychotropics    Hypokalemia  Resolved Problems:    * No resolved hospital problems.  *        Nutrition status: at risk for malnutrition  DVT prophylaxis: Lovenox   High risk medications: none   Disposition:  Discharge plan is ECF    Raulito Padilla MD , M.D.  10/19/2022  5:35 PM

## 2022-10-19 NOTE — PROGRESS NOTES
Patient becoming anxious and trying to climb out of the bed,  trying to remove pulse oximeter. Ripping off her blankets. Able to redirect at this time. Providing 1:1 care, offered snacks (pudding) which she ate. Will continue to monitor.

## 2022-10-19 NOTE — PROGRESS NOTES
[facility]  Inpatient/Observation/Outpatient Rehabilitation    Date: 10/19/2022  Patient Name: Marino Segura       [x] Inpatient Acute/Observation       []  Outpatient  : 1955       [] Pt no showed for scheduled appointment    [] Pt refused/declined therapy at this time due to:           [] Pt cancelled due to:  [] No Reason Given   [] Sick/ill   [x] Other:    Pt sleeping in chair upon arrival and PCT reports pt difficult awaken at this time. Therapist/Assistant will attempt to see this patient, at our earliest opportunity.        TAVO De Santiago/SWETHA Date: 10/19/2022

## 2022-10-19 NOTE — PROGRESS NOTES
Physical Therapy  Facility/Department: [facility] AT THE [facility] MED SURG  Daily Treatment Note  NAME: Ernesto Jay  : 1955  MRN: [id number]  Date of Service: 10/19/2022  Discharge Recommendations:  [address]   Patient Diagnosis(es): The primary encounter diagnosis was Hypernatremia. Diagnoses of Dehydration and Altered mental status, unspecified altered mental status type were also pertinent to this visit. Assessment   Assessment: Gait 5ftx1 with HHA x2, MIn A x2. Pt. has decreased stride length with shuffled gait pattern. Bed mobility and transfers:CGA/Min A x2  Activity Tolerance: Patient limited by fatigue;Treatment limited secondary to decreased cognition;Patient tolerated treatment well   Plan    Physcial Therapy Plan  General Plan: 2 times a day 7 days a week  Current Treatment Recommendations: Strengthening;ROM;Balance training;Functional mobility training;Transfer training;ADL/Self-care training;Neuromuscular re-education;Gait training; Safety education & training;Patient/Caregiver education & training   Restrictions  Restrictions/Precautions  Restrictions/Precautions: General Precautions, Fall Risk  Required Braces or Orthoses?: No   Subjective    Subjective  Subjective: Pt. in chair upon arrival with sitter present, pt. needing to return to bed at this time for [facility] line placement. Pain: denied  Orientation  Overall Orientation Status: Impaired   Objective   Bed Mobility Training  Bed Mobility Training: Yes  Overall Level of Assistance: Minimum assistance;Assist X2;Contact-guard assistance  Interventions: Manual cues;Demonstration;Visual cues; Tactile cues  Rolling: Assist X2;Contact-guard assistance;Minimum assistance  Supine to Sit: Minimum assistance;Assist X2;Contact-guard assistance  Sit to Supine: Minimum assistance;Assist X2;Contact-guard assistance  Scooting: Minimum assistance;Contact-guard assistance;Assist X1  Transfer Training  Transfer Training: Yes  Overall Level of Assistance: Minimum assistance;Assist X2;Contact-guard assistance  Interventions: Demonstration;Verbal cues; Tactile cues; Visual cues  Sit to Stand: Contact-guard assistance;Minimum assistance;Assist X2  Stand to Sit: Minimum assistance;Assist X2;Contact-guard assistance  Gait Training  Gait Training: Yes  Gait  Overall Level of Assistance: Minimum assistance;Assist X2  Interventions: Verbal cues  Speed/Velia: Slow  Step Length: Left shortened;Right shortened  Distance (ft):  (5ft x1)  Assistive Device: Other (comment)  PT Exercises  Exercise Treatment: PROM/AAROM to B LE sx15  Safety Devices  Type of Devices: Call light within reach; Sitter present; Left in bed;Bed alarm in place   Goals  Short Term Goals  Time Frame for Short Term Goals: 3 DAYS  Short Term Goal 1: MOD ASSIST BED MOBILITY  Short Term Goal 2: MIN ASSIST TRANSFERS. Long Term Goals  Time Frame for Long Term Goals : 4 WEEKS  Long Term Goal 1: MIN ASSIST TRANSFERS AND MIN ASSIST BED MOBILITY. Patient Goals   Patient Goals : UNABLE TO ASCERTAIN DUE TO IMPAIRED COGNITION AND LETHARGY. Education  Patient Education  Education Given To: Patient  Education Provided: Transfer Training  Education Provided Comments: Cues for technique with bed mobility, side stepping and transfers to optimize safety--poor+ to fair- correction noted, limited by cognition.   Education Method: Verbal;Demonstration  Barriers to Learning: Cognition  Education Outcome: Continued education needed  Therapy Time   Individual Concurrent Group Co-treatment   Time In 6077         Time Out 9465         Minutes [address], [place]

## 2022-10-19 NOTE — PROGRESS NOTES
Patient resting comfortably in bed, no signs or symptoms of stress or anxiety noted. SPO2 at 95% on room air. VS stable, bed alarm on.

## 2022-10-19 NOTE — PROGRESS NOTES
Patient awoke and took her evening medications. Took medications with pudding and nectar thickened water.

## 2022-10-19 NOTE — PROGRESS NOTES
Assisted patient into wheelchair. Wheeled patient around unit. Patient converses with other staff. Returned to bed. 1:1 sitter remains.

## 2022-10-19 NOTE — PROGRESS NOTES
[facility]    Facility/Department: [facility] AT THE [facility] MED SURG    Speech Language Pathology    Clinical Bedside Swallow Evaluation    NAME:Bridget Bartlett    : 1955 (74 y.o.)    MRN: [id number]    ROOM: 0329/0329-01    ADMISSION DATE: 10/11/2022    PATIENT DIAGNOSIS(ES): Dehydration [E86.0]  Hypernatremia [E87.0]  Altered mental status, unspecified altered mental status type [R41.82]    Chief Complaint   Patient presents with    Altered Mental Status     Na+ 161 per [facility]       Patient Active Problem List    Diagnosis Date Noted    ALEXX (obstructive sleep apnea)-witnessed 10/18/2022    MSSA (methicillin susceptible Staphylococcus aureus) septicemia ([address] 10/17/2022    Parotitis, acute 10/17/2022    SHAINA (acute kidney injury) ([address]     Alcoholic cirrhosis of liver with ascites ([address] 10/17/2022    Major depressive disorder 10/17/2022    Bacteremia due to Staphylococcus aureus 10/13/2022    Hypernatremia 10/11/2022    Dehydration 10/11/2022    Major depressive disorder with psychotic features ([address] 10/11/2022    Type 2 diabetes mellitus without complication ([address] 5408    Essential hypertension 10/11/2022    ASHD (arteriosclerotic heart disease) 10/11/2022    Gastroesophageal reflux disease without esophagitis 10/11/2022    Macular pucker, left eye 2021       Past Medical History:   Diagnosis Date    Alcohol dependence in remission ([address]     Alcoholic cirrhosis of liver without ascites ([address]     ASHD (arteriosclerotic heart disease)     Bacteremia due to Staphylococcus aureus 10/13/2022    Depression     Diabetes mellitus (HCC)     GERD (gastroesophageal reflux disease)     Hepatitis C     WITH FULL RECOVERY - INFECTIOUS DISEASE DR. Aurea Thompson - LAST VISIT 2020    Hyperlipidemia     Hypertension     Irregular heartbeat     Murmur, cardiac     noted by PCP, no echo per patient    Urinary bladder incontinence     Wears eyeglasses     Well adult health check     PCP - DR. Junior Colon Salas Barrera - 3/2021    [facility] adult health check     INFECTIOUS DISEASE - DR. Jennifer Zabala    [facility] adult health check     GI - DR. Alonso Padilla       Past Surgical History:   Procedure Laterality Date    BREAST SURGERY Right 1975    REMOVAL BENIGN TUMOR    COLONOSCOPY      HIP SURGERY Right 1964    RECONSTRUCTION    TONSILLECTOMY      AS A CHILD    TUBAL LIGATION  1975    VITRECTOMY Left 04/22/2021    VITRECTOMY Left 4/22/2021    VITRECTOMY 25 GAUGE, MEMBRANE PEEL, ICG performed by Kain Evans MD at [address] [facility] [place]   Allergen Reactions    Lisinopril Other (See Comments)     COUGH       DATE ONSET: 10/11/22    Date of Evaluation: 10/19/2022    Evaluating Therapist: JEZ Brown    Dysphagia Diagnosis    Dysphagia Diagnosis: Mild oral stage dysphagia; Suspected needs further assessment    Recommended Diet    Diet Solids Recommendation: Pureed    Liquid Consistency Recommendation: Mildly Thick (Nectar)    Recommended Form of Meds: Crushed in puree as able    Compensatory Swallowing Strategies : Eat/Feed slowly;Upright as possible for all oral intake;Remain upright for 30-45 minutes after meals;Assist feed; Alternate solids and liquids;Small bites/sips; Other (comments) (Only feed when alert/cooperative)    Reason for Referral    Sia Garcia was referred for a bedside swallow evaluation to assess the efficiency of her swallow function, identify signs and symptoms of aspiration, identify risk factors, and make recommendations regarding safe dietary consistencies, effective compensatory strategies, and safe eating environment. Prior Dysphagia History  Prior Dysphagia History: Unknown dysphagia history. Patient is currently on a puree and mildly-thick liquids. Patient Complaint  Patient Complaint: RN reported patient was pocketing food. General    Chart Reviewed: Yes  Behavior/Cognition: Agitated; Impulsive  Respiratory Status: Room air  O2 Device: None (Room air)  Communication Observation: Functional  Follows Directions: Simple  Dentition: Poor dental/oral hygeine  Patient Positioning: Upright in bed  Baseline Vocal Quality: Normal  Prior Dysphagia History: Unknown dysphagia history. Patient is currently on a puree and mildly-thick liquids. Consistencies Administered: Pureed;Thin;Mildly Thick - straw; Thin - straw    Vision and Hearing    Vision  Vision: Within Functional Limits  Hearing  Hearing: Within functional limits    Current Diet level    Current Diet : Pureed    Oral Motor    Labial: Decreased rate;Decreased seal  Dentition: Full  Oral Hygiene: Xerostomia; Other (Residues from breakfast meal)  Lingual: Decreased strength; Incoordinated  Mandible: Restricted    Oral/Pharyngeal Phase    Oral Phase - Comment: Patient presents wtih mild oral phase dysphagia characterized by reduced strength and Coordination of labial and lingual musculature. Patient was able to clear 90% of oral cavity after trials of medications crushed in puree and puree solids. Patient at times demonstrated poor labial seal to suck liquids through straw. Pharyngeal Phase: Patient's pharyngeal phase requires further assessment. Patient demonstrated slightly delayed swallow initiation, but functional laryngeal elevation upon palpation. Patient demonstrated no aspiration or penetration with any solid consistencies. PO Trials  Assessment Method(s): Observation  Vocal Quality: No Impairment  Consistency Presented: Thin;Pureed;Mildly Thick  How Presented: Self-fed/presented  Bolus Acceptance: No impairment  Propulsion: Discoordination  Oral Residue: Less than 10% of bolus  Initiation of Swallow: Delayed (# of seconds) (1-2)  Laryngeal Elevation: Functional  Aspiration Signs/Symptoms: None  Pharyngeal Phase Characteristics: No impairment, issues, or problems        Dysphagia Diagnosis    Dysphagia Diagnosis: Mild oral stage dysphagia; Suspected needs further assessment    Dysphagia Outcome Severity Scale: Level 3: Moderate dysphagia- Total assisstance, supervision or strategies. Two or more diet consistencies restricted    Recommendations    Requires SLP Intervention: Yes  Diet Solids Recommendation: Pureed  Liquid Consistency Recommendation: Mildly Thick (Nectar)  Compensatory Swallowing Strategies : Eat/Feed slowly;Upright as possible for all oral intake;Remain upright for 30-45 minutes after meals;Assist feed; Alternate solids and liquids;Small bites/sips; Other (comments) (Only feed when alert/cooperative)  Recommended Form of Meds: Crushed in puree as able  Therapeutic Interventions: Diet tolerance monitoring; Bolus control exercises;Oral care; Patient/Family education  Frequency of Treatment: Daily during inpatient stay    Prognosis    Prognosis: Fair    Education    Individuals consulted  Consulted and agree with results and recommendations: Patient;RN  RN Name: Tyra Schmidt         Treatment/Goals    Short-term Goals  Timeframe for Short-term Goals: 4 days  Goal 1: Patient will trial thin liquids without overt s/sx of aspiration/penetration in 80% of opportunities. Goal 2: Patient will trial minced and moist solids with adequate bolus prepartion/propulsion and no oral residues in 80% of opportunities. Long-term Goals  Timeframe for Long-term Goals: 7 days  Goal 1: Patient will tolerate safest, least restrictive diet without overt s/sx of aspiration/penetration in 90% of opportunities. Pain Assessment    Pain Assessment: Patient does not c/o pain    Pain Re-assessment    Pain Reassessment: Patient does not c/o pain    Therapy Time    SLP Individual Minutes  Time In: 7787  Time Out: 0533  Minutes: 28     Patient seen in room for bedside swallow evaluation this date. Patient's RN reports patient has been pocketing/spitting out food. Patient is currently on puree and mildly thick liquid diet. Patient presented from [address] [place] [address] and from [facility] prior to this admission. Patient's baseline diet is unknown.  Patient has reportedly had frequent behavioral changes and at times is agitated and others very lethargic with decreased alertness. Patient seen during medication administration. Patient tolerated medications crushed in puree without difficulty. No oral residues or s/sx of aspiration/penetration noted. Patient presents wtih mild oral phase dysphagia characterized by reduced strength and Coordination of labial and lingual musculature. Patient was able to clear 90% of oral cavity after trials of medications crushed in puree and puree solids. Patient at times demonstrated poor labial seal to suck liquids through straw. Patient's pharyngeal phase requires further assessment. Patient demonstrated slightly delayed swallow initiation, but functional laryngeal elevation upon palpation. Patient demonstrated no aspiration or penetration with any solid consistencies. ST will complete advanced trials to determine safest, least restrictive diet. Continue current diet of puree solids and mildly-thick (nectar-thick) liquids. Compensatory swallowing strategies should include: Feed only when alert and cooperative, small bites/sips, pacing/slow rate, alternate bites/sips, upright during and 30-45 minutes after PO intake.       Electronically signed: Agus Leija M.S. [id number] [facility]             10/19/2022

## 2022-10-19 NOTE — PROGRESS NOTES
Patient continues to escalate in anxiety like behaviors and agitation. Pulling off Bipap, throwing legs over bed railings, trying to pull out meeks catheter. Patient given Ativan PRN to help with anxiety.

## 2022-10-20 LAB
ALBUMIN SERPL-MCNC: 3.1 G/DL (ref 3.5–5.2)
ALBUMIN/GLOBULIN RATIO: 1.1 (ref 1–2.5)
ALP BLD-CCNC: 69 U/L (ref 35–104)
ALT SERPL-CCNC: 12 U/L (ref 5–33)
ANION GAP SERPL CALCULATED.3IONS-SCNC: 8 MMOL/L (ref 9–17)
AST SERPL-CCNC: 24 U/L
BILIRUB SERPL-MCNC: 0.3 MG/DL (ref 0.3–1.2)
BUN BLDV-MCNC: 4 MG/DL (ref 8–23)
BUN/CREAT BLD: 7 (ref 9–20)
CALCIUM SERPL-MCNC: 8.7 MG/DL (ref 8.6–10.4)
CHLORIDE BLD-SCNC: 103 MMOL/L (ref 98–107)
CO2: 28 MMOL/L (ref 20–31)
CREAT SERPL-MCNC: 0.6 MG/DL (ref 0.5–0.9)
GFR SERPL CREATININE-BSD FRML MDRD: >60 ML/MIN/1.73M2
GLUCOSE BLD-MCNC: 120 MG/DL (ref 74–100)
GLUCOSE BLD-MCNC: 173 MG/DL (ref 74–100)
GLUCOSE BLD-MCNC: 184 MG/DL (ref 74–100)
GLUCOSE BLD-MCNC: 202 MG/DL (ref 70–99)
POTASSIUM SERPL-SCNC: 3.8 MMOL/L (ref 3.7–5.3)
SODIUM BLD-SCNC: 139 MMOL/L (ref 135–144)
TOTAL PROTEIN: 5.9 G/DL (ref 6.4–8.3)

## 2022-10-20 PROCEDURE — 2580000003 HC RX 258: Performed by: INTERNAL MEDICINE

## 2022-10-20 PROCEDURE — 36415 COLL VENOUS BLD VENIPUNCTURE: CPT

## 2022-10-20 PROCEDURE — 99232 SBSQ HOSP IP/OBS MODERATE 35: CPT | Performed by: INTERNAL MEDICINE

## 2022-10-20 PROCEDURE — 6370000000 HC RX 637 (ALT 250 FOR IP): Performed by: FAMILY MEDICINE

## 2022-10-20 PROCEDURE — 1200000000 HC SEMI PRIVATE

## 2022-10-20 PROCEDURE — 94761 N-INVAS EAR/PLS OXIMETRY MLT: CPT

## 2022-10-20 PROCEDURE — 97116 GAIT TRAINING THERAPY: CPT

## 2022-10-20 PROCEDURE — 92526 ORAL FUNCTION THERAPY: CPT

## 2022-10-20 PROCEDURE — 6370000000 HC RX 637 (ALT 250 FOR IP): Performed by: NURSE PRACTITIONER

## 2022-10-20 PROCEDURE — 6360000002 HC RX W HCPCS: Performed by: NURSE PRACTITIONER

## 2022-10-20 PROCEDURE — 97535 SELF CARE MNGMENT TRAINING: CPT

## 2022-10-20 PROCEDURE — 82947 ASSAY GLUCOSE BLOOD QUANT: CPT

## 2022-10-20 PROCEDURE — 2580000003 HC RX 258: Performed by: NURSE PRACTITIONER

## 2022-10-20 PROCEDURE — 80053 COMPREHEN METABOLIC PANEL: CPT

## 2022-10-20 RX ORDER — HALOPERIDOL 5 MG/1
5 TABLET ORAL 3 TIMES DAILY
Status: DISCONTINUED | OUTPATIENT
Start: 2022-10-20 | End: 2022-10-25

## 2022-10-20 RX ADMIN — CEFAZOLIN 2000 MG: 2 INJECTION, POWDER, FOR SOLUTION INTRAMUSCULAR; INTRAVENOUS at 04:10

## 2022-10-20 RX ADMIN — SODIUM CHLORIDE, PRESERVATIVE FREE 10 ML: 5 INJECTION INTRAVENOUS at 22:17

## 2022-10-20 RX ADMIN — THIAMINE HCL TAB 100 MG 100 MG: 100 TAB at 08:24

## 2022-10-20 RX ADMIN — CEFAZOLIN 2000 MG: 2 INJECTION, POWDER, FOR SOLUTION INTRAMUSCULAR; INTRAVENOUS at 12:07

## 2022-10-20 RX ADMIN — DOCUSATE SODIUM 100 MG: 100 CAPSULE, LIQUID FILLED ORAL at 08:24

## 2022-10-20 RX ADMIN — RISPERIDONE 1 MG: 1 TABLET, FILM COATED ORAL at 08:25

## 2022-10-20 RX ADMIN — DOCUSATE SODIUM 100 MG: 100 CAPSULE, LIQUID FILLED ORAL at 21:59

## 2022-10-20 RX ADMIN — BENZTROPINE MESYLATE 1 MG: 1 TABLET ORAL at 21:59

## 2022-10-20 RX ADMIN — LORAZEPAM 0.5 MG: 0.5 TABLET ORAL at 12:54

## 2022-10-20 RX ADMIN — NYSTATIN 500000 UNITS: 100000 SUSPENSION ORAL at 08:29

## 2022-10-20 RX ADMIN — DIVALPROEX SODIUM 125 MG: 125 CAPSULE ORAL at 08:24

## 2022-10-20 RX ADMIN — METOPROLOL TARTRATE 50 MG: 50 TABLET, FILM COATED ORAL at 08:24

## 2022-10-20 RX ADMIN — SENNOSIDES 8.6 MG: 8.6 TABLET, FILM COATED ORAL at 21:59

## 2022-10-20 RX ADMIN — DEXTROSE MONOHYDRATE: 50 INJECTION, SOLUTION INTRAVENOUS at 03:12

## 2022-10-20 RX ADMIN — HALOPERIDOL 5 MG: 5 TABLET ORAL at 08:24

## 2022-10-20 RX ADMIN — HALOPERIDOL 5 MG: 5 TABLET ORAL at 21:59

## 2022-10-20 RX ADMIN — POTASSIUM CHLORIDE 10 MEQ: 10 TABLET, EXTENDED RELEASE ORAL at 08:24

## 2022-10-20 RX ADMIN — ASPIRIN 81 MG: 81 TABLET, COATED ORAL at 08:24

## 2022-10-20 RX ADMIN — PANTOPRAZOLE SODIUM 40 MG: 40 TABLET, DELAYED RELEASE ORAL at 08:24

## 2022-10-20 RX ADMIN — PRAVASTATIN SODIUM 40 MG: 20 TABLET ORAL at 08:24

## 2022-10-20 RX ADMIN — DIVALPROEX SODIUM 125 MG: 125 CAPSULE ORAL at 14:53

## 2022-10-20 RX ADMIN — LORAZEPAM 0.5 MG: 0.5 TABLET ORAL at 02:39

## 2022-10-20 RX ADMIN — DIVALPROEX SODIUM 125 MG: 125 CAPSULE ORAL at 21:59

## 2022-10-20 RX ADMIN — METOPROLOL TARTRATE 50 MG: 50 TABLET, FILM COATED ORAL at 21:59

## 2022-10-20 RX ADMIN — FOLIC ACID 1 MG: 1 TABLET ORAL at 08:24

## 2022-10-20 RX ADMIN — HALOPERIDOL 5 MG: 5 TABLET ORAL at 14:53

## 2022-10-20 RX ADMIN — LORAZEPAM 0.5 MG: 0.5 TABLET ORAL at 21:59

## 2022-10-20 RX ADMIN — CEFAZOLIN 2000 MG: 2 INJECTION, POWDER, FOR SOLUTION INTRAMUSCULAR; INTRAVENOUS at 22:15

## 2022-10-20 RX ADMIN — CHLORHEXIDINE GLUCONATE 0.12% ORAL RINSE 15 ML: 1.2 LIQUID ORAL at 08:29

## 2022-10-20 RX ADMIN — BENZTROPINE MESYLATE 1 MG: 1 TABLET ORAL at 08:24

## 2022-10-20 RX ADMIN — RISPERIDONE 1 MG: 1 TABLET, FILM COATED ORAL at 21:59

## 2022-10-20 NOTE — PROGRESS NOTES
Occupational Therapy  Facility/Department: [facility] AT THE [facility] MED SURG  Daily Treatment Note  NAME: Palmira Cortés  : 1955  MRN: [id number]    Date of Service: 10/20/2022    Discharge Recommendations:  Continue to assess pending progress, Subacute/Skilled Nursing Facility         Patient Diagnosis(es): The primary encounter diagnosis was Hypernatremia. Diagnoses of Dehydration and Altered mental status, unspecified altered mental status type were also pertinent to this visit. Assessment    Activity Tolerance: Patient limited by fatigue;Treatment limited secondary to decreased cognition;Patient tolerated treatment well  Discharge Recommendations: Continue to assess pending progress;Subacute/Skilled Nursing Facility      Plan   Occupational Therapy Plan  Times Per Week: 7x/week  Times Per Day: Once a day  Current Treatment Recommendations: Strengthening; Endurance training;Balance training;Patient/Caregiver education & training; Safety education & training;Self-Care / ADL     Restrictions  Restrictions/Precautions  Restrictions/Precautions: General Precautions; Fall Risk    Subjective   Subjective  Subjective: Pt lying awake in bed with STNA present. Pt agreed to participate in therapy session. Pain: Pt reported pain in elbow this date.            Objective    Vitals     Bed Mobility Training  Bed Mobility Training: Yes  Overall Level of Assistance: Minimum assistance;Assist X2;Contact-guard assistance  Rolling: Assist X2;Contact-guard assistance;Minimum assistance  Supine to Sit: Minimum assistance;Assist X2;Contact-guard assistance  Sit to Supine: Minimum assistance;Assist X2;Contact-guard assistance  Scooting: Minimum assistance;Contact-guard assistance;Assist X1  Transfer Training  Transfer Training: Yes  Overall Level of Assistance: Minimum assistance;Assist X2;Contact-guard assistance  Sit to Stand: Contact-guard assistance;Minimum assistance;Assist X2  Stand to Sit: Minimum assistance;Assist X2;Contact-guard assistance  Bed to Chair: Contact-guard assistance;Minimum assistance;Assist X2              Safety Devices  Type of Devices: Call light within reach; Left in chair;Sitter present     Patient Education  Education Given To: Patient  Education Provided: Role of Therapy;Plan of Care;Transfer Training  Education Method: Verbal  Barriers to Learning: Cognition  Education Outcome: Continued education needed    Goals  Short Term Goals  Time Frame for Short Term Goals: 10 visits  Short Term Goal 1: Pt will complete AROM/AAROM to BUE as tolerated to maintain joint range of motion for improved participation in ADL and functional transfers. Short Term Goal 2: Pt will complete functional transfers during ADL tasks modA to improve safety and participation in ADL tasks. Short Term Goal 3: Pt will participate in bathing tasks while seated in chair or upright in bed with moderate verbal prompting and modA.        Therapy Time   Individual Concurrent Group Co-treatment   Time In 0957         Time Out 1010         Minutes 13                 TAVO De Santiago/SWETHA

## 2022-10-20 NOTE — PROGRESS NOTES
[facility]    Facility/Department: [facility] AT THE [facility] MED SURG    Speech Language Pathology    Clinical Bedside Swallow Evaluation    NAME:Bridget Walden    : 1955 (74 y.o.)    MRN: [id number]    ROOM: 0329/0329-01    ADMISSION DATE: 10/11/2022    PATIENT DIAGNOSIS(ES): Dehydration [E86.0]  Hypernatremia [E87.0]  Altered mental status, unspecified altered mental status type [R41.82]    Chief Complaint   Patient presents with    Altered Mental Status     Na+ 161 per [facility]       Patient Active Problem List    Diagnosis Date Noted    Hypokalemia 10/19/2022    ALEXX (obstructive sleep apnea)-witnessed 10/18/2022    MSSA (methicillin susceptible Staphylococcus aureus) septicemia ([address] 10/17/2022    Parotitis, acute 10/17/2022    SHAINA (acute kidney injury) ([address]     Alcoholic cirrhosis of liver with ascites ([address] 10/17/2022    Major depressive disorder 10/17/2022    Bacteremia due to Staphylococcus aureus 10/13/2022    Hypernatremia 10/11/2022    Dehydration 10/11/2022    Major depressive disorder with psychotic features ([address] 10/11/2022    Type 2 diabetes mellitus without complication ([address]     Essential hypertension 10/11/2022    ASHD (arteriosclerotic heart disease) 10/11/2022    Gastroesophageal reflux disease without esophagitis 10/11/2022    Macular pucker, left eye 2021       Past Medical History:   Diagnosis Date    Alcohol dependence in remission ([address]     Alcoholic cirrhosis of liver without ascites ([address]     ASHD (arteriosclerotic heart disease)     Bacteremia due to Staphylococcus aureus 10/13/2022    Depression     Diabetes mellitus (HCC)     GERD (gastroesophageal reflux disease)     Hepatitis C     WITH FULL RECOVERY - INFECTIOUS DISEASE DR. Joaquin Shelley - LAST VISIT 2020    Hyperlipidemia     Hypertension     Irregular heartbeat     Murmur, cardiac     noted by PCP, no echo per patient    Urinary bladder incontinence     Wears eyeglasses     Well adult health check PCP - DR. Lashanda Dominique - 3/2021    [facility] adult health check     INFECTIOUS DISEASE - DR. Alec Mcconnell    [facility] adult health check     GI - DR. Ankit Kimble       Past Surgical History:   Procedure Laterality Date    BREAST SURGERY Right 1975    REMOVAL BENIGN TUMOR    COLONOSCOPY      HIP SURGERY Right 1964    RECONSTRUCTION    TONSILLECTOMY      AS A CHILD    TUBAL LIGATION  1975    VITRECTOMY Left 04/22/2021    VITRECTOMY Left 4/22/2021    VITRECTOMY 25 GAUGE, MEMBRANE PEEL, ICG performed by Mario Dunne MD at [facility]   Allergen Reactions    Lisinopril Other (See Comments)     COUGH       DATE ONSET: 10/11/2022    Date of Evaluation: 10/20/2022    Evaluating Therapist: JEZ Lozoya    Dysphagia Diagnosis    Dysphagia Diagnosis: Mild oral stage dysphagia; Suspected needs further assessment    Recommended Diet         Diet Solids Recommendation: Pureed    Liquid Consistency Recommendation: Mildly Thick (Nectar)    Recommended Form of Meds: Meds in puree    Compensatory Swallowing Strategies : Eat/Feed slowly;Upright as possible for all oral intake;Remain upright for 30-45 minutes after meals;Assist feed; Alternate solids and liquids;Small bites/sips; Other (comments)    Reason for Referral    Florentino Huang was referred for a bedside swallow evaluation to assess the efficiency of her swallow function, identify signs and symptoms of aspiration, identify risk factors, and make recommendations regarding safe dietary consistencies, effective compensatory strategies, and safe eating environment. Prior Dysphagia History  Prior Dysphagia History: Unknown dysphagia history. Patient is currently on a puree and mildly-thick liquids. Patient Complaint  Patient Complaint: RN reported patient was pocketing food and that she attemps to complete large cereal swallows with all liquids.     General    Chart Reviewed: Yes  Behavior/Cognition: Impulsive;Confused  Respiratory Status: Room air  O2 Device: None (Room air)  Communication Observation: Functional  Dentition: Poor dental/oral hygeine  Patient Positioning: Upright in chair  Baseline Vocal Quality: Normal  Prior Dysphagia History: Unknown dysphagia history. Patient is currently on a puree and mildly-thick liquids. Consistencies Administered: Thin;Mildly Thick - straw;Minced and Moist    Vision and Hearing    Vision  Vision: Within Functional Limits  Hearing  Hearing: Within functional limits    Current Diet level    Current Diet : Pureed    Oral Motor    Labial: Decreased rate;Decreased seal  Dentition: Full  Oral Hygiene: Xerostomia; Other  Lingual: Decreased strength; Incoordinated  Mandible: Restricted    Oral/Pharyngeal Phase    Oral Phase - Comment: Patient presents wtih mild oral phase dysphagia characterized by reduced strength and coordination of mandicular, labial and lingual musculature. Patient was able to clear oral cavity after trials of minced and moist. Patient at times demonstrated poor labial seal to suck liquids through straw. Pharyngeal Phase: Patient's pharyngeal phase requires further assessment. Patient demonstrated slightly delayed swallow initiation, but functional laryngeal elevation upon palpation. Pt demonstrated immediate cough with self administered thin liquids via cup however no s/s with small ST controlled sips. PO Trials  Consistency Presented: Minced & Moist;Thin;Mildly Thick  How Presented: Self-fed/presented;SLP-fed/Presented; Successive Swallows;Straw;Cup/sip;Cup/gulp  Bolus Acceptance: No impairment  Bolus Formation/Control: Impaired (delayed)  Type of Impairment: Oral holding;Delayed;Piecemeal;Mastication  Propulsion: Discoordination  Oral Residue: None  Initiation of Swallow: Delayed (# of seconds)  Laryngeal Elevation: Functional  Aspiration Signs/Symptoms:  (immediate cough after large gulp of thin liquids)  Pharyngeal Phase Characteristics: Other (comment) (immediate cough after large gulp of thin liquids)  Effective Modifications: None                        Dysphagia Diagnosis    Dysphagia Diagnosis: Mild oral stage dysphagia; Suspected needs further assessment    Dysphagia Outcome Severity Scale: Level 3: Moderate dysphagia- Total assisstance, supervision or strategies. Two or more diet consistencies restricted    Recommendations    Requires SLP Intervention: Yes  Diet Solids Recommendation: Pureed  Liquid Consistency Recommendation: Mildly Thick (Nectar)  Compensatory Swallowing Strategies : Eat/Feed slowly;Upright as possible for all oral intake;Remain upright for 30-45 minutes after meals;Assist feed; Alternate solids and liquids;Small bites/sips; Other (comments)  Recommended Form of Meds: Meds in puree  Therapeutic Interventions: Diet tolerance monitoring; Bolus control exercises;Oral care; Patient/Family education  Frequency of Treatment: Daily during inpatient stay    Prognosis    Prognosis: Fair    Education    Individuals consulted  Consulted and agree with results and recommendations: Patient;RN  RN Name: Pallavi Arriaza         Treatment/Goals    Short-term Goals  Timeframe for Short-term Goals: 4 days  Goal 1: Patient will trial thin liquids without overt s/sx of aspiration/penetration in 80% of opportunities. Goal 2: Patient will trial minced and moist solids with adequate bolus prepartion/propulsion and no oral residues in 80% of opportunities. Long-term Goals  Timeframe for Long-term Goals: 7 days  Goal 1: Patient will tolerate safest, least restrictive diet without overt s/sx of aspiration/penetration in 90% of opportunities. Safety Devices    Safety Devices  Restraints Initially in Place: No    Pain Assessment    Pain Assessment: Patient does not c/o pain    Pain Re-assessment    Pain Reassessment: Patient does not c/o pain    Therapy Time    SLP Individual Minutes  Time In: 1011  Time Out: 1030  Minutes: 23    SUMMARY      Patient seen in room for bedside swallow treatment.  Patient's RN reports pt benefiting from medications whole with puree. Additionally reports pt continues with pocketing/spitting out food. Patient is currently on puree and mildly thick liquid diet. Patient presents wtih mild oral phase dysphagia characterized by reduced strength and coordination of  mandibular, labial and lingual musculature. Patient was able to clear oral cavity after trials of minced and moist solids. Patient at times demonstrated poor labial seal to suck liquids through straw upon initial attempts. Patient's pharyngeal phase requires further assessment. Patient demonstrated slightly delayed swallow initiation, but functional laryngeal elevation upon palpation. Pt demonstrated immediate cough with self administered thin liquids via cup however no s/s with small ST controlled sips. Pt required greatly prolonged time for minced and moist intake and max assist to modify intake rate for thin water. Additionally pt required prolonged time for each trials and breaks due to increased level of effort. ST will complete advanced trials to determine safety for intake of advanced levels to meet nutrition and hydration needs. Continue current diet of puree solids and mildly-thick (nectar-thick) liquids. Compensatory swallowing strategies should include: Feed only when alert and cooperative, small bites/sips, pacing/slow rate, alternate bites/sips, upright during and 30-45 minutes after PO intake.          Electronically signed: Hayley Dee [address], Dickson Wilkerson           10/20/2022

## 2022-10-20 NOTE — PROGRESS NOTES
Physical Therapy  Facility/Department: [facility] AT THE [facility] MED SURG  Daily Treatment Note  NAME: Sanjana Ramirez  : 1955  MRN: [id number]  Date of Service: 10/20/2022  Discharge Recommendations:  [address]   Patient Diagnosis(es): The primary encounter diagnosis was Hypernatremia. Diagnoses of Dehydration and Altered mental status, unspecified altered mental status type were also pertinent to this visit. Assessment   Assessment: Gait 5ftx1 with HHA x2, MIn A x2. Pt. has decreased stride length with shuffled gait pattern. Bed mobility and transfers:CGA/Min A x2  Activity Tolerance: Patient limited by fatigue;Treatment limited secondary to decreased cognition;Patient tolerated treatment well   Plan    Physcial Therapy Plan  General Plan: 2 times a day 7 days a week  Current Treatment Recommendations: Strengthening;ROM;Balance training;Functional mobility training;Transfer training;ADL/Self-care training;Neuromuscular re-education;Gait training; Safety education & training;Patient/Caregiver education & training   Restrictions  Restrictions/Precautions  Restrictions/Precautions: General Precautions, Fall Risk  Required Braces or Orthoses?: No   Subjective    Subjective  Subjective: Pt. in bed upon arrival, agreeable to get up to chair, pt. is 1:1.  Pain: denied  Orientation  Overall Orientation Status: Impaired   Objective   Bed Mobility Training  Bed Mobility Training: Yes  Overall Level of Assistance: Minimum assistance;Assist X2;Contact-guard assistance  Interventions: Manual cues;Demonstration;Visual cues; Tactile cues  Rolling: Assist X2;Contact-guard assistance;Minimum assistance  Supine to Sit: Minimum assistance;Assist X2;Contact-guard assistance  Sit to Supine: Minimum assistance;Assist X2;Contact-guard assistance  Scooting: Minimum assistance;Contact-guard assistance;Assist X1  Transfer Training  Transfer Training: Yes  Overall Level of Assistance: Minimum assistance;Assist X2;Contact-guard assistance  Interventions: Demonstration;Verbal cues; Tactile cues; Visual cues  Sit to Stand: Contact-guard assistance;Minimum assistance;Assist X2  Stand to Sit: Minimum assistance;Assist X2;Contact-guard assistance  Bed to Chair: Contact-guard assistance;Minimum assistance;Assist X2  Gait Training  Gait Training: Yes  Gait  Overall Level of Assistance: Minimum assistance;Assist X2  Interventions: Verbal cues  Speed/Velia: Slow  Step Length: Left shortened;Right shortened  Distance (ft):  (10ft)  Assistive Device: Other (comment) (HHA x2)  Safety Devices  Type of Devices: Call light within reach; Left in chair;Sitter present   Goals  Short Term Goals  Time Frame for Short Term Goals: 3 DAYS  Short Term Goal 1: MOD ASSIST BED MOBILITY  Short Term Goal 2: MIN ASSIST TRANSFERS. Long Term Goals  Time Frame for Long Term Goals : 4 WEEKS  Long Term Goal 1: MIN ASSIST TRANSFERS AND MIN ASSIST BED MOBILITY. Patient Goals   Patient Goals : UNABLE TO ASCERTAIN DUE TO IMPAIRED COGNITION AND LETHARGY. Education  Patient Education  Education Given To: Patient  Education Provided: Transfer Training; Fall Prevention Strategies  Education Provided Comments: educated pt. on not stepping over medical equipment for decreased fall risk with poor carry over.   Education Method: Verbal;Demonstration  Barriers to Learning: Cognition  Education Outcome: Continued education needed  Therapy Time   Individual Concurrent Group Co-treatment   Time In 3406         Time Out 1010         Minutes [address] [place] [address]

## 2022-10-20 NOTE — PROGRESS NOTES
Physical Therapy  Facility/Department: [facility] AT THE [facility] MED SURG  Daily Treatment Note  NAME: Guy Meadows  : 1955  MRN: [id number]  Date of Service: 10/20/2022  Discharge Recommendations:  [address]   Patient Diagnosis(es): The primary encounter diagnosis was Hypernatremia. Diagnoses of Dehydration and Altered mental status, unspecified altered mental status type were also pertinent to this visit. Assessment   Assessment: Gait 5ftx1 with HHA x2, MIn A x2. Pt. has decreased stride length with shuffled gait pattern. Noted LOB posterior into bed with transfer, required Min/Mod A. Bed mobility and transfers:CGA/Min A x2  Activity Tolerance: Patient limited by fatigue;Treatment limited secondary to decreased cognition;Patient tolerated treatment well   Plan    Physcial Therapy Plan  General Plan: 2 times a day 7 days a week  Current Treatment Recommendations: Strengthening;ROM;Balance training;Functional mobility training;Transfer training;ADL/Self-care training;Neuromuscular re-education;Gait training; Safety education & training;Patient/Caregiver education & training   Restrictions  Restrictions/Precautions  Restrictions/Precautions: General Precautions, Fall Risk  Required Braces or Orthoses?: No   Subjective    Subjective  Subjective: Pt. in chair upon arrival,restless upon arrival and trying to get out of chair with 1:1 present. Pain: denied  Orientation  Overall Orientation Status: Impaired   Objective   Bed Mobility Training  Bed Mobility Training: Yes  Overall Level of Assistance: Minimum assistance;Assist X2;Contact-guard assistance  Interventions: Manual cues;Demonstration;Visual cues; Tactile cues  Rolling: Assist X2;Contact-guard assistance;Minimum assistance  Supine to Sit: Minimum assistance;Assist X2;Contact-guard assistance  Sit to Supine: Minimum assistance;Assist X2;Contact-guard assistance  Scooting: Minimum assistance;Contact-guard assistance;Assist X1  Transfer Training  Transfer Training: Yes  Overall Level of Assistance: Minimum assistance;Assist X2;Contact-guard assistance  Interventions: Demonstration;Verbal cues; Tactile cues; Visual cues  Sit to Stand: Contact-guard assistance;Minimum assistance;Assist X2  Stand to Sit: Minimum assistance;Assist X2;Contact-guard assistance  Bed to Chair: Contact-guard assistance;Minimum assistance;Assist X2  Gait Training  Gait Training: Yes  Gait  Overall Level of Assistance: Minimum assistance;Assist X2  Interventions: Verbal cues  Speed/Velia: Slow  Step Length: Left shortened;Right shortened  Distance (ft):  (10ftx1)  Assistive Device: Other (comment) (HHA x2)  Safety Devices  Type of Devices: Call light within reach; Sitter present; Left in bed   Goals  Short Term Goals  Time Frame for Short Term Goals: 3 DAYS  Short Term Goal 1: MOD ASSIST BED MOBILITY  Short Term Goal 2: MIN ASSIST TRANSFERS. Long Term Goals  Time Frame for Long Term Goals : 4 WEEKS  Long Term Goal 1: MIN ASSIST TRANSFERS AND MIN ASSIST BED MOBILITY. Patient Goals   Patient Goals : UNABLE TO ASCERTAIN DUE TO IMPAIRED COGNITION AND LETHARGY. Education  Patient Education  Education Given To: Patient  Education Provided: Transfer Training; Fall Prevention Strategies  Education Provided Comments: educated pt. on not stepping over medical equipment for decreased fall risk with poor carry over.   Education Method: Verbal;Demonstration  Barriers to Learning: Cognition  Education Outcome: Continued education needed  Therapy Time   Individual Concurrent Group Co-treatment   Time In 1140         Time Out 6937         Minutes [place], [place]

## 2022-10-20 NOTE — PROGRESS NOTES
Patient has continued with adverse behaviors. Signs and symptoms of anxiety, restlessness, sleeplessness, trying to repeatedly climb out of bed. Will administer PRN Ativan for anxiety. Will continue to monitor.

## 2022-10-20 NOTE — PROGRESS NOTES
[facility] - Progress Note  - Telemedicine  Today's Date and Time: 10/20/2022, 1:11 PM    Impression :   MSSA septicemia 10-11-22 x 2  Lt parotitis from dehydration and MSSA  Dehydration  Hypernatremia  Altered mental status  SHAINA- Resolved  Cirrhosis of the liver  Major depression with psychotic features  Oral candidiasis    Patient evaluated by Telemedicine. Requesting Institution: [facility]  Provider Institution: [address] [place] [address] at [address] [facility] [place]: Ms Cher Santana, APRN- IM    Recommendations:   Repeat blood cultures 10-18-22  Continue cefazolin 2 gm IV q 8 hr. Stop date 11-13-22  Alternatively she could receive Ceftriaxone 2 gm IV q 24 hr until 11-13-22, if the facility where she is going can not accommodate IV infusions 3 times daily. Repeat blood cultures x 2 on 11-18-22 to document clearing of septicemia  Oral nystatin  Medical Decision Making/Summary/Discussion:10/20/2022       Infection Control Recommendations   [place] Precautions    Antimicrobial Stewardship Recommendations     Simplification of therapy  Targeted therapy    Coordination of Outpatient Care:   Estimated Length of IV antimicrobials:11-13-22  Patient will need Midline Catheter Insertion: yes  Patient will need PICC line Insertion:no  Patient will need: Home IV , [place],  SNF,  LTAC:  Yes  Patient will need outpatient wound care:No    Chief complaint/reason for consultation:   MSSA septicemia      History of Present Illness:   Meredith Odonnell is a 74y.o.-year-old  female who was initially admitted on 10/11/2022. Patient seen at the request of     INITIAL HISTORY:     Patient was transferred to [facility] fro [place] on 10-11-22 because of altered mental status. She was found to be severely hypernatremic with a Na of 161, dehydrated and in SHAINA. She has received hydration with improvement. Has been evaluated by Nephrology.      She has a past Hx of ETOH intake, cirrhosis of the liver with ascites, CAD, essential HTN, DM 2, GERD, bladder incontinence. ID service asked to evaluate because of S aureus septicemia on 10-13-22. CURRENT EVALUATION :10/20/2022  /68   Pulse 64   Temp 97.7 °F (36.5 °C) (Temporal)   Resp 18   Ht 5' 6\" (1.676 m)   Wt 155 lb 9.6 oz (70.6 kg)   SpO2 97%   BMI 25.11 kg/m²     Afebrile  VS stable    The patient is stable on room air. Altered mentation continues    There is no growth thus far on repeat blood cultures from 10/18/22  Midline placed for outpatient antibiotics    Discharge planning underway back to [facility]  No acute issues noted    Labs, X rays reviewed: 10/20/2022    BUN: 12-->8-->6  Cr:0.58-->0.73-->0.79  Na 145-->145-->147  K 4.1-->3.4-->4.6    WBC: 8.3-->7.5  Hb:12.4  Plat: 80-->97    Cultures:  Urine:    Blood:  10-11-22: MSSA x 2  10-18-22: No growth thus far  Sputum :    Wound:      Discussed with VICK, RN. I have personally reviewed the past medical history, past surgical history, medications, social history, and family history, and I have updated the database accordingly. Past Medical History:     Past Medical History:   Diagnosis Date    Alcohol dependence in remission [facility])     Alcoholic cirrhosis of liver without ascites (HCC)     ASHD (arteriosclerotic heart disease)     Bacteremia due to Staphylococcus aureus 10/13/2022    Depression     Diabetes mellitus (HCC)     GERD (gastroesophageal reflux disease)     Hepatitis C     WITH FULL RECOVERY - INFECTIOUS DISEASE DR. Tylor Carmona - LAST VISIT 7/2020    Hyperlipidemia     Hypertension     Irregular heartbeat     Murmur, cardiac     noted by PCP, no echo per patient    Urinary bladder incontinence     Wears eyeglasses     Well adult health check     PCP - DR. Dave Steel - 3/2021    Well adult health check     INFECTIOUS DISEASE - DR. Nadine Hanley    Well adult health check     GI - DR. Lilliam Damian Past Surgical  History:     Past Surgical History:   Procedure Laterality Date    BREAST SURGERY Right     REMOVAL BENIGN TUMOR    COLONOSCOPY      HIP SURGERY Right     RECONSTRUCTION    TONSILLECTOMY      AS A CHILD    TUBAL LIGATION  1975    VITRECTOMY Left 2021    VITRECTOMY Left 2021    VITRECTOMY 25 GAUGE, MEMBRANE PEEL, ICG performed by Kirk Ye [facility] at [facility] OR       Medications:      haloperidol  5 mg Oral TID    risperiDONE  1 mg Oral BID    lidocaine 1 % injection  5 mL IntraDERmal Once    sodium chloride flush  5-40 mL IntraVENous 2 times per day    docusate sodium  100 mg Oral BID    senna  1 tablet Oral Nightly    chlorhexidine  15 mL Mouth/Throat BID    nystatin  5 mL Oral 4x Daily    ceFAZolin  2,000 mg IntraVENous Q8H    insulin lispro  0-8 Units SubCUTAneous TID WC    insulin lispro  0-4 Units SubCUTAneous Nightly    aspirin  81 mg Oral Daily    benztropine  1 mg Oral BID    divalproex  125 mg Oral TID    folic acid  1 mg Oral Daily    metoprolol tartrate  50 mg Oral BID    pantoprazole  40 mg Oral QAM AC    potassium chloride  10 mEq Oral Daily    pravastatin  40 mg Oral Daily    vitamin B-1  100 mg Oral Daily    VORTIoxetine HBr  20 mg Oral Daily    sodium chloride flush  5-40 mL IntraVENous 2 times per day       Social History:     Social History     Socioeconomic History    Marital status: Single     Spouse name: Not on file    Number of children: Not on file    Years of education: Not on file    Highest education level: Not on file   Occupational History    Not on file   Tobacco Use    Smoking status: Former     Types: Cigarettes     Quit date:      Years since quittin.8    Smokeless tobacco: Never   Vaping Use    Vaping Use: Never used   Substance and Sexual Activity    Alcohol use: Not on file    Drug use: Not on file    Sexual activity: Not on file   Other Topics Concern    Not on file   Social History Narrative    Not on file     Social Determinants of Health     Financial Resource Strain: Not on file   Food Insecurity: Not on file   Transportation Needs: Not on file   Physical Activity: Not on file   Stress: Not on file   Social Connections: Not on file   Intimate Partner Violence: Not on file   Housing Stability: Not on file       Family History:   History reviewed. No pertinent family history. Allergies:   Lisinopril     Review of Systems:     Patient unable to provide ROS. Confused and agitated      Constitutional: No fevers or chills. No systemic complaints  Head: No headaches  Eyes: No double vision or blurry vision. No conjunctival inflammation. ENT: No sore throat or runny nose. . No hearing loss, tinnitus or vertigo. Cardiovascular: No chest pain or palpitations. No shortness of breath. No TESFAYE  Lung: No shortness of breath or cough. No sputum production  Abdomen: No nausea, vomiting, diarrhea, or abdominal pain. Howard Hidden No cramps. Genitourinary: No increased urinary frequency, or dysuria. No hematuria. No suprapubic or CVA pain  Musculoskeletal: No muscle aches or pains. No joint effusions, swelling or deformities  Hematologic: No bleeding or bruising. Neurologic: No headache, weakness, numbness, or tingling. Integument: No rash, no ulcers. Psychiatric: No depression. Endocrine: No polyuria, no polydipsia, no polyphagia. Physical Examination :   Patient Vitals for the past 8 hrs:   BP Temp Temp src Pulse Resp SpO2   10/20/22 0615 120/68 97.7 °F (36.5 °C) Temporal 64 18 97 %     General Appearance: Awake, alert, and in no apparent distress  Head:  Normocephalic, no trauma  Eyes: Pupils equal, round, reactive to light and accommodation; extraocular movements intact; sclera anicteric; conjunctivae pink. No embolic phenomena. ENT: Oropharynx clear, without erythema, exudate, or thrush. No tenderness of sinuses. Mouth/throat: mucosa pink and moist. No lesions. Lt side parotitis   Neck:Supple, without lymphadenopathy.  Thyroid normal, No bruits. Pulmonary/Chest: Clear to auscultation, without wheezes, rales, or rhonchi. No dullness to percussion. Cardiovascular: Regular rate and rhythm without murmurs, rubs, or gallops. Abdomen: Soft, non tender. Bowel sounds normal. No organomegaly. Ascites. All four Extremities: No cyanosis, clubbing, edema, or effusions. Neurologic: No gross sensory or motor deficits. Confused and agitated . Has tremors  Skin: Warm and dry with good turgor. No signs of peripheral arterial or venous insufficiency. No ulcerations. No open wounds. Medical Decision Making -Laboratory:   I have independently reviewed/ordered the following labs:    CBC with Differential:   Recent Labs     10/18/22  0620   WBC 7.5   HGB 12.4   HCT 37.3   PLT See Reflexed IPF Result   LYMPHOPCT 21*   MONOPCT 11     BMP:   Recent Labs     10/18/22  0620 10/19/22  0550 10/20/22  0540    147* 139   K 3.4* 4.6 3.8    108* 103   CO2 27 30 28   BUN 8 6* 4*   CREATININE 0.73 0.79 0.60   MG 2.1  --   --      Hepatic Function Panel:   Recent Labs     10/20/22  0540   PROT 5.9*   LABALBU 3.1*   BILITOT 0.3   ALKPHOS 69   ALT 12   AST 24     No results for input(s): RPR in the last 72 hours. No results for input(s): HIV in the last 72 hours. No results for input(s): BC in the last 72 hours. Lab Results   Component Value Date/Time    MUCUS TRACE 10/11/2022 07:00 PM    RBC 4.23 10/18/2022 06:20 AM    WBC 7.5 10/18/2022 06:20 AM    TURBIDITY Clear 10/11/2022 07:00 PM     Lab Results   Component Value Date/Time    CREATININE 0.60 10/20/2022 05:40 AM    GLUCOSE 202 10/20/2022 05:40 AM       Medical Decision Making-Imaging:     EXAMINATION:   ONE XRAY VIEW OF THE CHEST       10/11/2022 10:30 am       COMPARISON:   None. HISTORY:   ORDERING SYSTEM PROVIDED HISTORY: altered mental status   TECHNOLOGIST PROVIDED HISTORY:   altered mental status       FINDINGS:   The heart is not enlarged. No pulmonary venous congestion or edema.   No convincing lung consolidation or infiltrate. No pleural effusion or   pneumothorax. Osseous structures grossly intact. Impression   No acute cardiopulmonary process     Medical Decision [id number]:       Medical Decision Making-Other:     Note:  Labs, medications, radiologic studies were reviewed with personal review of films  Large amounts of data were reviewed  Discussed with nursing Staff, Discharge planner  Infection Control and Prevention measures reviewed  All prior entries were reviewed  Administer medications as ordered  Prognosis: Guarded  Discharge planning reviewed  Follow up as outpatient. Thank you for allowing us to participate in the care of this patient. Please call with questions. ROB Marti - CNP    ATTESTATION:    I have discussed the case, including pertinent history and exam findings with the APRN. I have evaluated the  History, physical findings and pictures of the patient and the key elements of the encounter have been performed by me. I have reviewed the laboratory data, other diagnostic studies and discussed them with the APRN. I have updated the medical record where necessary. I agree with the assessment, plan and orders as documented by the APRN.     Marin Beach MD.    Pager: [phone] - Office: [phone]

## 2022-10-20 NOTE — PROGRESS NOTES
Patient becoming less restless, having periods of time resting with eyes closed. Not attempting to climb over the side rails.

## 2022-10-20 NOTE — PROGRESS NOTES
Patient continues with anxious behaviors, trying to climb out of bed, throwing legs over the side rails, attempting to go out the bottom of the bed. Attempted diversion, 1:1, television, offered food and drink without success. PRN Ativan given for anxiety. Will continue to monitor.

## 2022-10-20 NOTE — PROGRESS NOTES
[facility]   Nephrology progress note  10/20/2022, 11:26 AM      Pt Name:    Rivera Levy  MRN:     [id number]     YOB: 1955  Admit Date:    10/11/2022  9:22 AM    TELEHEALTH EVALUATION -- Audio/Visual ([facility])     Telehealth service was provided with the patient at her  room [address], [facility] and myself the physician in my office in [address] [facility] and the [facility] who has initiated the visit. Pursuant to the emergency declaration under the [facility], [facility] waiver authority and the [employer] Act, this Virtual  Visit was conducted, with patient's consent, to reduce the patient's risk of exposure to COVID-19 and provide continuity of care for an established patient. Services were provided through a video synchronous discussion virtually to substitute for in-person clinic visit. Chief Complaint: Nephrology following for acute kidney injury and hypernatremia. Subjective:  Patient seen and examined  Not much communicative today. Fluctuating mental status  Eating and drinking better today but again fluctuates quite a bit  Decent urine output    Objective:  24HR INTAKE/OUTPUT:    Intake/Output Summary (Last 24 hours) at 10/20/2022 1126  Last data filed at 10/20/2022 1022  Gross per 24 hour   Intake 1590 ml   Output 2600 ml   Net -1010 ml        Admission weight: 151 lb 6.4 oz (68.7 kg)  Wt Readings from Last 3 Encounters:   10/19/22 155 lb 9.6 oz (70.6 kg)   04/22/21 180 lb (81.6 kg)        Vitals :   Vitals:    10/20/22 0300 10/20/22 0400 10/20/22 0500 10/20/22 0615   BP:    120/68   Pulse: 68 68 67 64   Resp:    18   Temp:    97.7 °F (36.5 °C)   TempSrc:    Temporal   SpO2:    97%   Weight:       Height:           Physical examination-limited due to being a televisit  General Appearance:  Well developed.  No distress  Mouth/Throat:  Oral mucosa dry  Neck: No accessory muscle use  CNS: Not much communicative   Musculoskeletal: Trace edema  Psych-not agitated    Medications:  Infusion:    sodium chloride      dextrose 100 mL/hr at 10/20/22 0312    dextrose      sodium chloride       Meds:    haloperidol  5 mg Oral TID    risperiDONE  1 mg Oral BID    lidocaine 1 % injection  5 mL IntraDERmal Once    sodium chloride flush  5-40 mL IntraVENous 2 times per day    docusate sodium  100 mg Oral BID    senna  1 tablet Oral Nightly    chlorhexidine  15 mL Mouth/Throat BID    nystatin  5 mL Oral 4x Daily    ceFAZolin  2,000 mg IntraVENous Q8H    insulin lispro  0-8 Units SubCUTAneous TID WC    insulin lispro  0-4 Units SubCUTAneous Nightly    aspirin  81 mg Oral Daily    benztropine  1 mg Oral BID    divalproex  125 mg Oral TID    folic acid  1 mg Oral Daily    metoprolol tartrate  50 mg Oral BID    pantoprazole  40 mg Oral QAM AC    potassium chloride  10 mEq Oral Daily    pravastatin  40 mg Oral Daily    vitamin B-1  100 mg Oral Daily    VORTIoxetine HBr  20 mg Oral Daily    sodium chloride flush  5-40 mL IntraVENous 2 times per day       Lab Data :  CBC:   Recent Labs     10/18/22  0620   WBC 7.5   HGB 12.4   HCT 37.3   PLT See Reflexed IPF Result       CMP:  Recent Labs     10/18/22  0620 10/19/22  0550 10/20/22  0540    147* 139   K 3.4* 4.6 3.8    108* 103   CO2 27 30 28   BUN 8 6* 4*   CREATININE 0.73 0.79 0.60   GLUCOSE 214* 185* 202*   CALCIUM 8.3* 8.7 8.7   MG 2.1  --   --        Hepatic:   Recent Labs     10/20/22  0540   LABALBU 3.1*   AST 24   ALT 12   BILITOT 0.3   ALKPHOS 69         Assessment and Plan:  Renal -acute kidney injury most likely secondary to volume depletion  Resolved with IV fluids now back to baseline in fact better than baseline. Currently at 0.6  Electrolytes -hypernatremia secondary to intracellular volume depletion overall much improved sodium is down to 139 today.   Every time the IV fluids are stopped the sodium worsens at this time encourage oral intake of liquids. DC D5W and monitor sodium level  Acid-base status appears to be stable  Hx of diabetes mellitus  Major depressive disorder with psychotic features  Essential hypertension stable  Positive blood cultures on antibiotics  Meds reviewed and discussed with nursing staff     Jolynn Quick MD  [facility]    This report has been created using voice recognition software.  It may contain minor errors which are inherent in voice recognition technology

## 2022-10-20 NOTE — PROGRESS NOTES
PRN Ativan has been ineffective. Patient continuously keeps trying to climb out of the bed. Continue with staff one on one for safety.

## 2022-10-20 NOTE — PROGRESS NOTES
Progress Note    SUBJECTIVE:    Patient seen for f/u of Hypernatremia. She resting in bed eyes closed no concerns today. Staff reports no BiPAP use. ROS: Unable due to altered mental status     All other systems were reviewed with the patient and are negative unless otherwise stated in HPI      OBJECTIVE:      Vitals:   Vitals:    10/20/22 0615   BP: 120/68   Pulse: 64   Resp: 18   Temp: 97.7 °F (36.5 °C)   SpO2: 97%     Weight: 155 lb 9.6 oz (70.6 kg)   Height: 5' 6\" (167.6 cm)     Weight  Wt Readings from Last 3 Encounters:   10/19/22 155 lb 9.6 oz (70.6 kg)   04/22/21 180 lb (81.6 kg)     Body mass index is 25.11 kg/m². 24HR INTAKE/OUTPUT:      Intake/Output Summary (Last 24 hours) at 10/20/2022 1225  Last data filed at 10/20/2022 1022  Gross per 24 hour   Intake 1590 ml   Output 2600 ml   Net -1010 ml     -----------------------------------------------------------------  Exam:    GEN:   eyes closed awakens easily   EYES:   EOMI, pupils equal   NECK: Supple. No lymphadenopathy. No carotid bruit  FACE/MOUTH:  left parotitis improving oral thrush resolving but mucous membranes less dry  CVS:     regular rate and rhythm, no audible murmur  PULM:  CTA, no wheezes, rales or rhonchi, no acute respiratory distress  ABD:     Bowels sounds normal.  Abdomen is soft. No distention. no tenderness to palpation. EXT:     no edema bilaterally . No calf tenderness. NEURO: Moves all extremities. Motor and sensory are grossly intact. Visible tremors   SKIN:    No rashes.   No skin lesions    -----------------------------------------------------------------    Diagnostic Data:      Complete Blood Count:   Recent Labs     10/18/22  0620   WBC 7.5   RBC 4.23   HGB 12.4   HCT 37.3   MCV 88.2   MCH 29.3   MCHC 33.2   RDW 12.5   PLT See Reflexed IPF Result        Last 3 Blood Glucose:   Recent Labs     10/18/22  0620 10/19/22  0550 10/20/22  0540   GLUCOSE 214* 185* 202*        Comprehensive Metabolic Profile:   Recent Labs     10/18/22  0620 10/19/22  0550 10/20/22  0540    147* 139   K 3.4* 4.6 3.8    108* 103   CO2 27 30 28   BUN 8 6* 4*   CREATININE 0.73 0.79 0.60   GLUCOSE 214* 185* 202*   CALCIUM 8.3* 8.7 8.7   PROT  --   --  5.9*   LABALBU  --   --  3.1*   BILITOT  --   --  0.3   ALKPHOS  --   --  69   AST  --   --  24   ALT  --   --  12        Urinalysis:   Lab Results   Component Value Date/Time    NITRU NEGATIVE 10/11/2022 07:00 PM    COLORU Yellow 10/11/2022 07:00 PM    PHUR 6.0 10/11/2022 07:00 PM    WBCUA 2 TO 5 10/11/2022 07:00 PM    RBCUA 2 TO 5 10/11/2022 07:00 PM    MUCUS TRACE 10/11/2022 07:00 PM    BACTERIA 1+ 10/11/2022 07:00 PM    SPECGRAV 1.020 10/11/2022 07:00 PM    LEUKOCYTESUR NEGATIVE 10/11/2022 07:00 PM    UROBILINOGEN ELEVATED 10/11/2022 07:00 PM    BILIRUBINUR NEGATIVE 10/11/2022 07:00 PM    GLUCOSEU 3+ 10/11/2022 07:00 PM    KETUA NEGATIVE 10/11/2022 07:00 PM       HgBA1c:    Lab Results   Component Value Date/Time    LABA1C 6.8 10/11/2022 09:35 AM       Radiology/Imaging:  XR CHEST (2 VW)   Final Result   No acute process. XR CHEST PORTABLE   Final Result   No acute cardiopulmonary process         CT Head W/O Contrast   Final Result   No acute intracranial abnormality. Prominent senescent changes. These appear more prominent than usually seen   in a patient of this age, but could be due to old injuries, various   medications, or history of substance abuse.                ASSESSMENT / PLAN:  Hypernatremia  Resolved  Appreciate Nephrology  IVL  Stop normal saline  Trend labs  Dehydration  IVL  Trend labs-improving  Possibly due to oversedation, decreased oral intake, psychotropic medications including other medications such as lactulose and Jardiance  Type 2 diabetes  POCT before meals and at bedtime  Insulin sliding scale  Hypoglycemia protocol  Hold Jardiance  Major depressive disorder with psychotic features  Stop Geodon   Continue Cogentin, Depakote, Trintellix,   Continue Risperdal to 1 mg bid  Increase oral Haldol to 5 mg tid  Continue Ativan 0.5 mg every 6 hours as needed  Hold Abilify  Bacteremia  Appreciate ID for ATB for DC   Stop Levaquin  Continue  Ancef  BC - Staph Aureus - awaiting denitrification and sensitivity  Thrush  Completed IV Diflucan  Continue Peridox  Constipation  Resolved  Continue Colace, Senokot  Witnessed ALEXX  Decreased responsiveness  Decreased doses of Haldol, Ativan and Risperodol  Apneic  Trend VBG  Continue CPAP/BiPAP-no BiPAP last 24 hours  Chest Xray negative  Essential hypertension  Continue Lopressor  Nutrition status:   at risk for malnutrition  Dietician consult initiated  Hospital Prophylaxis:   DVT: Lovenox   Stress Ulcer: H2 Blocker   High risk medications: none   Disposition:    Discharge plan is - Return to [address] [place] [address] when medically cleared  IV ATB through 11/13      Bijal Melani, APRN - CNP , APRN, NP-C  Hospital[facility] Medicine        10/20/2022, 12:25 PM

## 2022-10-20 NOTE — PROGRESS NOTES
Pt. Resting in bed at this time, pt. Tossing and turning, vitals are assessed, vitals are WDL. Pt. Positioned for comfort at this time, no signs of distress.

## 2022-10-20 NOTE — PLAN OF CARE
Problem: Discharge Planning  Goal: Discharge to home or other facility with appropriate resources  10/20/2022 0629 by Gordon Silva RN  Flowsheets (Taken 10/18/2022 1844 by Anastasiya Horne RN)  Discharge to home or other facility with appropriate resources: Identify barriers to discharge with patient and caregiver     Problem: ABCDS Injury Assessment  Goal: Absence of physical injury  10/20/2022 0629 by Gordon Silva RN  Note: Desmond scale monitoring per protocol. Inspect skin for breakdown frequently. Encourage pt to make frequent large adjustments in position or assist patient with turning. Document all areas of breakdown. Problem: Skin/Tissue Integrity  Goal: Absence of new skin breakdown  Description: 1. Monitor for areas of redness and/or skin breakdown  2. Assess vascular access sites hourly  3. Every 4-6 hours minimum:  Change oxygen saturation probe site  4. Every 4-6 hours:  If on nasal continuous positive airway pressure, respiratory therapy assess nares and determine need for appliance change or resting period. 10/20/2022 0629 by Gordon Silva RN  Note: Desmond scale monitoring per protocol. Inspect skin for breakdown frequently. Encourage pt to make frequent large adjustments in position or assist patient with turning. Document all areas of breakdown. Problem: Safety - Adult  Goal: Free from fall injury  10/20/2022 0629 by Gordon Silva RN  Note: Call light in reach at all times, frequent checks, bed in lowest position, wheels of bed and chair locked, non skid footwear on, appropriate transfer techniques, personal items within reach, walkways free of obstructions, fall risk armband and sign displayed, Hdez fall risk score per protocol. No falls this shift, will continue to monitor. Problem: Pain  Goal: Verbalizes/displays adequate comfort level or baseline comfort level  10/20/2022 0629 by Gordon Silva RN  Note: Pain assessed every four hours and as needed using 0-10 pain scale. Pt educated on scale and uses scale appropriately. Encourage pt to notify staff of pain before pain becomes uncontrollable. Correlate periods of heavy activity after pain medication administration.  Use pharmacological and non pharmacological methods for pain relief such as: warm blankets, ice, television, reading, or rest.

## 2022-10-21 LAB
ANION GAP SERPL CALCULATED.3IONS-SCNC: 12 MMOL/L (ref 9–17)
BUN BLDV-MCNC: 4 MG/DL (ref 8–23)
BUN/CREAT BLD: 7 (ref 9–20)
CALCIUM SERPL-MCNC: 8.6 MG/DL (ref 8.6–10.4)
CHLORIDE BLD-SCNC: 101 MMOL/L (ref 98–107)
CO2: 27 MMOL/L (ref 20–31)
CREAT SERPL-MCNC: 0.57 MG/DL (ref 0.5–0.9)
GFR SERPL CREATININE-BSD FRML MDRD: >60 ML/MIN/1.73M2
GLUCOSE BLD-MCNC: 136 MG/DL (ref 74–100)
GLUCOSE BLD-MCNC: 146 MG/DL (ref 74–100)
GLUCOSE BLD-MCNC: 154 MG/DL (ref 74–100)
GLUCOSE BLD-MCNC: 169 MG/DL (ref 70–99)
GLUCOSE BLD-MCNC: 220 MG/DL (ref 74–100)
POTASSIUM SERPL-SCNC: 3.8 MMOL/L (ref 3.7–5.3)
SODIUM BLD-SCNC: 140 MMOL/L (ref 135–144)

## 2022-10-21 PROCEDURE — APPSS30 APP SPLIT SHARED TIME 16-30 MINUTES: Performed by: NURSE PRACTITIONER

## 2022-10-21 PROCEDURE — 99233 SBSQ HOSP IP/OBS HIGH 50: CPT | Performed by: INTERNAL MEDICINE

## 2022-10-21 PROCEDURE — 80048 BASIC METABOLIC PNL TOTAL CA: CPT

## 2022-10-21 PROCEDURE — 94660 CPAP INITIATION&MGMT: CPT

## 2022-10-21 PROCEDURE — 94761 N-INVAS EAR/PLS OXIMETRY MLT: CPT

## 2022-10-21 PROCEDURE — 2580000003 HC RX 258: Performed by: FAMILY MEDICINE

## 2022-10-21 PROCEDURE — 51798 US URINE CAPACITY MEASURE: CPT

## 2022-10-21 PROCEDURE — 6370000000 HC RX 637 (ALT 250 FOR IP): Performed by: FAMILY MEDICINE

## 2022-10-21 PROCEDURE — 1200000000 HC SEMI PRIVATE

## 2022-10-21 PROCEDURE — 6370000000 HC RX 637 (ALT 250 FOR IP): Performed by: NURSE PRACTITIONER

## 2022-10-21 PROCEDURE — 82947 ASSAY GLUCOSE BLOOD QUANT: CPT

## 2022-10-21 PROCEDURE — 2580000003 HC RX 258: Performed by: NURSE PRACTITIONER

## 2022-10-21 PROCEDURE — 6360000002 HC RX W HCPCS: Performed by: NURSE PRACTITIONER

## 2022-10-21 RX ORDER — SODIUM CHLORIDE 9 MG/ML
INJECTION, SOLUTION INTRAVENOUS CONTINUOUS
Status: DISCONTINUED | OUTPATIENT
Start: 2022-10-21 | End: 2022-10-28 | Stop reason: HOSPADM

## 2022-10-21 RX ADMIN — LORAZEPAM 0.5 MG: 0.5 TABLET ORAL at 03:59

## 2022-10-21 RX ADMIN — CEFAZOLIN 2000 MG: 2 INJECTION, POWDER, FOR SOLUTION INTRAMUSCULAR; INTRAVENOUS at 11:45

## 2022-10-21 RX ADMIN — SENNOSIDES 8.6 MG: 8.6 TABLET, FILM COATED ORAL at 21:30

## 2022-10-21 RX ADMIN — METOPROLOL TARTRATE 50 MG: 50 TABLET, FILM COATED ORAL at 10:25

## 2022-10-21 RX ADMIN — CHLORHEXIDINE GLUCONATE 0.12% ORAL RINSE 15 ML: 1.2 LIQUID ORAL at 21:30

## 2022-10-21 RX ADMIN — POTASSIUM CHLORIDE 10 MEQ: 10 TABLET, EXTENDED RELEASE ORAL at 10:25

## 2022-10-21 RX ADMIN — FOLIC ACID 1 MG: 1 TABLET ORAL at 10:25

## 2022-10-21 RX ADMIN — HALOPERIDOL 5 MG: 5 TABLET ORAL at 10:25

## 2022-10-21 RX ADMIN — SODIUM CHLORIDE: 9 INJECTION, SOLUTION INTRAVENOUS at 18:51

## 2022-10-21 RX ADMIN — CEFAZOLIN 2000 MG: 2 INJECTION, POWDER, FOR SOLUTION INTRAMUSCULAR; INTRAVENOUS at 03:59

## 2022-10-21 RX ADMIN — PRAVASTATIN SODIUM 40 MG: 20 TABLET ORAL at 10:25

## 2022-10-21 RX ADMIN — SODIUM CHLORIDE, PRESERVATIVE FREE 20 ML: 5 INJECTION INTRAVENOUS at 08:00

## 2022-10-21 RX ADMIN — NYSTATIN 500000 UNITS: 100000 SUSPENSION ORAL at 16:16

## 2022-10-21 RX ADMIN — BENZTROPINE MESYLATE 1 MG: 1 TABLET ORAL at 10:27

## 2022-10-21 RX ADMIN — NYSTATIN 500000 UNITS: 100000 SUSPENSION ORAL at 21:30

## 2022-10-21 RX ADMIN — CHLORHEXIDINE GLUCONATE 0.12% ORAL RINSE 15 ML: 1.2 LIQUID ORAL at 11:32

## 2022-10-21 RX ADMIN — THIAMINE HCL TAB 100 MG 100 MG: 100 TAB at 10:25

## 2022-10-21 RX ADMIN — RISPERIDONE 1 MG: 1 TABLET, FILM COATED ORAL at 21:30

## 2022-10-21 RX ADMIN — DIVALPROEX SODIUM 125 MG: 125 CAPSULE ORAL at 10:25

## 2022-10-21 RX ADMIN — HALOPERIDOL 5 MG: 5 TABLET ORAL at 16:14

## 2022-10-21 RX ADMIN — HALOPERIDOL 5 MG: 5 TABLET ORAL at 21:30

## 2022-10-21 RX ADMIN — METOPROLOL TARTRATE 50 MG: 50 TABLET, FILM COATED ORAL at 21:30

## 2022-10-21 RX ADMIN — LORAZEPAM 0.5 MG: 0.5 TABLET ORAL at 16:40

## 2022-10-21 RX ADMIN — RISPERIDONE 1 MG: 1 TABLET, FILM COATED ORAL at 10:25

## 2022-10-21 RX ADMIN — DOCUSATE SODIUM 100 MG: 100 CAPSULE, LIQUID FILLED ORAL at 10:25

## 2022-10-21 RX ADMIN — DOCUSATE SODIUM 100 MG: 100 CAPSULE, LIQUID FILLED ORAL at 21:30

## 2022-10-21 RX ADMIN — DIVALPROEX SODIUM 125 MG: 125 CAPSULE ORAL at 21:30

## 2022-10-21 RX ADMIN — CEFAZOLIN 2000 MG: 2 INJECTION, POWDER, FOR SOLUTION INTRAMUSCULAR; INTRAVENOUS at 21:29

## 2022-10-21 RX ADMIN — ASPIRIN 81 MG: 81 TABLET, COATED ORAL at 10:25

## 2022-10-21 RX ADMIN — DIVALPROEX SODIUM 125 MG: 125 CAPSULE ORAL at 16:14

## 2022-10-21 RX ADMIN — NYSTATIN 500000 UNITS: 100000 SUSPENSION ORAL at 09:30

## 2022-10-21 RX ADMIN — BENZTROPINE MESYLATE 1 MG: 1 TABLET ORAL at 21:30

## 2022-10-21 RX ADMIN — SODIUM CHLORIDE, PRESERVATIVE FREE 20 ML: 5 INJECTION INTRAVENOUS at 11:31

## 2022-10-21 NOTE — PROGRESS NOTES
[address] [facility]  Inpatient/Observation/Outpatient Rehabilitation    Date: 10/21/2022  Patient Name: Zoie Mares       [x] Inpatient Acute/Observation       []  Outpatient  : 1955       [] Pt no showed for scheduled appointment    [] Pt refused/declined therapy at this time due to:           [x] Pt not seen due to:  [] No Reason Given   [] Sick/ill   [x] Other: unable to wake pt, \"She's been asleep all morning and when she is awake she is very agitated and fidgety. Therapist/Assistant will attempt to see this patient, at our earliest opportunity.        IRASEMA Bob Date: 10/21/2022

## 2022-10-21 NOTE — PROGRESS NOTES
[facility]  Inpatient/Observation/Outpatient Rehabilitation    Date: 10/21/2022  Patient Name: Rani Fenton       [x] Inpatient Acute/Observation       []  Outpatient  : 1955        [x] Pt cancelled due to:  [] No Reason Given   [] Sick/ill   [x] Other:  Patient is not appropriate for therapy at this time d/t unable to be fully awaken. Therapist/Assistant will attempt to see this patient, at our earliest opportunity.        Luís Kessler, NAN Date: 10/21/2022

## 2022-10-21 NOTE — PROGRESS NOTES
Staff has made several attempts to get pt to wake enough to eat or drink. Unsafe to attempt medications at this time.

## 2022-10-21 NOTE — PROGRESS NOTES
[facility]  SPEECH THERAPY  No Visit Note    [] ICU    [x] Acute   Patient: Kiera Jackson  Room: [facility]/6397-61      Kiera Feldmantein not seen on 10/21/2022 at 2:24 PM due to patient unable to awake enough for dysphagia therapy. ST will re-attempt when appropriate.       Signature: Irvin Trent

## 2022-10-21 NOTE — PROGRESS NOTES
[facility]  SPEECH THERAPY  No Visit Note    [] ICU    [x] Acute   Patient: Lyndsey Gaviria  Room: 62/4004-20      Lyndsey Gaviria not seen on 10/21/2022 at 10:34 AM due to patient unable to awake enough for PO trials or medication administration with RN despite maximal verbal and tactile cuing. ST will re-attempt at a later time .       Signature: Jake Jackson

## 2022-10-21 NOTE — PROGRESS NOTES
Pt very restless this evening, pulling on telemetry wires as well as trying to chew on them, throwing legs over rails/between rails, keeps stating \"I need to get out of here, I am going to be late\" staff at bedside trying to distract and keep her safe. Will continue to monitor.

## 2022-10-21 NOTE — PROGRESS NOTES
[facility]  Inpatient/Observation/Outpatient Rehabilitation    Date: 10/21/2022  Patient Name: Mirela Hills       [x] Inpatient Acute/Observation       []  Outpatient  : 1955            [x] Pt cancelled due to:  [] No Reason Given   [] Sick/ill   [x] Other: unable to wake pt per nurse aide    Therapist/Assistant will attempt to see this patient, at our earliest opportunity.        Janell Stockton Date: 10/21/2022

## 2022-10-21 NOTE — PROGRESS NOTES
[facility] - Progress Note  - Telemedicine  Today's Date and Time: 10/21/2022, 9:32 AM    Impression :   MSSA septicemia 10-11-22 x 2  Lt parotitis from dehydration and MSSA  Dehydration  Hypernatremia  Altered mental status  SHAINA- Resolved  Cirrhosis of the liver  Major depression with psychotic features  Oral candidiasis        Patient evaluated by Telemedicine. Requesting Institution: [facility]  Provider Institution: [address] [place] [address] at [address] [facility]: Ms Jolly Buck, APRN- IM    Recommendations:     Continue cefazolin 2 gm IV q 8 hr. Stop date 11-13-22  Alternatively she could receive Ceftriaxone 2 gm IV q 24 hr until 11-13-22, if the facility where she is going can not accommodate IV infusions 3 times daily. Repeat blood cultures x 2 on 11-18-22 to document clearing of septicemia  Oral nystatin  Medical Decision Making/Summary/Discussion:10/21/2022       Infection Control Recommendations   Ava Precautions    Antimicrobial Stewardship Recommendations     Simplification of therapy  Targeted therapy    Coordination of Outpatient Care:   Estimated Length of IV antimicrobials:11-13-22  Patient will need Midline Catheter Insertion: yes  Patient will need PICC line Insertion:no  Patient will need: Home IV , [place],  SNF,  LTAC:  Yes  Patient will need outpatient wound care:No    Chief complaint/reason for consultation:   MSSA septicemia      History of Present Illness:   Dayami Díaz is a 74y.o.-year-old  female who was initially admitted on 10/11/2022. Patient seen at the request of     INITIAL HISTORY:     Patient was transferred to [facility] fro [place] on 10-11-22 because of altered mental status. She was found to be severely hypernatremic with a Na of 161, dehydrated and in SHAINA. She has received hydration with improvement. Has been evaluated by Nephrology.      She has a past Hx of ETOH intake, cirrhosis of the liver with ascites, CAD, essential HTN, DM 2, GERD, bladder incontinence. ID service asked to evaluate because of S aureus septicemia on 10-13-22. CURRENT EVALUATION :10/21/2022  /65   Pulse 68   Temp (!) 96.5 °F (35.8 °C) (Temporal)   Resp 18   Ht 5' 6\" (1.676 m)   Wt 160 lb 1.6 oz (72.6 kg)   SpO2 95%   BMI 25.84 kg/m²     Afebrile  VS stable    Patient feels better  No complaints  No new issues per RN  Altered mentation continues with a sitter in the room    There is no growth thus far on repeat blood cultures from 10/18/22  Midline placed for outpatient antibiotics    Discharge planning underway back to [facility]      Labs, X rays reviewed: 10/21/2022    BUN: 12-->8-->6-->4  Cr:0.58-->0.73-->0.79-->0.57  Na 145-->145-->147-->140  K 4.1-->3.4-->4.6-->3.8    WBC: 8.3-->7.5  Hb:12.4  Plat: 80-->97    Cultures:  Urine:    Blood:  10-11-22: MSSA x 2  10-18-22: No growth   Sputum :    Wound:      Discussed with VICK, RN. I have personally reviewed the past medical history, past surgical history, medications, social history, and family history, and I have updated the database accordingly. Past Medical History:     Past Medical History:   Diagnosis Date    Alcohol dependence in remission [facility])     Alcoholic cirrhosis of liver without ascites (HCC)     ASHD (arteriosclerotic heart disease)     Bacteremia due to Staphylococcus aureus 10/13/2022    Depression     Diabetes mellitus (HCC)     GERD (gastroesophageal reflux disease)     Hepatitis C     WITH FULL RECOVERY - INFECTIOUS DISEASE DR. Wilde Jan - LAST VISIT 7/2020    Hyperlipidemia     Hypertension     Irregular heartbeat     Murmur, cardiac     noted by PCP, no echo per patient    Urinary bladder incontinence     Wears eyeglasses     Well adult health check     PCP - DR. Jewels Woodward - 3/2021    Well adult health check     INFECTIOUS DISEASE - DR. Tunde Boland    Well adult health check FRANCI LEONARD       Past Surgical  History:     Past Surgical History:   Procedure Laterality Date    BREAST SURGERY Right     REMOVAL BENIGN TUMOR    COLONOSCOPY      HIP SURGERY Right 1964    RECONSTRUCTION    TONSILLECTOMY      AS A CHILD    TUBAL LIGATION  1975    VITRECTOMY Left 2021    VITRECTOMY Left 2021    VITRECTOMY 25 GAUGE, MEMBRANE PEEL, ICG performed by Ayala Ye MD at [place] [address]       Medications:      haloperidol  5 mg Oral TID    risperiDONE  1 mg Oral BID    lidocaine 1 % injection  5 mL IntraDERmal Once    sodium chloride flush  5-40 mL IntraVENous 2 times per day    docusate sodium  100 mg Oral BID    senna  1 tablet Oral Nightly    chlorhexidine  15 mL Mouth/Throat BID    nystatin  5 mL Oral 4x Daily    ceFAZolin  2,000 mg IntraVENous Q8H    insulin lispro  0-8 Units SubCUTAneous TID WC    insulin lispro  0-4 Units SubCUTAneous Nightly    aspirin  81 mg Oral Daily    benztropine  1 mg Oral BID    divalproex  125 mg Oral TID    folic acid  1 mg Oral Daily    metoprolol tartrate  50 mg Oral BID    pantoprazole  40 mg Oral QAM AC    potassium chloride  10 mEq Oral Daily    pravastatin  40 mg Oral Daily    vitamin B-1  100 mg Oral Daily    VORTIoxetine HBr  20 mg Oral Daily    sodium chloride flush  5-40 mL IntraVENous 2 times per day       Social History:     Social History     Socioeconomic History    Marital status: Single     Spouse name: Not on file    Number of children: Not on file    Years of education: Not on file    Highest education level: Not on file   Occupational History    Not on file   Tobacco Use    Smoking status: Former     Types: Cigarettes     Quit date:      Years since quittin.8    Smokeless tobacco: Never   Vaping Use    Vaping Use: Never used   Substance and Sexual Activity    Alcohol use: Not on file    Drug use: Not on file    Sexual activity: Not on file   Other Topics Concern    Not on file   Social History Narrative    Not on file Social Determinants of Health     Financial Resource Strain: Not on file   Food Insecurity: Not on file   Transportation Needs: Not on file   Physical Activity: Not on file   Stress: Not on file   Social Connections: Not on file   Intimate Partner Violence: Not on file   Housing Stability: Not on file       Family History:   History reviewed. No pertinent family history. Allergies:   Lisinopril     Review of Systems:     Patient unable to provide ROS. Confused and agitated. 10/21/2022        Constitutional: No fevers or chills. No systemic complaints  Head: No headaches  Eyes: No double vision or blurry vision. No conjunctival inflammation. ENT: No sore throat or runny nose. . No hearing loss, tinnitus or vertigo. Cardiovascular: No chest pain or palpitations. No shortness of breath. No TESFAYE  Lung: No shortness of breath or cough. No sputum production  Abdomen: No nausea, vomiting, diarrhea, or abdominal pain. Keren Balling No cramps. Genitourinary: No increased urinary frequency, or dysuria. No hematuria. No suprapubic or CVA pain  Musculoskeletal: No muscle aches or pains. No joint effusions, swelling or deformities  Hematologic: No bleeding or bruising. Neurologic: No headache, weakness, numbness, or tingling. Integument: No rash, no ulcers. Psychiatric: No depression. Endocrine: No polyuria, no polydipsia, no polyphagia. Physical Examination :   Patient Vitals for the past 8 hrs:   BP Temp Temp src Pulse Resp SpO2 Weight   10/21/22 0721 110/65 (!) 96.5 °F (35.8 °C) Temporal 68 18 95 % --   10/21/22 0500 -- -- -- -- -- -- 160 lb 1.6 oz (72.6 kg)   10/21/22 0200 102/64 96.9 °F (36.1 °C) Temporal 67 16 96 % --     General Appearance: Awake, mentation altered and in no apparent distress  Head:  Normocephalic, no trauma  Eyes: Pupils equal, round, reactive to light and accommodation; extraocular movements intact; sclera anicteric; conjunctivae pink. No embolic phenomena.   ENT: Oropharynx clear, without erythema, exudate, or thrush. No tenderness of sinuses. Mouth/throat: mucosa pink and moist. No lesions. Lt side parotitis   Neck:Supple, without lymphadenopathy. Thyroid normal, No bruits. Pulmonary/Chest: Clear to auscultation, without wheezes, rales, or rhonchi. No dullness to percussion. Cardiovascular: Regular rate and rhythm without murmurs, rubs, or gallops. Abdomen: Soft, non tender. Bowel sounds normal. No organomegaly. Ascites. All four Extremities: No cyanosis, clubbing, edema, or effusions. Neurologic: No gross sensory or motor deficits. Confused and agitated . Has tremors  Skin: Warm and dry with good turgor. No signs of peripheral arterial or venous insufficiency. No ulcerations. No open wounds. Medical Decision Making -Laboratory:   I have independently reviewed/ordered the following labs:    CBC with Differential:   No results for input(s): WBC, HGB, HCT, PLT, SEGSPCT, BANDSPCT, LYMPHOPCT, MONOPCT, EOSPCT in the last 72 hours. BMP:   Recent Labs     10/20/22  0540 10/21/22  0530    140   K 3.8 3.8    101   CO2 28 27   BUN 4* 4*   CREATININE 0.60 0.57     Hepatic Function Panel:   Recent Labs     10/20/22  0540   PROT 5.9*   LABALBU 3.1*   BILITOT 0.3   ALKPHOS 69   ALT 12   AST 24     No results for input(s): RPR in the last 72 hours. No results for input(s): HIV in the last 72 hours. No results for input(s): BC in the last 72 hours. Lab Results   Component Value Date/Time    MUCUS TRACE 10/11/2022 07:00 PM    RBC 4.23 10/18/2022 06:20 AM    WBC 7.5 10/18/2022 06:20 AM    TURBIDITY Clear 10/11/2022 07:00 PM     Lab Results   Component Value Date/Time    CREATININE 0.57 10/21/2022 05:30 AM    GLUCOSE 169 10/21/2022 05:30 AM       Medical Decision Making-Imaging:     EXAMINATION:   ONE XRAY VIEW OF THE CHEST       10/11/2022 10:30 am       COMPARISON:   None.        HISTORY:   ORDERING SYSTEM PROVIDED HISTORY: altered mental status   TECHNOLOGIST PROVIDED HISTORY:   altered mental status       FINDINGS:   The heart is not enlarged. No pulmonary venous congestion or edema. No   convincing lung consolidation or infiltrate. No pleural effusion or   pneumothorax. Osseous structures grossly intact. Impression   No acute cardiopulmonary process     Medical Decision [id number]:       Medical Decision Making-Other:     Note:  Labs, medications, radiologic studies were reviewed with personal review of films  Large amounts of data were reviewed  Discussed with nursing Staff, Discharge planner  Infection Control and Prevention measures reviewed  All prior entries were reviewed  Administer medications as ordered  Prognosis: Guarded  Discharge planning reviewed  Follow up as outpatient. Thank you for allowing us to participate in the care of this patient. Please call with questions. ROB Thomas - CNP    ATTESTATION:    I have discussed the case, including pertinent history and exam findings with the residents and students. I have seen and examined the patient and the key elements of the encounter have been performed by me. I was present when the student obtained his information or examined the patient. I have reviewed the laboratory data, other diagnostic studies and discussed them with the residents. I have updated the medical record where necessary. I agree with the assessment, plan and orders as documented by the resident/ student.     Hossein Hardy MD.      Pager: [phone] - Office: [phone]

## 2022-10-21 NOTE — PROGRESS NOTES
Progress Note    SUBJECTIVE:    Patient seen for f/u of Hypernatremia. She resting in bed eyes closed  easy resp. Does moves with stimulation but does not awaken to engage or talk. No distress. Apnea at times. Likely ALEXX which is witnessed    ROS: Unable due to altered mental status     All other systems were reviewed with the patient and are negative unless otherwise stated in HPI      OBJECTIVE:      Vitals:   Vitals:    10/21/22 0721   BP: 110/65   Pulse: 68   Resp: 18   Temp: (!) 96.5 °F (35.8 °C)   SpO2: 95%     Weight: 160 lb 1.6 oz (72.6 kg)   Height: 5' 6\" (167.6 cm)     Weight  Wt Readings from Last 3 Encounters:   10/21/22 160 lb 1.6 oz (72.6 kg)   04/22/21 180 lb (81.6 kg)     Body mass index is 25.84 kg/m². 24HR INTAKE/OUTPUT:      Intake/Output Summary (Last 24 hours) at 10/21/2022 0948  Last data filed at 10/21/2022 0515  Gross per 24 hour   Intake 710 ml   Output 1550 ml   Net -840 ml     -----------------------------------------------------------------  Exam:    GEN:   eyes closed awakens easily   EYES:   EOMI, pupils equal   NECK: Supple. No lymphadenopathy. No carotid bruit  FACE/MOUTH:  no further palpable left parotitis improving oral thrush resolving but mucous membranes less dry  CVS:     regular rate and rhythm, no audible murmur  PULM:  CTA, no wheezes, rales or rhonchi, no acute respiratory distress  ABD:     Bowels sounds normal.  Abdomen is soft. No distention. no tenderness to palpation. EXT:     no edema bilaterally . No calf tenderness. NEURO: Moves all extremities. Motor and sensory are grossly intact. Visible tremors   SKIN:    No rashes. No skin lesions    -----------------------------------------------------------------    Diagnostic Data:      Complete Blood Count:   No results for input(s): WBC, RBC, HGB, HCT, MCV, MCH, MCHC, RDW, PLT, MPV in the last 72 hours.        Last 3 Blood Glucose:   Recent Labs     10/19/22  0550 10/20/22  0540 10/21/22  0530   GLUCOSE 185* 202* 169*        Comprehensive Metabolic Profile:   Recent Labs     10/19/22  0550 10/20/22  0540 10/21/22  0530   * 139 140   K 4.6 3.8 3.8   * 103 101   CO2 30 28 27   BUN 6* 4* 4*   CREATININE 0.79 0.60 0.57   GLUCOSE 185* 202* 169*   CALCIUM 8.7 8.7 8.6   PROT  --  5.9*  --    LABALBU  --  3.1*  --    BILITOT  --  0.3  --    ALKPHOS  --  69  --    AST  --  24  --    ALT  --  12  --         Urinalysis:   Lab Results   Component Value Date/Time    NITRU NEGATIVE 10/11/2022 07:00 PM    COLORU Yellow 10/11/2022 07:00 PM    PHUR 6.0 10/11/2022 07:00 PM    WBCUA 2 TO 5 10/11/2022 07:00 PM    RBCUA 2 TO 5 10/11/2022 07:00 PM    MUCUS TRACE 10/11/2022 07:00 PM    BACTERIA 1+ 10/11/2022 07:00 PM    SPECGRAV 1.020 10/11/2022 07:00 PM    LEUKOCYTESUR NEGATIVE 10/11/2022 07:00 PM    UROBILINOGEN ELEVATED 10/11/2022 07:00 PM    BILIRUBINUR NEGATIVE 10/11/2022 07:00 PM    GLUCOSEU 3+ 10/11/2022 07:00 PM    KETUA NEGATIVE 10/11/2022 07:00 PM       HgBA1c:    Lab Results   Component Value Date/Time    LABA1C 6.8 10/11/2022 09:35 AM       Radiology/Imaging:  XR CHEST (2 VW)   Final Result   No acute process. XR CHEST PORTABLE   Final Result   No acute cardiopulmonary process         CT Head W/O Contrast   Final Result   No acute intracranial abnormality. Prominent senescent changes. These appear more prominent than usually seen   in a patient of this age, but could be due to old injuries, various   medications, or history of substance abuse.                ASSESSMENT / PLAN:  Hypernatremia  Resolved  Appreciate Nephrology  IVL  Stop normal saline  Trend labs  Dehydration  IVL  Trend labs-improving  Possibly due to oversedation, decreased oral intake, psychotropic medications including other medications such as lactulose and Jardiance  Type 2 diabetes  POCT before meals and at bedtime  Insulin sliding scale  Hypoglycemia protocol  Hold Jardiance  Major depressive disorder with psychotic features  Stop Geodon   Continue Cogentin, Depakote, Trintellix,   Continue Risperdal to 1 mg bid  Continue oral Haldol to 5 mg tid  Continue Ativan 0.5 mg every 6 hours as needed  Hold Abilify  Bacteremia  Appreciate ID for ATB for DC   Stop Levaquin  Continue  Ancef  BC - Staph Aureus - awaiting denitrification and sensitivity  Thrush  Completed IV Diflucan  Continue Peridox  Constipation  Resolved  Continue Colace, Senokot  Witnessed ALEXX  Decreased responsiveness  Decreased doses of Haldol, Ativan and Risperodol  Apneic  Trend VBG  Change to CPAP at HS  Chest Xray negative  Essential hypertension  Continue Lopressor  Nutrition status:   at risk for malnutrition  Dietician consult initiated  Hospital Prophylaxis:   DVT: Lovenox   Stress Ulcer: H2 Blocker   High risk medications: none   Disposition:    Discharge plan is - Return to [facility] when medically cleared  Continuing to look at other options  IV ATB through 11/13      ROB Francois - CNP , ROB, NP-C  Hospitalist Medicine        10/21/2022, 9:48 AM

## 2022-10-21 NOTE — PROGRESS NOTES
[address]  Inpatient/Observation/Outpatient Rehabilitation    Date: 10/21/2022  Patient Name: Sanjana Bone       [x] Inpatient Acute/Observation       []  Outpatient  : 1955       [] Pt no showed for scheduled appointment    [] Pt refused/declined therapy at this time due to:           [x] Pt not seen due to:  [] No Reason Given   [] Sick/ill   [] Other: cont'd inability to arouse        IRASEMA Castillo Date: 10/21/2022

## 2022-10-21 NOTE — PROGRESS NOTES
Writer spoke with Franklin Cardoza NP due to patient not urinating since discontinuing meeks catheter. This was reported to writer from [facility] that was a sitter in patient's room all day. Writer also reports to Franklin Cardoza NP that patient appears to not be eating and drinking well and mucous membranes of the mouth being very dry. New orders entered.

## 2022-10-21 NOTE — PLAN OF CARE
Problem: Discharge Planning  Goal: Discharge to home or other facility with appropriate resources  Outcome: Progressing     Problem: ABCDS Injury Assessment  Goal: Absence of physical injury  Outcome: Progressing     Problem: Skin/Tissue Integrity  Goal: Absence of new skin breakdown  Description: 1. Monitor for areas of redness and/or skin breakdown  2. Assess vascular access sites hourly  3. Every 4-6 hours minimum:  Change oxygen saturation probe site  4. Every 4-6 hours:  If on nasal continuous positive airway pressure, respiratory therapy assess nares and determine need for appliance change or resting period.   Outcome: Progressing     Problem: Safety - Adult  Goal: Free from fall injury  Outcome: Progressing     Problem: Nutrition Deficit:  Goal: Optimize nutritional status  Outcome: Progressing     Problem: Pain  Goal: Verbalizes/displays adequate comfort level or baseline comfort level  Outcome: Progressing     Problem: Metabolic/Fluid and Electrolytes - Adult  Goal: Electrolytes maintained within normal limits  Outcome: Progressing     Problem: Metabolic/Fluid and Electrolytes - Adult  Goal: Glucose maintained within prescribed range  Outcome: Progressing

## 2022-10-21 NOTE — PROGRESS NOTES
Pt woke up for evening meds, started to get a little combative and trying to climb out of the bed on all sides, almost getting her leg caught in the rail. Staff at bedside for safety. PRN ativan given. Currently eating with staff assist while trying to climb out of bed and chewing on fingers, putting food almost in food. Will continue to monitor.

## 2022-10-21 NOTE — PROGRESS NOTES
Reassessment completed at this time. Vitals taken and documented. Sitter in room. Patient resting with eyes closed. Call light and bed side table within reach.

## 2022-10-21 NOTE — PROGRESS NOTES
Overall patient appears to be doing well. Sodium and labs maintaining stable spoke with Danielle Warren the nurse practitioner  Encourage oral intake of liquids.   Will sign off please call with any questions or concerns    Sahara Solomon MD

## 2022-10-21 NOTE — PROGRESS NOTES
Writer reports to ISAAC Jack that patient had 310cc in bladder scan. New order for Arnold catheter to be re-inserted.

## 2022-10-21 NOTE — PROGRESS NOTES
Pt family came to ask about orders especially insulin, told him that staff is behind with ER and unit discharges but that they are aware that his sugar was over 400. He had some questions about wound vac and patient ambulation seeing he \"wants to go home\" over going to rehab. Answered questions the best I could without knowing for sure the plan upon discharge but told him that someone would go over everything they need to know before discharge.

## 2022-10-22 LAB
ANION GAP SERPL CALCULATED.3IONS-SCNC: 8 MMOL/L (ref 9–17)
BUN BLDV-MCNC: 7 MG/DL (ref 8–23)
BUN/CREAT BLD: 13 (ref 9–20)
CALCIUM SERPL-MCNC: 8.3 MG/DL (ref 8.6–10.4)
CHLORIDE BLD-SCNC: 106 MMOL/L (ref 98–107)
CO2: 27 MMOL/L (ref 20–31)
CREAT SERPL-MCNC: 0.54 MG/DL (ref 0.5–0.9)
GFR SERPL CREATININE-BSD FRML MDRD: >60 ML/MIN/1.73M2
GLUCOSE BLD-MCNC: 124 MG/DL (ref 74–100)
GLUCOSE BLD-MCNC: 145 MG/DL (ref 74–100)
GLUCOSE BLD-MCNC: 152 MG/DL (ref 70–99)
GLUCOSE BLD-MCNC: 153 MG/DL (ref 74–100)
GLUCOSE BLD-MCNC: 173 MG/DL (ref 74–100)
POTASSIUM SERPL-SCNC: 4.1 MMOL/L (ref 3.7–5.3)
SODIUM BLD-SCNC: 141 MMOL/L (ref 135–144)

## 2022-10-22 PROCEDURE — 6370000000 HC RX 637 (ALT 250 FOR IP): Performed by: FAMILY MEDICINE

## 2022-10-22 PROCEDURE — 6360000002 HC RX W HCPCS: Performed by: INTERNAL MEDICINE

## 2022-10-22 PROCEDURE — 80048 BASIC METABOLIC PNL TOTAL CA: CPT

## 2022-10-22 PROCEDURE — 6360000002 HC RX W HCPCS: Performed by: NURSE PRACTITIONER

## 2022-10-22 PROCEDURE — 99232 SBSQ HOSP IP/OBS MODERATE 35: CPT | Performed by: INTERNAL MEDICINE

## 2022-10-22 PROCEDURE — 1200000000 HC SEMI PRIVATE

## 2022-10-22 PROCEDURE — 36415 COLL VENOUS BLD VENIPUNCTURE: CPT

## 2022-10-22 PROCEDURE — 6370000000 HC RX 637 (ALT 250 FOR IP): Performed by: NURSE PRACTITIONER

## 2022-10-22 PROCEDURE — 97110 THERAPEUTIC EXERCISES: CPT

## 2022-10-22 PROCEDURE — 94761 N-INVAS EAR/PLS OXIMETRY MLT: CPT

## 2022-10-22 PROCEDURE — 82947 ASSAY GLUCOSE BLOOD QUANT: CPT

## 2022-10-22 PROCEDURE — 2580000003 HC RX 258: Performed by: NURSE PRACTITIONER

## 2022-10-22 PROCEDURE — 51702 INSERT TEMP BLADDER CATH: CPT

## 2022-10-22 RX ORDER — LORAZEPAM 2 MG/ML
1 INJECTION INTRAMUSCULAR ONCE
Status: COMPLETED | OUTPATIENT
Start: 2022-10-22 | End: 2022-10-22

## 2022-10-22 RX ADMIN — HALOPERIDOL 5 MG: 5 TABLET ORAL at 13:13

## 2022-10-22 RX ADMIN — CEFAZOLIN 2000 MG: 2 INJECTION, POWDER, FOR SOLUTION INTRAMUSCULAR; INTRAVENOUS at 03:48

## 2022-10-22 RX ADMIN — DIVALPROEX SODIUM 125 MG: 125 CAPSULE ORAL at 14:46

## 2022-10-22 RX ADMIN — CEFAZOLIN 2000 MG: 2 INJECTION, POWDER, FOR SOLUTION INTRAMUSCULAR; INTRAVENOUS at 11:56

## 2022-10-22 RX ADMIN — HALOPERIDOL 5 MG: 5 TABLET ORAL at 20:31

## 2022-10-22 RX ADMIN — CHLORHEXIDINE GLUCONATE 0.12% ORAL RINSE 15 ML: 1.2 LIQUID ORAL at 08:17

## 2022-10-22 RX ADMIN — RISPERIDONE 1 MG: 1 TABLET, FILM COATED ORAL at 08:16

## 2022-10-22 RX ADMIN — RISPERIDONE 1 MG: 1 TABLET, FILM COATED ORAL at 20:31

## 2022-10-22 RX ADMIN — POTASSIUM CHLORIDE 10 MEQ: 10 TABLET, EXTENDED RELEASE ORAL at 08:17

## 2022-10-22 RX ADMIN — PRAVASTATIN SODIUM 40 MG: 20 TABLET ORAL at 08:16

## 2022-10-22 RX ADMIN — BENZTROPINE MESYLATE 1 MG: 1 TABLET ORAL at 08:16

## 2022-10-22 RX ADMIN — HALOPERIDOL 5 MG: 5 TABLET ORAL at 08:16

## 2022-10-22 RX ADMIN — METOPROLOL TARTRATE 50 MG: 50 TABLET, FILM COATED ORAL at 08:16

## 2022-10-22 RX ADMIN — NYSTATIN 500000 UNITS: 100000 SUSPENSION ORAL at 08:17

## 2022-10-22 RX ADMIN — LORAZEPAM 1 MG: 2 INJECTION INTRAMUSCULAR; INTRAVENOUS at 14:45

## 2022-10-22 RX ADMIN — NYSTATIN 500000 UNITS: 100000 SUSPENSION ORAL at 20:30

## 2022-10-22 RX ADMIN — METOPROLOL TARTRATE 50 MG: 50 TABLET, FILM COATED ORAL at 20:30

## 2022-10-22 RX ADMIN — DOCUSATE SODIUM 100 MG: 100 CAPSULE, LIQUID FILLED ORAL at 20:30

## 2022-10-22 RX ADMIN — LORAZEPAM 0.5 MG: 0.5 TABLET ORAL at 20:30

## 2022-10-22 RX ADMIN — BENZTROPINE MESYLATE 1 MG: 1 TABLET ORAL at 20:30

## 2022-10-22 RX ADMIN — PANTOPRAZOLE SODIUM 40 MG: 40 TABLET, DELAYED RELEASE ORAL at 08:16

## 2022-10-22 RX ADMIN — CHLORHEXIDINE GLUCONATE 0.12% ORAL RINSE 15 ML: 1.2 LIQUID ORAL at 20:30

## 2022-10-22 RX ADMIN — LORAZEPAM 0.5 MG: 0.5 TABLET ORAL at 13:48

## 2022-10-22 RX ADMIN — DOCUSATE SODIUM 100 MG: 100 CAPSULE, LIQUID FILLED ORAL at 08:16

## 2022-10-22 RX ADMIN — SODIUM CHLORIDE: 9 INJECTION, SOLUTION INTRAVENOUS at 08:18

## 2022-10-22 RX ADMIN — CEFAZOLIN 2000 MG: 2 INJECTION, POWDER, FOR SOLUTION INTRAMUSCULAR; INTRAVENOUS at 20:30

## 2022-10-22 RX ADMIN — DIVALPROEX SODIUM 125 MG: 125 CAPSULE ORAL at 08:17

## 2022-10-22 RX ADMIN — ASPIRIN 81 MG: 81 TABLET, COATED ORAL at 08:17

## 2022-10-22 RX ADMIN — SODIUM CHLORIDE: 9 INJECTION, SOLUTION INTRAVENOUS at 22:08

## 2022-10-22 RX ADMIN — THIAMINE HCL TAB 100 MG 100 MG: 100 TAB at 08:17

## 2022-10-22 RX ADMIN — FOLIC ACID 1 MG: 1 TABLET ORAL at 08:16

## 2022-10-22 RX ADMIN — DIVALPROEX SODIUM 125 MG: 125 CAPSULE ORAL at 20:31

## 2022-10-22 RX ADMIN — SENNOSIDES 8.6 MG: 8.6 TABLET, FILM COATED ORAL at 20:30

## 2022-10-22 NOTE — PLAN OF CARE
Problem: ABCDS Injury Assessment  Goal: Absence of physical injury  10/21/2022 2144 by Sravanthi Millard RN  Outcome: Progressing  Note: Patient is encouraged to reposition and assessed for any injuries. Will continue to monitor this shift. Problem: Skin/Tissue Integrity  Goal: Absence of new skin breakdown  Description: 1. Monitor for areas of redness and/or skin breakdown  2. Assess vascular access sites hourly  3. Every 4-6 hours minimum:  Change oxygen saturation probe site  4. Every 4-6 hours:  If on nasal continuous positive airway pressure, respiratory therapy assess nares and determine need for appliance change or resting period. 10/21/2022 2144 by Sravanthi Millard RN  Outcome: Progressing  Note: Patient able to reposition self at this time and use call light appropriately for any assistance. Will continue to monitor this shift      Problem: Safety - Adult  Goal: Free from fall injury  10/21/2022 2144 by Sravanthi Millard RN  Outcome: Progressing  Note: Bed alarm on, bed locked, side rails up, gripper socks on, call light within reach, sitter at bedside. Will continue to monitor this shift. Problem: Nutrition Deficit:  Goal: Optimize nutritional status  10/21/2022 2144 by Sravanthi Millard RN  Outcome: Progressing  Flowsheets (Taken 10/21/2022 2144)  Nutrient intake appropriate for improving, restoring, or maintaining nutritional needs: Assess nutritional status and recommend course of action  Note: Encouraging foods and liquids as tolerated. Will continue to monitor this shift.       Problem: Pain  Goal: Verbalizes/displays adequate comfort level or baseline comfort level  10/21/2022 2144 by Sravanthi Millard RN  Outcome: Progressing  Flowsheets  Taken 10/21/2022 2144  Verbalizes/displays adequate comfort level or baseline comfort level:   Encourage patient to monitor pain and request assistance   Administer analgesics based on type and severity of pain and evaluate response   Implement non-pharmacological measures as appropriate and evaluate response   Assess pain using appropriate pain scale  Taken 10/21/2022 1839  Verbalizes/displays adequate comfort level or baseline comfort level: Encourage patient to monitor pain and request assistance  Note: Patient shows no signs/symptoms of pain at this time. Will continue to monitor this shift.       Problem: Metabolic/Fluid and Electrolytes - Adult  Goal: Electrolytes maintained within normal limits  10/21/2022 2144 by Ju Koehler RN  Outcome: Progressing  Flowsheets  Taken 10/21/2022 2144  Electrolytes maintained within normal limits: Monitor labs and assess patient for signs and symptoms of electrolyte imbalances  Taken 10/21/2022 1839  Electrolytes maintained within normal limits: Monitor labs and assess patient for signs and symptoms of electrolyte imbalances     Problem: Metabolic/Fluid and Electrolytes - Adult  Goal: Glucose maintained within prescribed range  10/21/2022 2144 by Ju Koehler RN  Outcome: Progressing  Flowsheets  Taken 10/21/2022 2144  Glucose maintained within prescribed range:   Monitor blood glucose as ordered   Assess for signs and symptoms of hyperglycemia and hypoglycemia  Taken 10/21/2022 1839  Glucose maintained within prescribed range: Monitor blood glucose as ordered
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
## 2022-10-22 NOTE — PROGRESS NOTES
Patient resting comfortably at this time. Writer performed bed bath and patient tolerated well but was pulling away at times. Patient does not appear to be in pain. Patient is not verbal to writer at this time due to sleeping. Writer at bedside to be a sitter. Bed alarm on, bed locked, call light within reach. Will continue to monitor this shift.

## 2022-10-22 NOTE — PROGRESS NOTES
Physical Therapy  Facility/Department: [facility] AT THE [facility]  Daily Treatment Note  NAME: Florentino Huang  : 1955  MRN: [id number]    Date of Service: 10/22/2022    Discharge Recommendations:  [address]      Patient Diagnosis(es): The primary encounter diagnosis was Hypernatremia. Diagnoses of Dehydration and Altered mental status, unspecified altered mental status type were also pertinent to this visit. Assessment   Assessment: Co treated with VO. PROM provided B LE's 1 x 15. No active participation. Occasional resistance with cues for relaxing with good carryover. Pt eyes closed throughout. PTA provided encouragement to attempt response for achieving sitting in chair with no response. Activity Tolerance: Patient limited by fatigue;Treatment limited secondary to decreased cognition     Plan    Physcial Therapy Plan  General Plan: 2 times a day 7 days a week  Current Treatment Recommendations: Strengthening;ROM;Balance training;Functional mobility training;Transfer training;ADL/Self-care training;Neuromuscular re-education;Gait training; Safety education & training;Patient/Caregiver education & training     Restrictions  Restrictions/Precautions  Restrictions/Precautions: General Precautions, Fall Risk  Required Braces or Orthoses?: No     Subjective    Subjective  Subjective: Pt in bed upon arrival with sitter present. Pt very lethargic with eyes closed. Orientation  Overall Orientation Status: Impaired  Orientation Level: Unable to assess     Objective      Bed Mobility Training  Bed Mobility Training: No  Transfer Training  Transfer Training: No  Gait Training  Gait Training: No  Neuromuscular Education  Neuromuscular Education: No  PT Exercises  Exercise Treatment: PROM provided B LE's 1 x 15     Safety Devices  Type of Devices: Call light within reach; Left in bed;Sitter present; Bed alarm in place  Restraints  Restraints Initially in Place: No     Goals  Short Term Goals  Time Frame for Short Term Goals: 3 DAYS  Short Term Goal 1: MOD ASSIST BED MOBILITY  Short Term Goal 2: MIN ASSIST TRANSFERS. Long Term Goals  Time Frame for Long Term Goals : 4 WEEKS  Long Term Goal 1: MIN ASSIST TRANSFERS AND MIN ASSIST BED MOBILITY. Patient Goals   Patient Goals : UNABLE TO ASCERTAIN DUE TO IMPAIRED COGNITION AND LETHARGY.     Education  Patient Education  Education Given To: Patient  Education Provided Comments: Educated patient during PROM on benefits of ROM  Education Method: Verbal;Other (Comment) (Cues throughout on benefits)  Barriers to Learning: Cognition    Therapy Time   Individual Concurrent Group Co-treatment   Time In 0925         Time Out 0948         Minutes [address] [facility] Janell Samano

## 2022-10-22 NOTE — PROGRESS NOTES
Patient is resting in the bed at this time with eyes closed. Slight restlessness still noted at times. Writer remains at bedside as a sitter. Will continue to monitor.

## 2022-10-22 NOTE — PROGRESS NOTES
Took patient off PAP machine. Patient was getting agitated and trying to pull it off of her face. Respiratory was notified.

## 2022-10-22 NOTE — PROGRESS NOTES
Patient is restless in the bed at this time. Patient did eat a few bites of her dinner, Travis Perez will continue to try to get patient to eat more. Patient able to follow very simple commands and was able to state \"22\" when asked the year and was able to state her name. Patient does deny pain at this time. Vitals and assessment as charted. Bed alarm on, bed locked, writer at bedside as a sitter, call light within reach. Will continue to monitor this shift.

## 2022-10-22 NOTE — PROGRESS NOTES
Writer attempted to feed patient lunch at this time but patient will not wake up enough to take anything by mouth. Writer sat patient up and tried uncovering patient, turning the lights on and moving the patient in bed and patient is responsive to the movement but still not awake enough to eat or drink anything. Will reattempt to feed patient lunch later in the shift.

## 2022-10-22 NOTE — PLAN OF CARE
Problem: Discharge Planning  Goal: Discharge to home or other facility with appropriate resources  Outcome: Progressing  Flowsheets  Taken 10/22/2022 0352 by Debi Barry RN  Discharge to home or other facility with appropriate resources: Identify barriers to discharge with patient and caregiver  Taken 10/21/2022 1839 by Debi Barry RN  Discharge to home or other facility with appropriate resources: Identify barriers to discharge with patient and caregiver  Note: Case management working with the patient. Discharge plan is pending. Problem: ABCDS Injury Assessment  Goal: Absence of physical injury  10/22/2022 9702 by Leticia Jenkins RN  Outcome: Progressing     Problem: Skin/Tissue Integrity  Goal: Absence of new skin breakdown  Description: 1. Monitor for areas of redness and/or skin breakdown  2. Assess vascular access sites hourly  3. Every 4-6 hours minimum:  Change oxygen saturation probe site  4. Every 4-6 hours:  If on nasal continuous positive airway pressure, respiratory therapy assess nares and determine need for appliance change or resting period. 10/22/2022 0659 by Leticia Jenkins RN  Outcome: Progressing  Note: No new skin breakdown noted. Problem: Safety - Adult  Goal: Free from fall injury  10/22/2022 0659 by Leticia Jenkins RN  Outcome: Progressing  Note: Fall assessment completed daily. Bed in lowest position. Call light within reach. Falling star posted to outside of door. Fall risk sticker in place on ID band. Side rails up 3/4. Bed alarm on for safety. Patient ambulates with a +2-3 assist.  Will continue to monitor.         Problem: Nutrition Deficit:  Goal: Optimize nutritional status  10/22/2022 0659 by Leticia Jenkins RN  Outcome: Progressing     Problem: Pain  Goal: Verbalizes/displays adequate comfort level or baseline comfort level  10/22/2022 0659 by Leticia Jenkins RN  Outcome: Progressing  Flowsheets (Taken 10/22/2022 0352 by Debi Barry RN)  Verbalizes/displays adequate comfort level or baseline comfort level: Encourage patient to monitor pain and request assistance  Note: FLACC score 0 at this time. Problem: Metabolic/Fluid and Electrolytes - Adult  Goal: Electrolytes maintained within normal limits  10/22/2022 0659 by Renata Sherman RN  Outcome: Progressing  Flowsheets (Taken 10/22/2022 0352 by Aminta Lora RN)  Electrolytes maintained within normal limits: Monitor labs and assess patient for signs and symptoms of electrolyte imbalances  Note: See labs in chart.       Problem: Metabolic/Fluid and Electrolytes - Adult  Goal: Glucose maintained within prescribed range  10/22/2022 0659 by Renata Sherman RN  Outcome: Progressing  Flowsheets (Taken 10/22/2022 0352 by Aminta Lora RN)  Glucose maintained within prescribed range: Monitor blood glucose as ordered

## 2022-10-22 NOTE — PROGRESS NOTES
[facility] - Progress Note  - Telemedicine  Today's Date and Time: 10/22/2022, 6:28 PM    Impression :   MSSA septicemia 10-11-22 x 2  Lt parotitis from dehydration and MSSA  Dehydration  Hypernatremia  Altered mental status  SHAINA- Resolved  Cirrhosis of the liver  Major depression with psychotic features  Oral candidiasis        Patient evaluated by Telemedicine. Requesting Institution: [facility]  Provider Institution: [address] [place] [address],[address] at [facility] [place]: Ms Ranjana Cadenaley, APRN- IM    Recommendations:     Continue cefazolin 2 gm IV q 8 hr. Stop date 11-13-22  Alternatively she could receive Ceftriaxone 2 gm IV q 24 hr until 11-13-22, if the facility where she is going can not accommodate IV infusions 3 times daily. Repeat blood cultures x 2 on 11-18-22 to document clearing of septicemia  Oral nystatin  Medical Decision Making/Summary/Discussion:10/22/2022       Infection Control Recommendations   [place]ission Precautions    Antimicrobial Stewardship Recommendations     Simplification of therapy  Targeted therapy    Coordination of Outpatient Care:   Estimated Length of IV antimicrobials:11-13-22  Patient will need Midline Catheter Insertion: yes  Patient will need PICC line Insertion:no  Patient will need: Home IV , [place],  SNF,  LTAC:  Yes  Patient will need outpatient wound care:No    Chief complaint/reason for consultation:   MSSA septicemia      History of Present Illness:   Rivera Levy is a 74y.o.-year-old  female who was initially admitted on 10/11/2022. Patient seen at the request of     INITIAL HISTORY:     Patient was transferred to [facility] fro [place] on 10-11-22 because of altered mental status. She was found to be severely hypernatremic with a Na of 161, dehydrated and in SHAINA. She has received hydration with improvement. Has been evaluated by Nephrology.      She has a past Hx of ETOH intake, cirrhosis of the liver with ascites, CAD, essential HTN, DM 2, GERD, bladder incontinence. ID service asked to evaluate because of S aureus septicemia on 10-13-22. CURRENT EVALUATION :10/22/2022  /67   Pulse 62   Temp 96.8 °F (36 °C) (Temporal)   Resp 20   Ht 5' 6\" (1.676 m)   Wt 153 lb 9.6 oz (69.7 kg) Comment: was 158 with SCD pumps on bed, and 153.6 without. SpO2 96%   BMI 24.79 kg/m²     Afebrile  VS stable    Patient has become agitated and has been attempting to pull tubes out during the day. Acting aggressively towards the nurses. Has required intermittent sedation    Altered mentation continues with a sitter in the room    There is no growth on repeat blood cultures from 10/18/22  Midline placed for outpatient antibiotics    Discharge planning underway back to SNF once situation improves      Labs, X rays reviewed: 10/22/2022    BUN: 12-->8-->6-->4-->7  Cr:0.58-->0.73-->0.79-->0.54  Na 145-->147-->141  K  4.6-->3.8-->4.1    WBC: 8.3-->7.5  Hb:12.4  Plat: 80-->97    Cultures:  Urine:    Blood:  10-11-22: MSSA x 2  10-18-22: No growth   Sputum :    Wound:      Discussed with VICK RN. I have personally reviewed the past medical history, past surgical history, medications, social history, and family history, and I have updated the database accordingly. Past Medical History:     Past Medical History:   Diagnosis Date    Alcohol dependence in remission [facility])     Alcoholic cirrhosis of liver without ascites (HCC)     ASHD (arteriosclerotic heart disease)     Bacteremia due to Staphylococcus aureus 10/13/2022    Depression     Diabetes mellitus (HCC)     GERD (gastroesophageal reflux disease)     Hepatitis C     WITH FULL RECOVERY - INFECTIOUS DISEASE DR. Yusuf Carranza - LAST VISIT 7/2020    Hyperlipidemia     Hypertension     Irregular heartbeat     Murmur, cardiac     noted by PCP, no echo per patient    Urinary bladder incontinence     Wears eyeglasses     Well adult health check     PCP - DR. Jesus Horton - 3/2021    Well adult health check     INFECTIOUS DISEASE - DR. Teresa Marques    Well adult health check     GI - DR. Kemar LEONARD       Past Surgical  History:     Past Surgical History:   Procedure Laterality Date    BREAST SURGERY Right     REMOVAL BENIGN TUMOR    COLONOSCOPY      HIP SURGERY Right     RECONSTRUCTION    TONSILLECTOMY      AS A CHILD    TUBAL LIGATION      VITRECTOMY Left 2021    VITRECTOMY Left 2021    VITRECTOMY 25 GAUGE, MEMBRANE PEEL, ICG performed by Drew Simpson MD at [facility] OR       Medications:      haloperidol  5 mg Oral TID    risperiDONE  1 mg Oral BID    lidocaine 1 % injection  5 mL IntraDERmal Once    sodium chloride flush  5-40 mL IntraVENous 2 times per day    docusate sodium  100 mg Oral BID    senna  1 tablet Oral Nightly    chlorhexidine  15 mL Mouth/Throat BID    nystatin  5 mL Oral 4x Daily    ceFAZolin  2,000 mg IntraVENous Q8H    insulin lispro  0-8 Units SubCUTAneous TID WC    insulin lispro  0-4 Units SubCUTAneous Nightly    aspirin  81 mg Oral Daily    benztropine  1 mg Oral BID    divalproex  125 mg Oral TID    folic acid  1 mg Oral Daily    metoprolol tartrate  50 mg Oral BID    pantoprazole  40 mg Oral QAM AC    potassium chloride  10 mEq Oral Daily    pravastatin  40 mg Oral Daily    vitamin B-1  100 mg Oral Daily    VORTIoxetine HBr  20 mg Oral Daily    sodium chloride flush  5-40 mL IntraVENous 2 times per day       Social History:     Social History     Socioeconomic History    Marital status: Single     Spouse name: Not on file    Number of children: Not on file    Years of education: Not on file    Highest education level: Not on file   Occupational History    Not on file   Tobacco Use    Smoking status: Former     Types: Cigarettes     Quit date:      Years since quittin.8    Smokeless tobacco: Never   Vaping Use    Vaping Use: Never used   Substance and Sexual Activity Alcohol use: Not on file    Drug use: Not on file    Sexual activity: Not on file   Other Topics Concern    Not on file   Social History Narrative    Not on file     Social Determinants of Health     Financial Resource Strain: Not on file   Food Insecurity: Not on file   Transportation Needs: Not on file   Physical Activity: Not on file   Stress: Not on file   Social Connections: Not on file   Intimate Partner Violence: Not on file   Housing Stability: Not on file       Family History:   History reviewed. No pertinent family history. Allergies:   Lisinopril     Review of Systems:     Patient unable to provide ROS. Confused and agitated. 10/22/2022        Constitutional: No fevers or chills. No systemic complaints  Head: No headaches  Eyes: No double vision or blurry vision. No conjunctival inflammation. ENT: No sore throat or runny nose. . No hearing loss, tinnitus or vertigo. Cardiovascular: No chest pain or palpitations. No shortness of breath. No TESFAYE  Lung: No shortness of breath or cough. No sputum production  Abdomen: No nausea, vomiting, diarrhea, or abdominal pain. Harden Beech No cramps. Genitourinary: No increased urinary frequency, or dysuria. No hematuria. No suprapubic or CVA pain  Musculoskeletal: No muscle aches or pains. No joint effusions, swelling or deformities  Hematologic: No bleeding or bruising. Neurologic: No headache, weakness, numbness, or tingling. Integument: No rash, no ulcers. Psychiatric: No depression. Endocrine: No polyuria, no polydipsia, no polyphagia.     Physical Examination :   Patient Vitals for the past 8 hrs:   BP Temp Temp src Pulse Resp SpO2   10/22/22 1821 131/67 96.8 °F (36 °C) Temporal 62 20 96 %   10/22/22 1319 124/71 97.2 °F (36.2 °C) Temporal 57 18 98 %       General Appearance: Awake, mentation altered and in no apparent distress  Head:  Normocephalic, no trauma  Eyes: Pupils equal, round, reactive to light and accommodation; extraocular movements intact; sclera anicteric; conjunctivae pink. No embolic phenomena. ENT: Oropharynx clear, without erythema, exudate, or thrush. No tenderness of sinuses. Mouth/throat: mucosa pink and moist. No lesions. Lt side parotitis   Neck:Supple, without lymphadenopathy. Thyroid normal, No bruits. Pulmonary/Chest: Clear to auscultation, without wheezes, rales, or rhonchi. No dullness to percussion. Cardiovascular: Regular rate and rhythm without murmurs, rubs, or gallops. Abdomen: Soft, non tender. Bowel sounds normal. No organomegaly. Ascites. All four Extremities: No cyanosis, clubbing, edema, or effusions. Neurologic: No gross sensory or motor deficits. Confused and agitated . Has tremors  Skin: Warm and dry with good turgor. No signs of peripheral arterial or venous insufficiency. No ulcerations. No open wounds. Medical Decision Making -Laboratory:   I have independently reviewed/ordered the following labs:    CBC with Differential:   No results for input(s): WBC, HGB, HCT, PLT, SEGSPCT, BANDSPCT, LYMPHOPCT, MONOPCT, EOSPCT in the last 72 hours. BMP:   Recent Labs     10/21/22  0530 10/22/22  0737    141   K 3.8 4.1    106   CO2 27 27   BUN 4* 7*   CREATININE 0.57 0.54       Hepatic Function Panel:   Recent Labs     10/20/22  0540   PROT 5.9*   LABALBU 3.1*   BILITOT 0.3   ALKPHOS 69   ALT 12   AST 24       No results for input(s): RPR in the last 72 hours. No results for input(s): HIV in the last 72 hours. No results for input(s): BC in the last 72 hours. Lab Results   Component Value Date/Time    MUCUS TRACE 10/11/2022 07:00 PM    RBC 4.23 10/18/2022 06:20 AM    WBC 7.5 10/18/2022 06:20 AM    TURBIDITY Clear 10/11/2022 07:00 PM     Lab Results   Component Value Date/Time    CREATININE 0.54 10/22/2022 07:37 AM    GLUCOSE 152 10/22/2022 07:37 AM       Medical Decision Making-Imaging:     EXAMINATION:   ONE XRAY VIEW OF THE CHEST       10/11/2022 10:30 am       COMPARISON:   None.        HISTORY:   ORDERING SYSTEM PROVIDED HISTORY: altered mental status   TECHNOLOGIST PROVIDED HISTORY:   altered mental status       FINDINGS:   The heart is not enlarged. No pulmonary venous congestion or edema. No   convincing lung consolidation or infiltrate. No pleural effusion or   pneumothorax. Osseous structures grossly intact. Impression   No acute cardiopulmonary process     Medical Decision [id number]:       Medical Decision Making-Other:     Note:  Labs, medications, radiologic studies were reviewed with personal review of films  Large amounts of data were reviewed  Discussed with nursing Staff, Discharge planner  Infection Control and Prevention measures reviewed  All prior entries were reviewed  Administer medications as ordered  Prognosis: Guarded  Discharge planning reviewed  Follow up as outpatient. Thank you for allowing us to participate in the care of this patient. Please call with questions.     Lupis Munroe MD          Pager: [phone] - Office: [phone]

## 2022-10-22 NOTE — PROGRESS NOTES
Shift assessment and vitals obtained at this time as charted. Vitals are WNL, FLACC score 0. Patient is intermittently restless in the bed during assessment but not not open her eyes or interact with writer at all. Lung sounds clear to diminished throughout. Heart sounds regular. Abdomen soft and rounded with active bowel sounds throughout. Arnold catheter remains in place and is draining freely. No edema noted. Skin is warm and dry with scattered abrasions and bruising. PICC line remains in place to right upper arm and is infusing. Writer remains at bedside as a sitter. Will continue to monitor.

## 2022-10-22 NOTE — PROGRESS NOTES
[facility] SPECIALTY [facility]  SPEECH THERAPY  No Visit Note    [] ICU    [x] Acute   Patient: Marge Justice  Room: 8302/1882-00      Marge Justice not seen on 10/22/2022 at 12:45 PM due to SLP checked in with patient and 1:1 RN three separate occasions this date. Patient sleeping heavily with eyes closed, intermittent snoring. Patient not alert or appropriate for therapy/PO intake at this time. Nursing withholding meal tray until patient is alert and able to tolerate. SLP will re-attempt follow up as appropriate.          Signature: Khai Peters M.S., CCC-SLP

## 2022-10-22 NOTE — PROGRESS NOTES
Progress Note    SUBJECTIVE:  FU related to denies any shortness of breath. Denies any pain. Definitely some confusion    OBJECTIVE:    Vitals:   TEMPERATURE:  Current - Temp: 97.2 °F (36.2 °C);  Max - Temp  Av.6 °F (35.9 °C)  Min: 96.2 °F (35.7 °C)  Max: 97.2 °F (36.2 °C)  RESPIRATIONS RANGE: Resp  Av.8  Min: 18  Max: 20  PULSE RANGE: Pulse  Av.5  Min: 61  Max: 68  BLOOD PRESSURE RANGE:  Systolic (03XEM), JIC:678 , Min:100 , PZD:071   ; Diastolic (44SAL), ZIO:34, Min:60, Max:75    PULSE OXIMETRY RANGE: SpO2  Av.5 %  Min: 95 %  Max: 98 %  24HR INTAKE/OUTPUT:    Intake/Output Summary (Last 24 hours) at 10/22/2022 0903  Last data filed at 10/22/2022 0640  Gross per 24 hour   Intake 1484 ml   Output 1100 ml   Net 384 ml     -----------------------------------------------------------------  Exam:  General: Sleepy and alert  HEENT: Supple neck & negative  Heart: Regular  Lungs: clear to auscultation bilaterally & no retractions  Abdomen: Normal & soft, No tenderness and BS normal  Extremities:  No edema   Neuro: NonFocal     -----------------------------------------------------------------  Diagnostic Data:  Lab Results   Component Value Date    WBC 7.5 10/18/2022    HGB 12.4 10/18/2022    PLT See Reflexed IPF Result 10/18/2022       Lab Results   Component Value Date    BUN 7 (L) 10/22/2022    CREATININE 0.54 10/22/2022     10/22/2022    K 4.1 10/22/2022    CALCIUM 8.3 (L) 10/22/2022     10/22/2022    CO2 27 10/22/2022    LABGLOM >60 10/22/2022       Lab Results   Component Value Date    WBCUA 2 TO 5 10/11/2022    RBCUA 2 TO 5 10/11/2022    EPITHUA 0 TO 2 10/11/2022    LEUKOCYTESUR NEGATIVE 10/11/2022    SPECGRAV 1.020 10/11/2022    GLUCOSEU 3+ (A) 10/11/2022    KETUA NEGATIVE 10/11/2022    PROTEINU NEGATIVE 10/11/2022    HGBUR 1+ (A) 10/11/2022    BACTERIA 1+ (A) 10/11/2022       No results found for: MYOGLOBIN, TROPONINT, CKTOTAL, CKMB, PROBNP    CT Head W/O Contrast    Result Date: is not enlarged. No pulmonary venous congestion or edema. No convincing lung consolidation or infiltrate. No pleural effusion or pneumothorax. Osseous structures grossly intact. No acute cardiopulmonary process       ASSESSMENT:    Principal Problem:    Hypernatremia  Active Problems:    Dehydration    Major depressive disorder with psychotic features (HCC)    Type 2 diabetes mellitus without complication (HCC)    Essential hypertension    ASHD (arteriosclerotic heart disease)    Gastroesophageal reflux disease without esophagitis    Bacteremia due to Staphylococcus aureus    MSSA (methicillin susceptible Staphylococcus aureus) septicemia (HCC)    Parotitis, acute    SHAINA (acute kidney injury) ([address]    Alcoholic cirrhosis of liver with ascites (HCC)    Major depressive disorder    ALEXX (obstructive sleep apnea)-witnessed    Hypokalemia  Resolved Problems:    * No resolved hospital problems. *      Patient Active Problem List    Diagnosis Date Noted    Hypokalemia 10/19/2022    ALEXX (obstructive sleep apnea)-witnessed 10/18/2022    MSSA (methicillin susceptible Staphylococcus aureus) septicemia ([address] 10/17/2022    Parotitis, acute 10/17/2022    SHAINA (acute kidney injury) ([address] 50/93/3371    Alcoholic cirrhosis of liver with ascites ([address] 10/17/2022    Major depressive disorder 10/17/2022    Bacteremia due to Staphylococcus aureus 10/13/2022    Hypernatremia 10/11/2022    Dehydration 10/11/2022    Major depressive disorder with psychotic features ([address] 10/11/2022    Type 2 diabetes mellitus without complication ([address] 71/79/6659    Essential hypertension 10/11/2022    ASHD (arteriosclerotic heart disease) 10/11/2022    Gastroesophageal reflux disease without esophagitis 10/11/2022    Macular pucker, left eye 04/22/2021       PLAN:  Electrolyte correction.   Critical Care Time: 0  Antibiotics  Placement      Rai Gifford MD , MROSSANA.

## 2022-10-22 NOTE — PLAN OF CARE
Problem: ABCDS Injury Assessment  Goal: Absence of physical injury  10/22/2022 1841 by Josefina Quiros RN  Outcome: Progressing  Note: Patient is encouraged to reposition and assessed for any injuries. Will continue to monitor this shift. Problem: Skin/Tissue Integrity  Goal: Absence of new skin breakdown  Description: 1. Monitor for areas of redness and/or skin breakdown  2. Assess vascular access sites hourly  3. Every 4-6 hours minimum:  Change oxygen saturation probe site  4. Every 4-6 hours:  If on nasal continuous positive airway pressure, respiratory therapy assess nares and determine need for appliance change or resting period. 10/22/2022 1841 by Josefina Quiros RN  Outcome: Progressing  Note: Patient able to reposition self at this time and use call light appropriately for any assistance. Will continue to monitor this shift      Problem: Safety - Adult  Goal: Free from fall injury  10/22/2022 1841 by Josefina Quiros RN  Outcome: Progressing  Note: Bed alarm on, bed locked, side rails up, gripper socks on, call light within reach, sitter at bedside. Will continue to monitor this shift. Problem: Nutrition Deficit:  Goal: Optimize nutritional status  10/22/2022 1841 by Josefina Quiros RN  Outcome: Progressing  Flowsheets (Taken 10/22/2022 1841)  Nutrient intake appropriate for improving, restoring, or maintaining nutritional needs: Assess nutritional status and recommend course of action  Note: Encouraging foods and liquids as tolerated. Will continue to monitor this shift.      Problem: Pain  Goal: Verbalizes/displays adequate comfort level or baseline comfort level  10/22/2022 1841 by Josefina Quiros RN  Outcome: Progressing  Flowsheets  Taken 10/22/2022 1841  Verbalizes/displays adequate comfort level or baseline comfort level:   Encourage patient to monitor pain and request assistance   Assess pain using appropriate pain scale   Administer analgesics based on type and severity of pain and evaluate response   Implement non-pharmacological measures as appropriate and evaluate response  Taken 10/22/2022 1821  Verbalizes/displays adequate comfort level or baseline comfort level: Encourage patient to monitor pain and request assistance  Note: Patient denies any pain at this time. Will continue to monitor this shift.       Problem: Metabolic/Fluid and Electrolytes - Adult  Goal: Electrolytes maintained within normal limits  10/22/2022 1841 by Emmanuel Albarado RN  Outcome: Progressing  Flowsheets  Taken 10/22/2022 1841  Electrolytes maintained within normal limits: Monitor labs and assess patient for signs and symptoms of electrolyte imbalances  Taken 10/22/2022 1821  Electrolytes maintained within normal limits: Monitor labs and assess patient for signs and symptoms of electrolyte imbalances     Problem: Metabolic/Fluid and Electrolytes - Adult  Goal: Glucose maintained within prescribed range  10/22/2022 1841 by Emmanuel Albarado RN  Outcome: Progressing  Flowsheets  Taken 10/22/2022 1841  Glucose maintained within prescribed range:   Monitor blood glucose as ordered   Assess for signs and symptoms of hyperglycemia and hypoglycemia  Taken 10/22/2022 1821  Glucose maintained within prescribed range: Monitor blood glucose as ordered

## 2022-10-22 NOTE — PROGRESS NOTES
Patient is slightly calmer since ativan administration but is still slightly anxious and restless. Patient did drink a whole cup of water at this time. Family at bedside at this time and writer remains at bedside as a sitter. Will continue to monitor.

## 2022-10-22 NOTE — PROGRESS NOTES
Morning medications given at this time crushed in pudding. Patient ate about half of a pudding cup, a whole cup of applesauce and a couple bites of eggs. Patient also took several drinks of water from a spoon. Patient did not open her eyes but did talk to writer when asked very simple questions, although with delayed responses. Telemetry monitor also taken off patient at this time per order from Dr. Jolly Kenyon. Patient now resting in bed with eyes closed, will continue to monitor.

## 2022-10-22 NOTE — PROGRESS NOTES
Patient resting in bed comfortably at this time. Patient does not appear to be in pain and is not oriented or verbal to writer but does have eyes open and is restless at times. Vitals and assessment as charted. Bed alarm on, bed locked, call light within reach and writer remains at bedside for sitter. Will continue to monitor this shift.

## 2022-10-22 NOTE — PROGRESS NOTES
Occupational Therapy  Facility/Department: [facility] AT THE [facility] MED SURG  Daily Treatment Note  NAME: Sanjana Ramirez  : 1955  MRN: [id number]    Date of Service: 10/22/2022    Discharge Recommendations:  Continue to assess pending progress, Subacute/Skilled Nursing Facility         Patient Diagnosis(es): The primary encounter diagnosis was Hypernatremia. Diagnoses of Dehydration and Altered mental status, unspecified altered mental status type were also pertinent to this visit. Assessment    Activity Tolerance: Patient limited by fatigue;Treatment limited secondary to decreased cognition  Discharge Recommendations: Continue to assess pending progress;Subacute/Skilled Nursing Facility      Plan   Occupational Therapy Plan  Times Per Week: 7x/week  Times Per Day: Once a day  Current Treatment Recommendations: Strengthening; Endurance training;Balance training;Patient/Caregiver education & training; Safety education & training;Self-Care / ADL     Restrictions  Restrictions/Precautions  Restrictions/Precautions: General Precautions; Fall Risk    Subjective   Subjective  Subjective: Pt lying in bed with RN present. Pt very lethargic and difficult to awaken. Pain: Pt no complaints of pain this date. Objective    Vitals              OT Exercises  Exercise Treatment: Pt tolerated BUE PROM x 5 planes x 10 reps x 1 set to increase UE ROM and endurance in order to ease completion of ADL tasks. Pt's eyes closed throughout, however would occ respond to writer. Safety Devices  Type of Devices: Call light within reach; Left in bed;Sitter present; Bed alarm in place     Patient Education  Education Given To: Patient  Education Provided: Role of Therapy;Plan of Care  Education Method: Verbal  Barriers to Learning: Cognition  Education Outcome: Continued education needed    Goals  Short Term Goals  Time Frame for Short Term Goals: 10 visits  Short Term Goal 1: Pt will complete AROM/AAROM to BUE as tolerated to maintain joint range of motion for improved participation in ADL and functional transfers. Short Term Goal 2: Pt will complete functional transfers during ADL tasks modA to improve safety and participation in ADL tasks. Short Term Goal 3: Pt will participate in bathing tasks while seated in chair or upright in bed with moderate verbal prompting and modA.        Therapy Time   Individual Concurrent Group Co-treatment   Time In 0924         Time Out 0947         Minutes 23                 TAVO De Santiago/SWETHA

## 2022-10-23 LAB
CULTURE: NORMAL
CULTURE: NORMAL
GLUCOSE BLD-MCNC: 113 MG/DL (ref 74–100)
GLUCOSE BLD-MCNC: 120 MG/DL (ref 74–100)
GLUCOSE BLD-MCNC: 145 MG/DL (ref 74–100)
GLUCOSE BLD-MCNC: 154 MG/DL (ref 74–100)
Lab: NORMAL
Lab: NORMAL
SPECIMEN DESCRIPTION: NORMAL
SPECIMEN DESCRIPTION: NORMAL

## 2022-10-23 PROCEDURE — 6370000000 HC RX 637 (ALT 250 FOR IP): Performed by: FAMILY MEDICINE

## 2022-10-23 PROCEDURE — 1200000000 HC SEMI PRIVATE

## 2022-10-23 PROCEDURE — 82947 ASSAY GLUCOSE BLOOD QUANT: CPT

## 2022-10-23 PROCEDURE — 6370000000 HC RX 637 (ALT 250 FOR IP): Performed by: NURSE PRACTITIONER

## 2022-10-23 PROCEDURE — 94761 N-INVAS EAR/PLS OXIMETRY MLT: CPT

## 2022-10-23 PROCEDURE — 6360000002 HC RX W HCPCS: Performed by: NURSE PRACTITIONER

## 2022-10-23 RX ADMIN — CEFAZOLIN 2000 MG: 2 INJECTION, POWDER, FOR SOLUTION INTRAMUSCULAR; INTRAVENOUS at 11:41

## 2022-10-23 RX ADMIN — POTASSIUM CHLORIDE 10 MEQ: 10 TABLET, EXTENDED RELEASE ORAL at 07:55

## 2022-10-23 RX ADMIN — DIVALPROEX SODIUM 125 MG: 125 CAPSULE ORAL at 13:57

## 2022-10-23 RX ADMIN — BENZTROPINE MESYLATE 1 MG: 1 TABLET ORAL at 20:00

## 2022-10-23 RX ADMIN — NYSTATIN 500000 UNITS: 100000 SUSPENSION ORAL at 11:52

## 2022-10-23 RX ADMIN — SENNOSIDES 8.6 MG: 8.6 TABLET, FILM COATED ORAL at 19:59

## 2022-10-23 RX ADMIN — LORAZEPAM 0.5 MG: 0.5 TABLET ORAL at 02:34

## 2022-10-23 RX ADMIN — CEFAZOLIN 2000 MG: 2 INJECTION, POWDER, FOR SOLUTION INTRAMUSCULAR; INTRAVENOUS at 19:59

## 2022-10-23 RX ADMIN — FOLIC ACID 1 MG: 1 TABLET ORAL at 07:55

## 2022-10-23 RX ADMIN — DIVALPROEX SODIUM 125 MG: 125 CAPSULE ORAL at 07:55

## 2022-10-23 RX ADMIN — ASPIRIN 81 MG: 81 TABLET, COATED ORAL at 07:55

## 2022-10-23 RX ADMIN — DIVALPROEX SODIUM 125 MG: 125 CAPSULE ORAL at 19:59

## 2022-10-23 RX ADMIN — CHLORHEXIDINE GLUCONATE 0.12% ORAL RINSE 15 ML: 1.2 LIQUID ORAL at 20:00

## 2022-10-23 RX ADMIN — NYSTATIN 500000 UNITS: 100000 SUSPENSION ORAL at 20:00

## 2022-10-23 RX ADMIN — LORAZEPAM 0.5 MG: 0.5 TABLET ORAL at 13:58

## 2022-10-23 RX ADMIN — HALOPERIDOL 5 MG: 5 TABLET ORAL at 19:59

## 2022-10-23 RX ADMIN — CHLORHEXIDINE GLUCONATE 0.12% ORAL RINSE 15 ML: 1.2 LIQUID ORAL at 07:54

## 2022-10-23 RX ADMIN — HALOPERIDOL 5 MG: 5 TABLET ORAL at 13:57

## 2022-10-23 RX ADMIN — RISPERIDONE 1 MG: 1 TABLET, FILM COATED ORAL at 07:55

## 2022-10-23 RX ADMIN — BENZTROPINE MESYLATE 1 MG: 1 TABLET ORAL at 07:55

## 2022-10-23 RX ADMIN — METOPROLOL TARTRATE 50 MG: 50 TABLET, FILM COATED ORAL at 19:59

## 2022-10-23 RX ADMIN — DOCUSATE SODIUM 100 MG: 100 CAPSULE, LIQUID FILLED ORAL at 19:59

## 2022-10-23 RX ADMIN — LORAZEPAM 0.5 MG: 0.5 TABLET ORAL at 19:59

## 2022-10-23 RX ADMIN — CEFAZOLIN 2000 MG: 2 INJECTION, POWDER, FOR SOLUTION INTRAMUSCULAR; INTRAVENOUS at 04:12

## 2022-10-23 RX ADMIN — PANTOPRAZOLE SODIUM 40 MG: 40 TABLET, DELAYED RELEASE ORAL at 07:55

## 2022-10-23 RX ADMIN — THIAMINE HCL TAB 100 MG 100 MG: 100 TAB at 07:54

## 2022-10-23 RX ADMIN — HALOPERIDOL 5 MG: 5 TABLET ORAL at 07:55

## 2022-10-23 RX ADMIN — PRAVASTATIN SODIUM 40 MG: 20 TABLET ORAL at 07:54

## 2022-10-23 RX ADMIN — DOCUSATE SODIUM 100 MG: 100 CAPSULE, LIQUID FILLED ORAL at 07:55

## 2022-10-23 RX ADMIN — LORAZEPAM 0.5 MG: 0.5 TABLET ORAL at 07:55

## 2022-10-23 RX ADMIN — METOPROLOL TARTRATE 50 MG: 50 TABLET, FILM COATED ORAL at 07:55

## 2022-10-23 RX ADMIN — RISPERIDONE 1 MG: 1 TABLET, FILM COATED ORAL at 19:59

## 2022-10-23 ASSESSMENT — PAIN SCALES - GENERAL
PAINLEVEL_OUTOF10: 0
PAINLEVEL_OUTOF10: 0

## 2022-10-23 NOTE — PROGRESS NOTES
Pt resting in bed, sisters at bedside, afternoon medications given with some difficulty, oral care performed, repositioned for comfort, will continue to monitor.

## 2022-10-23 NOTE — PROGRESS NOTES
[facility] SPECIALTY [facility]  SPEECH THERAPY  No Visit Note    [] ICU    [x] Acute   Patient: Kiera Jackson  Room: 1225/0105-58      Kiera Jackson not seen on 10/23/2022 at 11:15 AM due to patient sleeping heavily medicated, not appropriate for tx/PO intake. Per nursing, patient refusing most PO intake at breakfast.  SLP also spoke with OMARI Roland from previous date who was patient 1:1 , OMARI Roland states patient drank full glass of thin liquids via straw with slight cough, tolerated purred solids without difficulty.        Signature: Irvin Combs M.S., CCC-SLP

## 2022-10-23 NOTE — PROGRESS NOTES
Patient in bed, resting with eyes closed. Noted patient to be restless. Alert to self only. Assessment and vitals completed and documented. Noted meeks catheter to be draining yellow hazy urine. Iv fluids infusing. FLACC 0. Side rails up. Bed in low position.

## 2022-10-23 NOTE — PROGRESS NOTES
Pt in bed, noted to be restless, alert to self, assessment and vitals complete as charted, respirations even and unlabored on room air, continues to have scattered bruising on arms and hands, meeks patent draining hazy yellow urine, oral care provided, will continue to monitor.

## 2022-10-23 NOTE — PROGRESS NOTES
Patient continues to be agitated and restless. Writer at bedside to redirect patient from pulling on IV fluids, catheter, and from getting out of bed. Patient only alert to self at this time and continues to deny pain. Vitals and assessment as charted. Bed alarm on, bed locked, writer at bedside sitting, call light within reach. Will continue to monitor this shift.

## 2022-10-23 NOTE — PROGRESS NOTES
Writer attempted to feed patient, patient took 3 bites of mashed potatoes and 60 ml of apple juice, refused the rest of meal, writer encouraged patient to eat more but patient continued to refuse. Patient repositioned, oral care provided, bath given, jose care given, 1:1 care continues.

## 2022-10-23 NOTE — PROGRESS NOTES
Progress Note    SUBJECTIVE:  FU related to denies any shortness of breath. Denies any pain. Definitely some confusion    OBJECTIVE:    Vitals:   TEMPERATURE:  Current - Temp: 96.9 °F (36.1 °C);  Max - Temp  Av °F (36.1 °C)  Min: 96.8 °F (36 °C)  Max: 97.2 °F (36.2 °C)  RESPIRATIONS RANGE: Resp  Av  Min: 18  Max: 20  PULSE RANGE: Pulse  Av  Min: 57  Max: 66  BLOOD PRESSURE RANGE:  Systolic (09SNT), OKR:086 , Min:104 , QAF:848   ; Diastolic (39BTL), AMU:97, Min:41, Max:71    PULSE OXIMETRY RANGE: SpO2  Av.5 %  Min: 95 %  Max: 98 %  24HR INTAKE/OUTPUT:    Intake/Output Summary (Last 24 hours) at 10/23/2022 0914  Last data filed at 10/23/2022 0440  Gross per 24 hour   Intake 2280.07 ml   Output 1225 ml   Net 1055.07 ml     -----------------------------------------------------------------  Exam:  General: Sleepy and alert  HEENT: Supple neck & negative  Heart: Regular  Lungs: clear to auscultation bilaterally & no retractions  Abdomen: Normal & soft, No tenderness and BS normal  Extremities:  No edema   Neuro: NonFocal     -----------------------------------------------------------------  Diagnostic Data:  Lab Results   Component Value Date    WBC 7.5 10/18/2022    HGB 12.4 10/18/2022    PLT See Reflexed IPF Result 10/18/2022       Lab Results   Component Value Date    BUN 7 (L) 10/22/2022    CREATININE 0.54 10/22/2022     10/22/2022    K 4.1 10/22/2022    CALCIUM 8.3 (L) 10/22/2022     10/22/2022    CO2 27 10/22/2022    LABGLOM >60 10/22/2022       Lab Results   Component Value Date    WBCUA 2 TO 5 10/11/2022    RBCUA 2 TO 5 10/11/2022    EPITHUA 0 TO 2 10/11/2022    LEUKOCYTESUR NEGATIVE 10/11/2022    SPECGRAV 1.020 10/11/2022    GLUCOSEU 3+ (A) 10/11/2022    KETUA NEGATIVE 10/11/2022    PROTEINU NEGATIVE 10/11/2022    HGBUR 1+ (A) 10/11/2022    BACTERIA 1+ (A) 10/11/2022       No results found for: MYOGLOBIN, TROPONINT, CKTOTAL, CKMB, PROBNP    CT Head W/O Contrast    Result Date: 10/11/2022  EXAMINATION: CT OF THE HEAD WITHOUT CONTRAST  10/11/2022 10:40 am TECHNIQUE: CT of the head was performed without the administration of intravenous contrast. Automated exposure control, iterative reconstruction, and/or weight based adjustment of the mA/kV was utilized to reduce the radiation dose to as low as reasonably achievable. COMPARISON: None. HISTORY: ORDERING SYSTEM PROVIDED HISTORY: altered mental status TECHNOLOGIST PROVIDED HISTORY: altered mental status Decision Support Exception - unselect if not a suspected or confirmed emergency medical condition->Emergency Medical Condition (MA) FINDINGS: BRAIN/VENTRICLES: There is no acute intracranial hemorrhage, mass effect or midline shift. No abnormal extra-axial fluid collection. The gray-white differentiation is maintained without evidence of an acute infarct. There is no evidence of hydrocephalus. Prominence of the ventricular system. Marked prominence of sulcation, greater on the left and over the frontal convexities and the left parietal lobe. Areas of decreased attenuation density in the deep white matter and periventricular regions compatible with old ischemic changes and or areas of demyelinization. ORBITS: The visualized portion of the orbits demonstrate no acute abnormality. SINUSES: The visualized paranasal sinuses and mastoid air cells demonstrate no acute abnormality. SOFT TISSUES/SKULL:  No acute abnormality of the visualized skull or soft tissues. No acute intracranial abnormality. Prominent senescent changes. These appear more prominent than usually seen in a patient of this age, but could be due to old injuries, various medications, or history of substance abuse. XR CHEST PORTABLE    Result Date: 10/11/2022  EXAMINATION: ONE XRAY VIEW OF THE CHEST 10/11/2022 10:30 am COMPARISON: None.  HISTORY: ORDERING SYSTEM PROVIDED HISTORY: altered mental status TECHNOLOGIST PROVIDED HISTORY: altered mental status FINDINGS: The heart is not enlarged. No pulmonary venous congestion or edema. No convincing lung consolidation or infiltrate. No pleural effusion or pneumothorax. Osseous structures grossly intact. No acute cardiopulmonary process       ASSESSMENT:    Principal Problem:    Hypernatremia  Active Problems:    Dehydration    Major depressive disorder with psychotic features (HCC)    Type 2 diabetes mellitus without complication (HCC)    Essential hypertension    ASHD (arteriosclerotic heart disease)    Gastroesophageal reflux disease without esophagitis    Bacteremia due to Staphylococcus aureus    MSSA (methicillin susceptible Staphylococcus aureus) septicemia (HCC)    Parotitis, acute    SHAINA (acute kidney injury) ([address]    Alcoholic cirrhosis of liver with ascites (HCC)    Major depressive disorder    ALEXX (obstructive sleep apnea)-witnessed    Hypokalemia  Resolved Problems:    * No resolved hospital problems. *      Patient Active Problem List    Diagnosis Date Noted    Hypokalemia 10/19/2022    ALEXX (obstructive sleep apnea)-witnessed 10/18/2022    MSSA (methicillin susceptible Staphylococcus aureus) septicemia ([address] 10/17/2022    Parotitis, acute 10/17/2022    SHAINA (acute kidney injury) ([address] 72/08/2535    Alcoholic cirrhosis of liver with ascites ([address] 10/17/2022    Major depressive disorder 10/17/2022    Bacteremia due to Staphylococcus aureus 10/13/2022    Hypernatremia 10/11/2022    Dehydration 10/11/2022    Major depressive disorder with psychotic features ([address] 10/11/2022    Type 2 diabetes mellitus without complication ([address] 91/50/7162    Essential hypertension 10/11/2022    ASHD (arteriosclerotic heart disease) 10/11/2022    Gastroesophageal reflux disease without esophagitis 10/11/2022    Macular pucker, left eye 04/22/2021       PLAN:  Electrolyte correction. Critical Care Time: 0  Antibiotics  Unchanged overnight.   Kera Reeves MD , MROSSANA.

## 2022-10-23 NOTE — PROGRESS NOTES
[facility]  Inpatient/Observation/Outpatient Rehabilitation    Date: 10/23/2022  Patient Name: Yunier Martin       [] Inpatient Acute/Observation       []  Outpatient  : 1955       [] Pt no showed for scheduled appointment    [] Pt refused/declined therapy at this time due to:           [x] Pt cancelled due to:  [] No Reason Given   [] Sick/ill   [] Other:    Pt not seen this date d/t hold per RN. Therapist/Assistant will attempt to see this patient, at our earliest opportunity.        ALLISON De Santiago Date: 10/23/2022

## 2022-10-23 NOTE — PROGRESS NOTES
Physical Therapy  [facility]  Inpatient/Observation/Outpatient Rehabilitation    Date: 10/23/2022  Patient Name: Lily Desai       [] Inpatient Acute/Observation       []  Outpatient  : 1955       [] Pt no showed for scheduled appointment    [] Pt refused/declined therapy at this time due to:           [x] Pt cancelled due to:  [] No Reason Given   [] Sick/ill   [] Other:  PT held per nurse  Therapist/Assistant will attempt to see this patient, at our earliest opportunity.        Anju Vazquez, NAN Date: 10/23/2022

## 2022-10-23 NOTE — PROGRESS NOTES
Attempted to give patient breakfast, patient took two bite of eggs, trying to spit food and mediation out, Eileen Salamanca was successful with giving medications in pudding, patient refused all other oral intake, patient continues to be restless and agitated at times, prn ativan given, oral care provided, attempted to give patient sips of water, patient spits out, patient is alert to self, unable to verbalize where she is, bed alarm on and in lowers position, 1:1 care continues, will continue to monitor.

## 2022-10-23 NOTE — PROGRESS NOTES
Sisters left room, patient resting in bed comfortably, restlessness noted at times, answers to name only, re-assessment complete with vitals see flow sheet for documentation. Arnold patent draining clear yellow urine, oral care provided, repositioned for comfort, will continue to monitor.

## 2022-10-23 NOTE — PLAN OF CARE
Problem: Discharge Planning  Goal: Discharge to home or other facility with appropriate resources  Outcome: Progressing  Flowsheets  Taken 10/23/2022 0700 by Kojo Shetty RN  Discharge to home or other facility with appropriate resources: Identify barriers to discharge with patient and caregiver  Taken 10/22/2022 2308 by Verna Hernandez RN  Discharge to home or other facility with appropriate resources: Identify barriers to discharge with patient and caregiver  Taken 10/22/2022 1821 by Verna Hernandez RN  Discharge to home or other facility with appropriate resources: Identify barriers to discharge with patient and caregiver     Problem: ABCDS Injury Assessment  Goal: Absence of physical injury  10/23/2022 0739 by Kojo Shetty RN  Outcome: Progressing  10/22/2022 1841 by Verna Hernandez RN  Outcome: Progressing  Note: Patient is encouraged to reposition and assessed for any injuries. Will continue to monitor this shift. Problem: Skin/Tissue Integrity  Goal: Absence of new skin breakdown  Description: 1. Monitor for areas of redness and/or skin breakdown  2. Assess vascular access sites hourly  3. Every 4-6 hours minimum:  Change oxygen saturation probe site  4. Every 4-6 hours:  If on nasal continuous positive airway pressure, respiratory therapy assess nares and determine need for appliance change or resting period. 10/23/2022 0739 by Kojo Shetty RN  Outcome: Progressing  10/22/2022 1841 by Verna Hernandez RN  Outcome: Progressing  Note: Patient able to reposition self at this time and use call light appropriately for any assistance. Will continue to monitor this shift      Problem: Safety - Adult  Goal: Free from fall injury  10/23/2022 0739 by Kojo Shetty RN  Outcome: Progressing  10/22/2022 1841 by Verna Hernandez RN  Outcome: Progressing  Note: Bed alarm on, bed locked, side rails up, gripper socks on, call light within reach, sitter at bedside. Will continue to monitor this shift. Problem: Nutrition Deficit:  Goal: Optimize nutritional status  10/23/2022 0739 by Roland Trinh RN  Outcome: Progressing  10/22/2022 1841 by Odilai Torres RN  Outcome: Progressing  Flowsheets (Taken 10/22/2022 1841)  Nutrient intake appropriate for improving, restoring, or maintaining nutritional needs: Assess nutritional status and recommend course of action  Note: Encouraging foods and liquids as tolerated. Will continue to monitor this shift. Problem: Pain  Goal: Verbalizes/displays adequate comfort level or baseline comfort level  10/23/2022 0739 by Roland Trinh RN  Outcome: Progressing  Flowsheets  Taken 10/23/2022 0700 by Roland Trinh RN  Verbalizes/displays adequate comfort level or baseline comfort level:   Assess pain using appropriate pain scale   Administer analgesics based on type and severity of pain and evaluate response  Taken 10/22/2022 2308 by Odilia Torres RN  Verbalizes/displays adequate comfort level or baseline comfort level: Encourage patient to monitor pain and request assistance  10/22/2022 1841 by Odilia Torres RN  Outcome: Progressing  Flowsheets  Taken 10/22/2022 1841  Verbalizes/displays adequate comfort level or baseline comfort level:   Encourage patient to monitor pain and request assistance   Assess pain using appropriate pain scale   Administer analgesics based on type and severity of pain and evaluate response   Implement non-pharmacological measures as appropriate and evaluate response  Taken 10/22/2022 1821  Verbalizes/displays adequate comfort level or baseline comfort level: Encourage patient to monitor pain and request assistance  Note: Patient denies any pain at this time. Will continue to monitor this shift.       Problem: Metabolic/Fluid and Electrolytes - Adult  Goal: Electrolytes maintained within normal limits  10/23/2022 0739 by Roland Trinh RN  Outcome: Progressing  Flowsheets  Taken 10/23/2022 0700 by Roland Trinh RN  Electrolytes maintained within normal limits:   Monitor labs and assess patient for signs and symptoms of electrolyte imbalances   Administer electrolyte replacement as ordered  Taken 10/22/2022 2308 by Jerome Peralta RN  Electrolytes maintained within normal limits: Monitor labs and assess patient for signs and symptoms of electrolyte imbalances  10/22/2022 1841 by Jerome Peralta RN  Outcome: Progressing  Flowsheets  Taken 10/22/2022 1841  Electrolytes maintained within normal limits: Monitor labs and assess patient for signs and symptoms of electrolyte imbalances  Taken 10/22/2022 1821  Electrolytes maintained within normal limits: Monitor labs and assess patient for signs and symptoms of electrolyte imbalances  Goal: Glucose maintained within prescribed range  10/23/2022 0739 by Karin Cho RN  Outcome: Progressing  Flowsheets  Taken 10/23/2022 0700 by Karin Cho RN  Glucose maintained within prescribed range:   Monitor blood glucose as ordered   Assess for signs and symptoms of hyperglycemia and hypoglycemia   Administer ordered medications to maintain glucose within target range  Taken 10/22/2022 2308 by Jerome Peralta RN  Glucose maintained within prescribed range: Monitor blood glucose as ordered  10/22/2022 1841 by Jerome Peralta RN  Outcome: Progressing  Flowsheets  Taken 10/22/2022 1841  Glucose maintained within prescribed range:   Monitor blood glucose as ordered   Assess for signs and symptoms of hyperglycemia and hypoglycemia  Taken 10/22/2022 1821  Glucose maintained within prescribed range: Monitor blood glucose as ordered

## 2022-10-23 NOTE — PLAN OF CARE
Problem: Discharge Planning  Goal: Discharge to home or other facility with appropriate resources  10/23/2022 1912 by Marylu Ferreira RN  Outcome: Progressing  Flowsheets  Taken 10/23/2022 1912 by Marylu Ferreira RN  Discharge to home or other facility with appropriate resources: Identify barriers to discharge with patient and caregiver  Taken 10/23/2022 1615 by Kassandra Arroyo RN  Discharge to home or other facility with appropriate resources: Identify barriers to discharge with patient and caregiver     Problem: ABCDS Injury Assessment  Goal: Absence of physical injury  10/23/2022 1912 by Marylu Ferreira RN  Outcome: Progressing  Flowsheets (Taken 10/23/2022 1912)  Absence of Physical Injury: Implement safety measures based on patient assessment  Note: Sitter at bedside. Bed in low position. Side rails up. Problem: Skin/Tissue Integrity  Goal: Absence of new skin breakdown  Description: 1. Monitor for areas of redness and/or skin breakdown  2. Assess vascular access sites hourly  3. Every 4-6 hours minimum:  Change oxygen saturation probe site  4. Every 4-6 hours:  If on nasal continuous positive airway pressure, respiratory therapy assess nares and determine need for appliance change or resting period. 10/23/2022 1912 by Marylu Ferreira RN  Outcome: Progressing  Note: No new skin issues noted. Patient turns self. Problem: Safety - Adult  Goal: Free from fall injury  10/23/2022 1912 by Marylu Ferreira RN  Outcome: Progressing  Flowsheets (Taken 10/23/2022 1912)  Free From Fall Injury: Instruct family/caregiver on patient safety  Note: Bed in low position. Side rails up.       Problem: Nutrition Deficit:  Goal: Optimize nutritional status  10/23/2022 1912 by Marylu Ferreira RN  Outcome: Progressing  Flowsheets (Taken 10/23/2022 1912)  Nutrient intake appropriate for improving, restoring, or maintaining nutritional needs: Monitor oral intake, labs, and treatment plans     Problem: Pain  Goal: Verbalizes/displays adequate comfort level or baseline comfort level  10/23/2022 1912 by Pushpa Coker RN  Outcome: Progressing  Flowsheets  Taken 10/23/2022 1912 by Pushpa Coker RN  Verbalizes/displays adequate comfort level or baseline comfort level:   Encourage patient to monitor pain and request assistance   Administer analgesics based on type and severity of pain and evaluate response   Consider cultural and social influences on pain and pain management   Implement non-pharmacological measures as appropriate and evaluate response   Assess pain using appropriate pain scale   Notify Licensed Independent Practitioner if interventions unsuccessful or patient reports new pain  Taken 10/23/2022 1615 by Kojo Shetty RN  Verbalizes/displays adequate comfort level or baseline comfort level:   Encourage patient to monitor pain and request assistance   Assess pain using appropriate pain scale   Administer analgesics based on type and severity of pain and evaluate response   Implement non-pharmacological measures as appropriate and evaluate response  Note: FLACC 0. Patient denies pain.      Problem: Metabolic/Fluid and Electrolytes - Adult  Goal: Electrolytes maintained within normal limits  10/23/2022 1912 by Pushpa Coker RN  Outcome: Progressing  Flowsheets  Taken 10/23/2022 1912 by Pushpa Coker RN  Electrolytes maintained within normal limits:   Monitor labs and assess patient for signs and symptoms of electrolyte imbalances   Administer electrolyte replacement as ordered  Taken 10/23/2022 1615 by Kojo Shetty RN  Electrolytes maintained within normal limits: Monitor labs and assess patient for signs and symptoms of electrolyte imbalances     Problem: Metabolic/Fluid and Electrolytes - Adult  Goal: Glucose maintained within prescribed range  10/23/2022 1912 by Pushpa Coker RN  Outcome: Progressing  Flowsheets  Taken 10/23/2022 1912 by Pushpa Coker RN  Glucose maintained within prescribed range:   Monitor blood glucose as ordered   Assess for signs and symptoms of hyperglycemia and hypoglycemia   Administer ordered medications to maintain glucose within target range   Assess barriers to adequate nutritional intake and initiate nutrition consult as needed  Taken 10/23/2022 1615 by Maxim Alcantar RN  Glucose maintained within prescribed range: Monitor blood glucose as ordered

## 2022-10-23 NOTE — PROGRESS NOTES
1:1 continues with patient, attempted to feed patient lunch, patient sleeping only took a couple of bites, oral care provided, nystatin liquid given to patient with mouth swab, will continue to monitor.

## 2022-10-23 NOTE — PROGRESS NOTES
Patient very restless and agitated and combative with redirection. Patient pulling at lines and attempting to get out of bed. Writer will administer PRN dose of ativan.

## 2022-10-24 LAB
ANION GAP SERPL CALCULATED.3IONS-SCNC: 8 MMOL/L (ref 9–17)
BUN BLDV-MCNC: 5 MG/DL (ref 8–23)
BUN/CREAT BLD: 9 (ref 9–20)
CALCIUM SERPL-MCNC: 8 MG/DL (ref 8.6–10.4)
CHLORIDE BLD-SCNC: 106 MMOL/L (ref 98–107)
CO2: 26 MMOL/L (ref 20–31)
CREAT SERPL-MCNC: 0.54 MG/DL (ref 0.5–0.9)
GFR SERPL CREATININE-BSD FRML MDRD: >60 ML/MIN/1.73M2
GLUCOSE BLD-MCNC: 111 MG/DL (ref 74–100)
GLUCOSE BLD-MCNC: 115 MG/DL (ref 74–100)
GLUCOSE BLD-MCNC: 117 MG/DL (ref 70–99)
GLUCOSE BLD-MCNC: 90 MG/DL (ref 74–100)
GLUCOSE BLD-MCNC: 92 MG/DL (ref 74–100)
POTASSIUM SERPL-SCNC: 3.7 MMOL/L (ref 3.7–5.3)
SODIUM BLD-SCNC: 140 MMOL/L (ref 135–144)

## 2022-10-24 PROCEDURE — 6360000002 HC RX W HCPCS: Performed by: NURSE PRACTITIONER

## 2022-10-24 PROCEDURE — 6370000000 HC RX 637 (ALT 250 FOR IP): Performed by: NURSE PRACTITIONER

## 2022-10-24 PROCEDURE — 6370000000 HC RX 637 (ALT 250 FOR IP): Performed by: FAMILY MEDICINE

## 2022-10-24 PROCEDURE — 2580000003 HC RX 258: Performed by: NURSE PRACTITIONER

## 2022-10-24 PROCEDURE — 1200000000 HC SEMI PRIVATE

## 2022-10-24 PROCEDURE — APPSS30 APP SPLIT SHARED TIME 16-30 MINUTES: Performed by: NURSE PRACTITIONER

## 2022-10-24 PROCEDURE — 97530 THERAPEUTIC ACTIVITIES: CPT

## 2022-10-24 PROCEDURE — 36415 COLL VENOUS BLD VENIPUNCTURE: CPT

## 2022-10-24 PROCEDURE — 94761 N-INVAS EAR/PLS OXIMETRY MLT: CPT

## 2022-10-24 PROCEDURE — 82947 ASSAY GLUCOSE BLOOD QUANT: CPT

## 2022-10-24 PROCEDURE — 92526 ORAL FUNCTION THERAPY: CPT

## 2022-10-24 PROCEDURE — 80048 BASIC METABOLIC PNL TOTAL CA: CPT

## 2022-10-24 PROCEDURE — 99232 SBSQ HOSP IP/OBS MODERATE 35: CPT | Performed by: INTERNAL MEDICINE

## 2022-10-24 RX ADMIN — CEFAZOLIN 2000 MG: 2 INJECTION, POWDER, FOR SOLUTION INTRAMUSCULAR; INTRAVENOUS at 13:47

## 2022-10-24 RX ADMIN — DIVALPROEX SODIUM 125 MG: 125 CAPSULE ORAL at 20:04

## 2022-10-24 RX ADMIN — FOLIC ACID 1 MG: 1 TABLET ORAL at 12:30

## 2022-10-24 RX ADMIN — METOPROLOL TARTRATE 50 MG: 50 TABLET, FILM COATED ORAL at 20:04

## 2022-10-24 RX ADMIN — DOCUSATE SODIUM 100 MG: 100 CAPSULE, LIQUID FILLED ORAL at 20:04

## 2022-10-24 RX ADMIN — HALOPERIDOL 5 MG: 5 TABLET ORAL at 20:04

## 2022-10-24 RX ADMIN — CHLORHEXIDINE GLUCONATE 0.12% ORAL RINSE 15 ML: 1.2 LIQUID ORAL at 20:04

## 2022-10-24 RX ADMIN — NYSTATIN 500000 UNITS: 100000 SUSPENSION ORAL at 12:30

## 2022-10-24 RX ADMIN — RISPERIDONE 1 MG: 1 TABLET, FILM COATED ORAL at 12:30

## 2022-10-24 RX ADMIN — CHLORHEXIDINE GLUCONATE 0.12% ORAL RINSE 15 ML: 1.2 LIQUID ORAL at 12:30

## 2022-10-24 RX ADMIN — METOPROLOL TARTRATE 50 MG: 50 TABLET, FILM COATED ORAL at 12:30

## 2022-10-24 RX ADMIN — BENZTROPINE MESYLATE 1 MG: 1 TABLET ORAL at 20:04

## 2022-10-24 RX ADMIN — CEFAZOLIN 2000 MG: 2 INJECTION, POWDER, FOR SOLUTION INTRAMUSCULAR; INTRAVENOUS at 20:17

## 2022-10-24 RX ADMIN — SENNOSIDES 8.6 MG: 8.6 TABLET, FILM COATED ORAL at 20:04

## 2022-10-24 RX ADMIN — RISPERIDONE 1 MG: 1 TABLET, FILM COATED ORAL at 20:04

## 2022-10-24 RX ADMIN — THIAMINE HCL TAB 100 MG 100 MG: 100 TAB at 12:30

## 2022-10-24 RX ADMIN — PRAVASTATIN SODIUM 40 MG: 20 TABLET ORAL at 12:30

## 2022-10-24 RX ADMIN — DOCUSATE SODIUM 100 MG: 100 CAPSULE, LIQUID FILLED ORAL at 12:30

## 2022-10-24 RX ADMIN — POTASSIUM CHLORIDE 10 MEQ: 10 TABLET, EXTENDED RELEASE ORAL at 12:30

## 2022-10-24 RX ADMIN — HALOPERIDOL 5 MG: 5 TABLET ORAL at 12:30

## 2022-10-24 RX ADMIN — LORAZEPAM 0.5 MG: 0.5 TABLET ORAL at 19:08

## 2022-10-24 RX ADMIN — PANTOPRAZOLE SODIUM 40 MG: 40 TABLET, DELAYED RELEASE ORAL at 06:30

## 2022-10-24 RX ADMIN — CEFAZOLIN 2000 MG: 2 INJECTION, POWDER, FOR SOLUTION INTRAMUSCULAR; INTRAVENOUS at 04:01

## 2022-10-24 RX ADMIN — NYSTATIN 500000 UNITS: 100000 SUSPENSION ORAL at 20:07

## 2022-10-24 RX ADMIN — LORAZEPAM 0.5 MG: 0.5 TABLET ORAL at 01:59

## 2022-10-24 RX ADMIN — BENZTROPINE MESYLATE 1 MG: 1 TABLET ORAL at 12:30

## 2022-10-24 RX ADMIN — ASPIRIN 81 MG: 81 TABLET, COATED ORAL at 12:30

## 2022-10-24 RX ADMIN — SODIUM CHLORIDE: 9 INJECTION, SOLUTION INTRAVENOUS at 02:30

## 2022-10-24 RX ADMIN — DIVALPROEX SODIUM 125 MG: 125 CAPSULE ORAL at 12:30

## 2022-10-24 ASSESSMENT — PAIN SCALES - WONG BAKER: WONGBAKER_NUMERICALRESPONSE: 0

## 2022-10-24 ASSESSMENT — PAIN SCALES - GENERAL: PAINLEVEL_OUTOF10: 0

## 2022-10-24 NOTE — PROGRESS NOTES
Patient in bed, resting with eyes closed. Patient noted to be restless. Reassessment completed at this time. Vitals taken and documented. Patient denies pain or complaints. Call light and bed side table within reach. Side rails up times two.

## 2022-10-24 NOTE — PROGRESS NOTES
[facility]  Inpatient/Observation/Outpatient Rehabilitation    Date: 10/24/2022  Patient Name: Harsh Denise       [x] Inpatient Acute/Observation       []  Outpatient  : 1955       [] Pt no showed for scheduled appointment    [] Pt refused/declined therapy at this time due to:           [] Pt cancelled due to:  [] No Reason Given   [] Sick/ill   [x] Other:    Pt not seen this date d/t pt difficult to awaken x 2 attempts. Therapist/Assistant will attempt to see this patient, at our earliest opportunity.        TAVO De Santiago/SWETHA Date: 10/24/2022

## 2022-10-24 NOTE — PROGRESS NOTES
[facility]  SPEECH THERAPY  No Visit Note    [] ICU    [x] Acute   Patient: Mitzy Orozco  Room: 74/7295-70      Mitzy Orozco not seen on 10/24/2022 at 9:20 AM due to patient not being alert enough for PO intake or dysphagia tx. Patient sitting upright in chair and briefly opens her eyes, but then closes them and goes back to resting. ST will re-attempt when appropriate.           Signature: Kathie Marie

## 2022-10-24 NOTE — PROGRESS NOTES
Crushed medication given with no issues. Patient drank 240 cc of nectar thick water. Denies needs or complaints.

## 2022-10-24 NOTE — PROGRESS NOTES
[facility] - Progress Note  - Telemedicine  Today's Date and Time: 10/24/2022, 10:17 AM    Impression :   MSSA septicemia 10-11-22 x 2  Lt parotitis from dehydration and MSSA  Dehydration  Hypernatremia-resolved  Altered mental status  SHAINA- Resolved  Cirrhosis of the liver  Major depression with psychotic features  Oral candidiasis    Patient evaluated by Telemedicine. Requesting Institution: [facility]  Provider Institution: [address] [place] [address],[address] at [facility] [place]: Ms Hopkins Pro, APRN- IM    Recommendations:     Continue cefazolin 2 gm IV q 8 hr. Stop date 11-13-22  Alternatively she could receive Ceftriaxone 2 gm IV q 24 hr until 11-13-22, if the facility where she is going can not accommodate IV infusions 3 times daily. Repeat blood cultures x 2 on 11-18-22 to document clearing of septicemia  Oral nystatin  Medical Decision Making/Summary/Discussion:10/24/2022       Infection Control Recommendations   [place] Precautions    Antimicrobial Stewardship Recommendations     Simplification of therapy  Targeted therapy    Coordination of Outpatient Care:   Estimated Length of IV antimicrobials:11-13-22  Patient will need Midline Catheter Insertion: yes  Patient will need PICC line Insertion:no  Patient will need: Home IV , [place],  SNF,  LTAC:  Yes  Patient will need outpatient wound care:No    Chief complaint/reason for consultation:   MSSA septicemia      History of Present Illness:   Tremayne Watson is a 74y.o.-year-old  female who was initially admitted on 10/11/2022. Patient seen at the request of     INITIAL HISTORY:     Patient was transferred to [facility] fro [place] on 10-11-22 because of altered mental status. She was found to be severely hypernatremic with a Na of 161, dehydrated and in SHAINA. She has received hydration with improvement. Has been evaluated by Nephrology.      She has a past Hx of ETOH intake, cirrhosis of the liver with ascites, CAD, essential HTN, DM 2, GERD, bladder incontinence. ID service asked to evaluate because of S aureus septicemia on 10-13-22. CURRENT EVALUATION :10/24/2022  /70   Pulse 53   Temp 96.8 °F (36 °C) (Temporal)   Resp 16   Ht 5' 6\" (1.676 m)   Wt 156 lb 11.2 oz (71.1 kg)   SpO2 98%   BMI 25.29 kg/m²     Afebrile  VS stable    Patient stable  No complaints  No new issues per RN    Patient has underlying confusion and intermittent agitation    There is no growth on repeat blood cultures from 10/18/22  Midline placed for outpatient antibiotics    Discharge planning underway back to SNF  Difficulty finding a SNF that will accept the patient. Labs, X rays reviewed: 10/24/2022    BUN: 12-->8-->6-->4-->7-->5  Cr:0.58-->0.73-->0.79-->0.54-->0.54  Na 145-->147-->141-->140  K  4.6-->3.8-->4.1-->3.7    WBC: 8.3-->7.5  Hb:12.4  Plat: 80-->97    Cultures:  Urine:    Blood:  10-11-22: MSSA x 2  10-18-22: No growth   Sputum :    Wound:      Discussed with OMARI HICKMAN. I have personally reviewed the past medical history, past surgical history, medications, social history, and family history, and I have updated the database accordingly. Past Medical History:     Past Medical History:   Diagnosis Date    Alcohol dependence in remission [facility])     Alcoholic cirrhosis of liver without ascites (HCC)     ASHD (arteriosclerotic heart disease)     Bacteremia due to Staphylococcus aureus 10/13/2022    Depression     Diabetes mellitus (HCC)     GERD (gastroesophageal reflux disease)     Hepatitis C     WITH FULL RECOVERY - INFECTIOUS DISEASE DR. Cullen Cyr - LAST VISIT 7/2020    Hyperlipidemia     Hypertension     Irregular heartbeat     Murmur, cardiac     noted by PCP, no echo per patient    Urinary bladder incontinence     Wears eyeglasses     Well adult health check     PCP - DR. Radha Rea - 3/2021    Well adult health check     INFECTIOUS DISEASE - DR. Jennifer Zabala    [facility] adult health check     GI - DR. Deepa LEONARD       Past Surgical  History:     Past Surgical History:   Procedure Laterality Date    BREAST SURGERY Right     REMOVAL BENIGN TUMOR    COLONOSCOPY      HIP SURGERY Right 1964    RECONSTRUCTION    TONSILLECTOMY      AS A CHILD    TUBAL LIGATION  1975    VITRECTOMY Left 2021    VITRECTOMY Left 2021    VITRECTOMY 25 GAUGE, MEMBRANE PEEL, ICG performed by Kain Evans MD at [facility] OR       Medications:      haloperidol  5 mg Oral TID    risperiDONE  1 mg Oral BID    lidocaine 1 % injection  5 mL IntraDERmal Once    sodium chloride flush  5-40 mL IntraVENous 2 times per day    docusate sodium  100 mg Oral BID    senna  1 tablet Oral Nightly    chlorhexidine  15 mL Mouth/Throat BID    nystatin  5 mL Oral 4x Daily    ceFAZolin  2,000 mg IntraVENous Q8H    insulin lispro  0-8 Units SubCUTAneous TID WC    insulin lispro  0-4 Units SubCUTAneous Nightly    aspirin  81 mg Oral Daily    benztropine  1 mg Oral BID    divalproex  125 mg Oral TID    folic acid  1 mg Oral Daily    metoprolol tartrate  50 mg Oral BID    pantoprazole  40 mg Oral QAM AC    potassium chloride  10 mEq Oral Daily    pravastatin  40 mg Oral Daily    vitamin B-1  100 mg Oral Daily    VORTIoxetine HBr  20 mg Oral Daily    sodium chloride flush  5-40 mL IntraVENous 2 times per day       Social History:     Social History     Socioeconomic History    Marital status: Single     Spouse name: Not on file    Number of children: Not on file    Years of education: Not on file    Highest education level: Not on file   Occupational History    Not on file   Tobacco Use    Smoking status: Former     Types: Cigarettes     Quit date:      Years since quittin.8    Smokeless tobacco: Never   Vaping Use    Vaping Use: Never used   Substance and Sexual Activity    Alcohol use: Not on file    Drug use: Not on file    Sexual activity: Not on file   Other Topics Concern    Not on file   Social History Narrative    Not on file     Social Determinants of Health     Financial Resource Strain: Not on file   Food Insecurity: Not on file   Transportation Needs: Not on file   Physical Activity: Not on file   Stress: Not on file   Social Connections: Not on file   Intimate Partner Violence: Not on file   Housing Stability: Not on file       Family History:   History reviewed. No pertinent family history. Allergies:   Lisinopril     Review of Systems:     Patient unable to provide ROS. Confused and agitated. 10/24/2022        Constitutional: No fevers or chills. No systemic complaints  Head: No headaches  Eyes: No double vision or blurry vision. No conjunctival inflammation. ENT: No sore throat or runny nose. . No hearing loss, tinnitus or vertigo. Cardiovascular: No chest pain or palpitations. No shortness of breath. No TESFAYE  Lung: No shortness of breath or cough. No sputum production  Abdomen: No nausea, vomiting, diarrhea, or abdominal pain. Author Showman No cramps. Genitourinary: No increased urinary frequency, or dysuria. No hematuria. No suprapubic or CVA pain  Musculoskeletal: No muscle aches or pains. No joint effusions, swelling or deformities  Hematologic: No bleeding or bruising. Neurologic: No headache, weakness, numbness, or tingling. Integument: No rash, no ulcers. Psychiatric: No depression. Endocrine: No polyuria, no polydipsia, no polyphagia. Physical Examination :   Patient Vitals for the past 8 hrs:   BP Temp Temp src Pulse Resp SpO2 Weight   10/24/22 0645 125/70 96.8 °F (36 °C) Temporal 53 16 98 % --   10/24/22 0315 -- -- -- -- -- -- 156 lb 11.2 oz (71.1 kg)     General Appearance: Awake, mentation altered and in no apparent distress  Head:  Normocephalic, no trauma  Eyes: Pupils equal, round, reactive to light and accommodation; extraocular movements intact; sclera anicteric; conjunctivae pink. No embolic phenomena.   ENT: Oropharynx clear, without erythema, exudate, or thrush. No tenderness of sinuses. Mouth/throat: mucosa pink and moist. No lesions. Lt side parotitis   Neck:Supple, without lymphadenopathy. Thyroid normal, No bruits. Pulmonary/Chest: Clear to auscultation, without wheezes, rales, or rhonchi. No dullness to percussion. Cardiovascular: Regular rate and rhythm without murmurs, rubs, or gallops. Abdomen: Soft, non tender. Bowel sounds normal. No organomegaly. Ascites. All four Extremities: No cyanosis, clubbing, edema, or effusions. Neurologic: No gross sensory or motor deficits. Confused and agitated . Has tremors  Skin: Warm and dry with good turgor. No signs of peripheral arterial or venous insufficiency. No ulcerations. No open wounds. Medical Decision Making -Laboratory:   I have independently reviewed/ordered the following labs:    CBC with Differential:   No results for input(s): WBC, HGB, HCT, PLT, SEGSPCT, BANDSPCT, LYMPHOPCT, MONOPCT, EOSPCT in the last 72 hours. BMP:   Recent Labs     10/22/22  0737 10/24/22  0520    140   K 4.1 3.7    106   CO2 27 26   BUN 7* 5*   CREATININE 0.54 0.54     Hepatic Function Panel:   No results for input(s): PROT, LABALBU, BILIDIR, IBILI, BILITOT, ALKPHOS, ALT, AST in the last 72 hours. No results for input(s): RPR in the last 72 hours. No results for input(s): HIV in the last 72 hours. No results for input(s): BC in the last 72 hours. Lab Results   Component Value Date/Time    MUCUS TRACE 10/11/2022 07:00 PM    RBC 4.23 10/18/2022 06:20 AM    WBC 7.5 10/18/2022 06:20 AM    TURBIDITY Clear 10/11/2022 07:00 PM     Lab Results   Component Value Date/Time    CREATININE 0.54 10/24/2022 05:20 AM    GLUCOSE 117 10/24/2022 05:20 AM       Medical Decision Making-Imaging:     EXAMINATION:   ONE XRAY VIEW OF THE CHEST       10/11/2022 10:30 am       COMPARISON:   None.        HISTORY:   ORDERING SYSTEM PROVIDED HISTORY: altered mental status   TECHNOLOGIST PROVIDED HISTORY:   altered mental status       FINDINGS:   The heart is not enlarged. No pulmonary venous congestion or edema. No   convincing lung consolidation or infiltrate. No pleural effusion or   pneumothorax. Osseous structures grossly intact. Impression   No acute cardiopulmonary process     Medical Decision [id number]:       Medical Decision Making-Other:     Note:  Labs, medications, radiologic studies were reviewed with personal review of films  Large amounts of data were reviewed  Discussed with nursing Staff, Discharge planner  Infection Control and Prevention measures reviewed  All prior entries were reviewed  Administer medications as ordered  Prognosis: Guarded  Discharge planning reviewed  Follow up as outpatient. Thank you for allowing us to participate in the care of this patient. Please call with questions. Marsha Ryder, APRN - CNP    ATTESTATION:    I have discussed the case, including pertinent history and exam findings with the APRN. I have evaluated the  History, physical findings and pictures of the patient and the key elements of the encounter have been performed by me. I have reviewed the laboratory data, other diagnostic studies and discussed them with the APRN. I have updated the medical record where necessary. I agree with the assessment, plan and orders as documented by the APRN.     Carrol Barraza MD.      Pager: [phone] - Office: [phone]

## 2022-10-24 NOTE — PROGRESS NOTES
[facility]  Inpatient/Observation/Outpatient Rehabilitation    Date: 10/24/2022  Patient Name: Kamille Cortez       [] Inpatient Acute/Observation       []  Outpatient  : 1955       [] Pt no showed for scheduled appointment    [x] Pt refused/declined therapy at this time due to: Unable to see pt. At this time. Attempted to wake pt. And would not arouse, RN aware and also attempted and unable. [] Pt cancelled due to:  [] No Reason Given   [] Sick/ill   [] Other:    Therapist/Assistant will attempt to see this patient, at our earliest opportunity.        Lois Herman, PTA Date: 10/24/2022

## 2022-10-24 NOTE — PROGRESS NOTES
Patient continues to be resting in bed with eyes closed at this time. Noted to be restless. Denies needs or concerns at this time.

## 2022-10-24 NOTE — PROGRESS NOTES
Physical Therapy  Facility/Department: [facility] AT THE [facility] MED SURG  Daily Treatment Note  NAME: Mirela Hills  : 1955  MRN: [id number]  Date of Service: 10/24/2022  Discharge Recommendations:  [address]   Patient Diagnosis(es): The primary encounter diagnosis was Hypernatremia. Diagnoses of Dehydration and Altered mental status, unspecified altered mental status type were also pertinent to this visit. Assessment   Assessment: PT. hard to wake with therapy and keep eyes open, not safe for ambulation at this time. Able to perform bed mobility and SPT Mod A x2. Activity Tolerance: Patient limited by fatigue;Treatment limited secondary to decreased cognition   Plan    Physcial Therapy Plan  General Plan: 2 times a day 7 days a week  Current Treatment Recommendations: Strengthening;ROM;Balance training;Functional mobility training;Transfer training;ADL/Self-care training;Neuromuscular re-education;Gait training; Safety education & training;Patient/Caregiver education & training   Restrictions  Restrictions/Precautions  Restrictions/Precautions: General Precautions, Fall Risk  Required Braces or Orthoses?: No   Subjective    Subjective  Subjective: Pt. in bed upon arrival with sitter present. RN agreeable to allow pt up to chair at this this time. Pain: denied  Orientation  Overall Orientation Status: Impaired   Objective   Bed Mobility Training  Bed Mobility Training: Yes  Overall Level of Assistance: Moderate assistance;Assist X2  Interventions: Verbal cues;Manual cues  Rolling: Moderate assistance;Assist X2  Supine to Sit: Moderate assistance;Assist X2  Scooting: Moderate assistance;Assist X2  Transfer Training  Transfer Training: Yes  Overall Level of Assistance: Moderate assistance;Assist X2  Interventions: Verbal cues;Manual cues; Safety awareness training  Sit to Stand: Moderate assistance;Assist X2  Stand to Sit: Moderate assistance;Assist X2  Stand Pivot Transfers:  Moderate assistance;Assist X2  Bed to Chair: Moderate assistance;Assist X2  Gait Training  Gait Training: No  Safety Devices  Type of Devices: Call light within reach; Sitter present; Left in bed; Chair alarm in place   Goals  Short Term Goals  Time Frame for Short Term Goals: 3 DAYS  Short Term Goal 1: MOD ASSIST BED MOBILITY  Short Term Goal 2: MIN ASSIST TRANSFERS. Long Term Goals  Time Frame for Long Term Goals : 4 WEEKS  Long Term Goal 1: MIN ASSIST TRANSFERS AND MIN ASSIST BED MOBILITY. Patient Goals   Patient Goals : UNABLE TO ASCERTAIN DUE TO IMPAIRED COGNITION AND LETHARGY. Education  Patient Education  Education Given To: Patient  Education Provided: Transfer Training; Fall Prevention Strategies  Education Method: Verbal;Other (Comment)  Barriers to Learning: Cognition  Education Outcome: Continued education needed  Therapy Time   Individual Concurrent Group Co-treatment   Time In 0815         Time Out 5661         Minutes 79 Toro Clarke PTA

## 2022-10-24 NOTE — PROGRESS NOTES
Progress Note    SUBJECTIVE:    Patient seen for f/u of Hypernatremia. She resting in bed eyes closed  easy resp. No distress. ROS: Unable due to altered mental status     All other systems were reviewed with the patient and are negative unless otherwise stated in HPI      OBJECTIVE:      Vitals:   Vitals:    10/24/22 0645   BP: 125/70   Pulse: 53   Resp: 16   Temp: 96.8 °F (36 °C)   SpO2: 98%     Weight: 156 lb 11.2 oz (71.1 kg)   Height: 5' 6\" (167.6 cm)     Weight  Wt Readings from Last 3 Encounters:   10/24/22 156 lb 11.2 oz (71.1 kg)   04/22/21 180 lb (81.6 kg)     Body mass index is 25.29 kg/m². 24HR INTAKE/OUTPUT:      Intake/Output Summary (Last 24 hours) at 10/24/2022 0811  Last data filed at 10/24/2022 0527  Gross per 24 hour   Intake 2347.48 ml   Output 1375 ml   Net 972.48 ml     -----------------------------------------------------------------  Exam:    GEN:   eyes closed awakens easily   EYES:   EOMI, pupils equal   NECK: Supple. No lymphadenopathy. No carotid bruit  FACE/MOUTH:  no further palpable left parotitis improving oral thrush resolving but mucous membranes less dry  CVS:     regular rate and rhythm, no audible murmur  PULM:  CTA, no wheezes, rales or rhonchi, no acute respiratory distress  ABD:     Bowels sounds normal.  Abdomen is soft. No distention. no tenderness to palpation. EXT:     no edema bilaterally . No calf tenderness. NEURO: Moves all extremities. Motor and sensory are grossly intact. Visible tremors   SKIN:    No rashes. No skin lesions    -----------------------------------------------------------------    Diagnostic Data:      Complete Blood Count:   No results for input(s): WBC, RBC, HGB, HCT, MCV, MCH, MCHC, RDW, PLT, MPV in the last 72 hours.        Last 3 Blood Glucose:   Recent Labs     10/22/22  0737 10/24/22  0520   GLUCOSE 152* 117*        Comprehensive Metabolic Profile:   Recent Labs     10/22/22  0737 10/24/22  0520    140   K 4.1 3.7   CL 106 106   CO2 27 26   BUN 7* 5*   CREATININE 0.54 0.54   GLUCOSE 152* 117*   CALCIUM 8.3* 8.0*        Urinalysis:   Lab Results   Component Value Date/Time    NITRU NEGATIVE 10/11/2022 07:00 PM    COLORU Yellow 10/11/2022 07:00 PM    PHUR 6.0 10/11/2022 07:00 PM    WBCUA 2 TO 5 10/11/2022 07:00 PM    RBCUA 2 TO 5 10/11/2022 07:00 PM    MUCUS TRACE 10/11/2022 07:00 PM    BACTERIA 1+ 10/11/2022 07:00 PM    SPECGRAV 1.020 10/11/2022 07:00 PM    LEUKOCYTESUR NEGATIVE 10/11/2022 07:00 PM    UROBILINOGEN ELEVATED 10/11/2022 07:00 PM    BILIRUBINUR NEGATIVE 10/11/2022 07:00 PM    GLUCOSEU 3+ 10/11/2022 07:00 PM    KETUA NEGATIVE 10/11/2022 07:00 PM       HgBA1c:    Lab Results   Component Value Date/Time    LABA1C 6.8 10/11/2022 09:35 AM       Radiology/Imaging:  XR CHEST (2 VW)   Final Result   No acute process. XR CHEST PORTABLE   Final Result   No acute cardiopulmonary process         CT Head W/O Contrast   Final Result   No acute intracranial abnormality. Prominent senescent changes. These appear more prominent than usually seen   in a patient of this age, but could be due to old injuries, various   medications, or history of substance abuse.                ASSESSMENT / PLAN:  Hypernatremia  Resolved  Appreciate Nephrology  IVF  Stop normal saline  Trend labs  Dehydration  IVF  Trend labs-improving  Possibly due to oversedation, decreased oral intake, psychotropic medications including other medications such as lactulose and Jardiance  Type 2 diabetes  POCT before meals and at bedtime  Insulin sliding scale  Hypoglycemia protocol  Hold Jardiance  Major depressive disorder with psychotic features  Stop Geodon   Continue Cogentin, Depakote, Trintellix,   Continue Risperdal to 1 mg bid  Continue oral Haldol to 5 mg tid  Continue Ativan 0.5 mg every 6 hours as needed  Hold Abilify  Bacteremia  Appreciate ID for ATB for DC   Stop Levaquin  Continue  Ancef through 11/13/2022  BC - Staph Aureus   Repeat BC - resolving  Thrush  Completed IV Diflucan  Continue Peridox  Constipation  Resolved  Continue Colace, Senokot  Witnessed ALEXX  Decreased responsiveness  Decreased doses of Haldol, Ativan and Risperodol  Apneic  Trend VBG  Change to CPAP at HS  Chest Xray negative  Essential hypertension  Continue Lopressor  Nutrition status:   at risk for malnutrition  Dietician consult initiated  Hospital Prophylaxis:   DVT: Lovenox   Stress Ulcer: H2 Blocker   High risk medications: none   Disposition:    Discharge plan is - Return to [facility] when medically cleared  Continuing to look at other options  IV ATB through 11/13 Ancef or Rocephin will be fine      Bijal Polo, APRN - CNP , APRN, NP-C  Hospitalist Medicine        10/24/2022, 8:11 AM

## 2022-10-24 NOTE — PROGRESS NOTES
Faxed updates to [address] for review. They are still reviewing and deciding if they will accept the patient. [facility] is still looking if they can accept the patient and do a one time contract with the insurance company.     JUSTEN Macias

## 2022-10-24 NOTE — PROGRESS NOTES
Pt vitals and assessment completed at this time, see flowsheet. Pt alert to self only, breathing normal. Pt denies any further needs, pt lying in bed, eyes closed, alarms on.

## 2022-10-24 NOTE — PROGRESS NOTES
[facility]    Facility/Department: Psychiatric hospital AT THE [facility] MED SURG    Speech Language Pathology    Clinical Bedside Swallow Evaluation    NAME:Bridget Serna    : 1955 (74 y.o.)    MRN: [id number]    ROOM: 0329/0329-01    ADMISSION DATE: 10/11/2022    PATIENT DIAGNOSIS(ES): Dehydration [E86.0]  Hypernatremia [E87.0]  Altered mental status, unspecified altered mental status type [R41.82]    Chief Complaint   Patient presents with    Altered Mental Status     Na+ 161 per [facility]       Patient Active Problem List    Diagnosis Date Noted    Hypokalemia 10/19/2022    ALEXX (obstructive sleep apnea)-witnessed 10/18/2022    MSSA (methicillin susceptible Staphylococcus aureus) septicemia ([address] 10/17/2022    Parotitis, acute 10/17/2022    SHAINA (acute kidney injury) ([address] 21/15/9851    Alcoholic cirrhosis of liver with ascites ([address] 10/17/2022    Major depressive disorder 10/17/2022    Bacteremia due to Staphylococcus aureus 10/13/2022    Hypernatremia 10/11/2022    Dehydration 10/11/2022    Major depressive disorder with psychotic features ([address] 10/11/2022    Type 2 diabetes mellitus without complication ([address]     Essential hypertension 10/11/2022    ASHD (arteriosclerotic heart disease) 10/11/2022    Gastroesophageal reflux disease without esophagitis 10/11/2022    Macular pucker, left eye 2021       Past Medical History:   Diagnosis Date    Alcohol dependence in remission ([address]     Alcoholic cirrhosis of liver without ascites ([address]     ASHD (arteriosclerotic heart disease)     Bacteremia due to Staphylococcus aureus 10/13/2022    Depression     Diabetes mellitus (HCC)     GERD (gastroesophageal reflux disease)     Hepatitis C     WITH FULL RECOVERY - INFECTIOUS DISEASE DR. Marilu Hawk - LAST VISIT 2020    Hyperlipidemia     Hypertension     Irregular heartbeat     Murmur, cardiac     noted by PCP, no echo per patient    Urinary bladder incontinence     Wears eyeglasses     Well adult health check     PCP - DR. Jeaneth Contreras - 3/2021    Well adult health check     INFECTIOUS DISEASE - DR. Lonnie Valdez    Well adult health check     GI - DR. Veronika Pablo       Past Surgical History:   Procedure Laterality Date    BREAST SURGERY Right 1975    REMOVAL BENIGN TUMOR    COLONOSCOPY      HIP SURGERY Right 1964    RECONSTRUCTION    TONSILLECTOMY      AS A CHILD    TUBAL LIGATION  1975    VITRECTOMY Left 04/22/2021    VITRECTOMY Left 4/22/2021    VITRECTOMY 25 GAUGE, MEMBRANE PEEL, ICG performed by Elías Pickett MD at [address] [place] [address] Ne   Allergen Reactions    Lisinopril Other (See Comments)     COUGH       DATE ONSET: 10/11/22    Date of Evaluation: 10/24/2022    Evaluating Therapist: JEZ Garza    Dysphagia Diagnosis    Dysphagia Diagnosis: Mild oral stage dysphagia; Suspected needs further assessment    Recommended Diet    Diet Solids Recommendation: Pureed    Liquid Consistency Recommendation: Mildly Thick (Nectar)    Recommended Form of Meds: Meds in puree    Compensatory Swallowing Strategies : Eat/Feed slowly;Upright as possible for all oral intake;Remain upright for 30-45 minutes after meals;Assist feed; Alternate solids and liquids;Small bites/sips; Other (comments)    Reason for Referral    Rani Fenton was referred for a bedside swallow evaluation to assess the efficiency of her swallow function, identify signs and symptoms of aspiration, identify risk factors, and make recommendations regarding safe dietary consistencies, effective compensatory strategies, and safe eating environment. Prior Dysphagia History  Prior Dysphagia History: Unknown dysphagia history. Patient is currently on a puree and mildly-thick liquids. Patient Complaint       General    Chart Reviewed: Yes  Behavior/Cognition: Confused; Lethargic;Uncooperative  Respiratory Status: Room air  O2 Device: None (Room air)  Communication Observation:  (Minimally verbal due to lethargy)  Follows Directions: Simple  Dentition: Poor dental/oral hygeine  Patient Positioning: Upright in chair  Baseline Vocal Quality: Normal  Prior Dysphagia History: Unknown dysphagia history. Patient is currently on a puree and mildly-thick liquids. Consistencies Administered: Thin - teaspoon    Vision and Hearing    Vision  Vision: Within Functional Limits  Hearing  Hearing: Within functional limits    Current Diet level    Current Diet : Pureed    Oral Motor    Labial: Decreased rate;Decreased seal  Dentition: Full  Oral Hygiene: Xerostomia;Dried secretions  Lingual: Decreased strength; Incoordinated  Mandible: Restricted    Oral/Pharyngeal Phase    Oral Phase - Comment: Patient continues to present with mild oral phase dysphagia. Patient demonstrated poor labial seal despite maximal verbal, visual, and tactile cueing. Pharyngeal Phase: Patient demonstrated delayed swallow initiation and cough x 1/5 trials of thin water via teaspoon. PO Trials  Assessment Method(s): Observation  Vocal Quality: No Impairment  Consistency Presented: Thin  How Presented: Self-fed/presented  Bolus Acceptance: No impairment  Bolus Formation/Control: Impaired  Type of Impairment: Lip closure;Spillage  Propulsion: Discoordination  Oral Residue: None  Initiation of Swallow: Delayed (# of seconds) (2-3)  Effective Modifications: None                        Dysphagia Diagnosis    Dysphagia Diagnosis: Mild oral stage dysphagia; Suspected needs further assessment    Dysphagia Outcome Severity Scale: Level 3: Moderate dysphagia- Total assisstance, supervision or strategies. Two or more diet consistencies restricted    Recommendations    Requires SLP Intervention: Yes  Diet Solids Recommendation: Pureed  Liquid Consistency Recommendation: Mildly Thick (Nectar)  Compensatory Swallowing Strategies : Eat/Feed slowly;Upright as possible for all oral intake;Remain upright for 30-45 minutes after meals;Assist feed; Alternate solids and liquids;Small bites/sips; Other (comments)  Recommended Form of Meds: Meds in puree  Therapeutic Interventions: Diet tolerance monitoring; Bolus control exercises;Oral care; Patient/Family education  Frequency of Treatment: Daily during inpatient stay    Prognosis    Prognosis: Fair    Education    Individuals consulted  Consulted and agree with results and recommendations: Patient;RN  RN Name: Milady Orozco     Treatment/Goals    Short-term Goals  Timeframe for Short-term Goals: 4 days  Goal 1: Patient will trial thin liquids without overt s/sx of aspiration/penetration in 80% of opportunities. Goal 2: Patient will trial minced and moist solids with adequate bolus prepartion/propulsion and no oral residues in 80% of opportunities. Long-term Goals  Timeframe for Long-term Goals: 7 days  Goal 1: Patient will tolerate safest, least restrictive diet without overt s/sx of aspiration/penetration in 90% of opportunities. Safety Devices    Safety Devices  Restraints Initially in Place: No    Pain Assessment    Pain Assessment: Patient does not appear in pain    Pain Re-assessment    Pain Reassessment: Patient does not appear in pain    Therapy Time  Time In: 1325  Time out: 1335  Total Time: 10 Minutes    Patient seen in room for dysphagia therapy. Patient sitting upright in chair. Patient somewhat more alert than this morning. Patient open eyes intermittently and occasionally responds verbally to questions. ST provided oral care. Patient then accepted thin liquids via spoon. Patient demonstrated poor labial seal despite maximal verbal, visual, and tactile cueing. Patient demonstrated delayed swallow initiation and cough x 1/5 trials of thin water via teaspoon. ST unable to accept further trials. ST recommends continued diet of puree solids and nectar-thickened liquids when patient is alert. ST will continue to follow during inpatient stay.     Electronically signed: Burke Angeles             10/24/2022

## 2022-10-24 NOTE — PROGRESS NOTES
Pt vitals and reassessment completed at this time, see flowsheet. Pt breathing normal. Pt is sleeping in chair, alarm on. Writer was informed by aide that family of the pt called and asked that her ring be put somewhere locked and safe. The ring has a part that must have fallen off but is with the ring in the med lock box.

## 2022-10-25 PROBLEM — E44.1 MILD MALNUTRITION (HCC): Status: ACTIVE | Noted: 2022-10-25

## 2022-10-25 LAB
GLUCOSE BLD-MCNC: 107 MG/DL (ref 74–100)
GLUCOSE BLD-MCNC: 113 MG/DL (ref 74–100)
GLUCOSE BLD-MCNC: 84 MG/DL (ref 74–100)
GLUCOSE BLD-MCNC: 95 MG/DL (ref 74–100)

## 2022-10-25 PROCEDURE — 97110 THERAPEUTIC EXERCISES: CPT

## 2022-10-25 PROCEDURE — 6370000000 HC RX 637 (ALT 250 FOR IP): Performed by: NURSE PRACTITIONER

## 2022-10-25 PROCEDURE — 82947 ASSAY GLUCOSE BLOOD QUANT: CPT

## 2022-10-25 PROCEDURE — 6360000002 HC RX W HCPCS: Performed by: NURSE PRACTITIONER

## 2022-10-25 PROCEDURE — 6370000000 HC RX 637 (ALT 250 FOR IP): Performed by: FAMILY MEDICINE

## 2022-10-25 PROCEDURE — 94761 N-INVAS EAR/PLS OXIMETRY MLT: CPT

## 2022-10-25 PROCEDURE — 99232 SBSQ HOSP IP/OBS MODERATE 35: CPT | Performed by: INTERNAL MEDICINE

## 2022-10-25 PROCEDURE — 2580000003 HC RX 258: Performed by: NURSE PRACTITIONER

## 2022-10-25 PROCEDURE — APPSS30 APP SPLIT SHARED TIME 16-30 MINUTES: Performed by: NURSE PRACTITIONER

## 2022-10-25 PROCEDURE — 1200000000 HC SEMI PRIVATE

## 2022-10-25 RX ORDER — HALOPERIDOL 2 MG/1
2 TABLET ORAL 3 TIMES DAILY
Status: DISCONTINUED | OUTPATIENT
Start: 2022-10-25 | End: 2022-10-26

## 2022-10-25 RX ORDER — TAMSULOSIN HYDROCHLORIDE 0.4 MG/1
0.4 CAPSULE ORAL DAILY
Status: DISCONTINUED | OUTPATIENT
Start: 2022-10-25 | End: 2022-10-28 | Stop reason: HOSPADM

## 2022-10-25 RX ORDER — LORAZEPAM 0.5 MG/1
0.5 TABLET ORAL NIGHTLY PRN
Status: DISCONTINUED | OUTPATIENT
Start: 2022-10-25 | End: 2022-10-28 | Stop reason: HOSPADM

## 2022-10-25 RX ORDER — RISPERIDONE 0.25 MG/1
0.5 TABLET ORAL 2 TIMES DAILY
Status: DISCONTINUED | OUTPATIENT
Start: 2022-10-25 | End: 2022-10-26

## 2022-10-25 RX ORDER — LORAZEPAM 2 MG/ML
0.5 INJECTION INTRAMUSCULAR ONCE
Status: COMPLETED | OUTPATIENT
Start: 2022-10-26 | End: 2022-10-26

## 2022-10-25 RX ORDER — LORAZEPAM 0.5 MG/1
0.5 TABLET ORAL 2 TIMES DAILY PRN
Status: DISCONTINUED | OUTPATIENT
Start: 2022-10-25 | End: 2022-10-25

## 2022-10-25 RX ADMIN — ASPIRIN 81 MG: 81 TABLET, COATED ORAL at 08:15

## 2022-10-25 RX ADMIN — NYSTATIN 500000 UNITS: 100000 SUSPENSION ORAL at 08:16

## 2022-10-25 RX ADMIN — NYSTATIN 500000 UNITS: 100000 SUSPENSION ORAL at 12:03

## 2022-10-25 RX ADMIN — BENZTROPINE MESYLATE 1 MG: 1 TABLET ORAL at 08:15

## 2022-10-25 RX ADMIN — LORAZEPAM 0.5 MG: 0.5 TABLET ORAL at 22:45

## 2022-10-25 RX ADMIN — HALOPERIDOL 5 MG: 5 TABLET ORAL at 14:35

## 2022-10-25 RX ADMIN — LORAZEPAM 0.5 MG: 0.5 TABLET ORAL at 03:29

## 2022-10-25 RX ADMIN — BENZTROPINE MESYLATE 1 MG: 1 TABLET ORAL at 20:02

## 2022-10-25 RX ADMIN — SENNOSIDES 8.6 MG: 8.6 TABLET, FILM COATED ORAL at 20:02

## 2022-10-25 RX ADMIN — DOCUSATE SODIUM 100 MG: 100 CAPSULE, LIQUID FILLED ORAL at 08:16

## 2022-10-25 RX ADMIN — PRAVASTATIN SODIUM 40 MG: 20 TABLET ORAL at 08:16

## 2022-10-25 RX ADMIN — HALOPERIDOL 2 MG: 2 TABLET ORAL at 20:02

## 2022-10-25 RX ADMIN — DOCUSATE SODIUM 100 MG: 100 CAPSULE, LIQUID FILLED ORAL at 20:02

## 2022-10-25 RX ADMIN — CHLORHEXIDINE GLUCONATE 0.12% ORAL RINSE 15 ML: 1.2 LIQUID ORAL at 08:17

## 2022-10-25 RX ADMIN — SODIUM CHLORIDE: 9 INJECTION, SOLUTION INTRAVENOUS at 08:25

## 2022-10-25 RX ADMIN — TAMSULOSIN HYDROCHLORIDE 0.4 MG: 0.4 CAPSULE ORAL at 10:42

## 2022-10-25 RX ADMIN — CEFAZOLIN 2000 MG: 2 INJECTION, POWDER, FOR SOLUTION INTRAMUSCULAR; INTRAVENOUS at 12:05

## 2022-10-25 RX ADMIN — POTASSIUM CHLORIDE 10 MEQ: 10 TABLET, EXTENDED RELEASE ORAL at 08:16

## 2022-10-25 RX ADMIN — NYSTATIN 500000 UNITS: 100000 SUSPENSION ORAL at 17:12

## 2022-10-25 RX ADMIN — FOLIC ACID 1 MG: 1 TABLET ORAL at 08:15

## 2022-10-25 RX ADMIN — METOPROLOL TARTRATE 50 MG: 50 TABLET, FILM COATED ORAL at 20:02

## 2022-10-25 RX ADMIN — CEFAZOLIN 2000 MG: 2 INJECTION, POWDER, FOR SOLUTION INTRAMUSCULAR; INTRAVENOUS at 20:05

## 2022-10-25 RX ADMIN — PANTOPRAZOLE SODIUM 40 MG: 40 TABLET, DELAYED RELEASE ORAL at 08:15

## 2022-10-25 RX ADMIN — HALOPERIDOL 5 MG: 5 TABLET ORAL at 08:16

## 2022-10-25 RX ADMIN — RISPERIDONE 1 MG: 1 TABLET, FILM COATED ORAL at 08:16

## 2022-10-25 RX ADMIN — NYSTATIN 500000 UNITS: 100000 SUSPENSION ORAL at 20:04

## 2022-10-25 RX ADMIN — METOPROLOL TARTRATE 50 MG: 50 TABLET, FILM COATED ORAL at 08:16

## 2022-10-25 RX ADMIN — DIVALPROEX SODIUM 125 MG: 125 CAPSULE ORAL at 20:02

## 2022-10-25 RX ADMIN — DIVALPROEX SODIUM 125 MG: 125 CAPSULE ORAL at 14:35

## 2022-10-25 RX ADMIN — DIVALPROEX SODIUM 125 MG: 125 CAPSULE ORAL at 08:16

## 2022-10-25 RX ADMIN — CHLORHEXIDINE GLUCONATE 0.12% ORAL RINSE 15 ML: 1.2 LIQUID ORAL at 20:04

## 2022-10-25 RX ADMIN — THIAMINE HCL TAB 100 MG 100 MG: 100 TAB at 08:16

## 2022-10-25 RX ADMIN — RISPERIDONE 0.5 MG: 0.25 TABLET ORAL at 20:02

## 2022-10-25 RX ADMIN — CEFAZOLIN 2000 MG: 2 INJECTION, POWDER, FOR SOLUTION INTRAMUSCULAR; INTRAVENOUS at 04:51

## 2022-10-25 NOTE — PROGRESS NOTES
[facility] called back and stated they can not accept because of behavior and her insurance being out of network. Left a message for Adam Ren to see if they have decided if they will accept her back or not.        JUSTEN Roque

## 2022-10-25 NOTE — PROGRESS NOTES
1:1 discontinued at this time and a telesitter initiated in the room. Pt in bed with eyes closed, no s/s of distress noted. Will continue to monitor.

## 2022-10-25 NOTE — PROGRESS NOTES
[facility] SPECIALTY [facility]  SPEECH THERAPY  No Visit Note    [] ICU    [x] Acute   Patient: Florentino Huang  Room: 0729/7731-82      Florentino Huang not seen on 10/25/2022 at 10:14 AM due to Patient unable to arouse and not being appropriate for PO intake/therapy. ST will re-attempt when appropriate.            Signature: Ember Diggs

## 2022-10-25 NOTE — PROGRESS NOTES
Physical Therapy  Facility/Department: [address] [place] [address] MED SURG  Daily Treatment Note  NAME: Wandy Dior  : 1955  MRN: [id number]    Date of Service: 10/25/2022    Discharge Recommendations:  [address]      Patient Diagnosis(es): The primary encounter diagnosis was Hypernatremia. Diagnoses of Dehydration and Altered mental status, unspecified altered mental status type were also pertinent to this visit. Assessment   Assessment: PROM B LE's 2 x 15. Activity Tolerance: Patient limited by fatigue; Other (comment) (Patient eyes closed throughout session.)     Plan    Physcial Therapy Plan  General Plan: 2 times a day 7 days a week  Current Treatment Recommendations: Strengthening;ROM;Balance training;Functional mobility training;Transfer training;ADL/Self-care training;Neuromuscular re-education;Gait training; Safety education & training;Patient/Caregiver education & training     Restrictions  Restrictions/Precautions  Restrictions/Precautions: General Precautions, Fall Risk  Required Braces or Orthoses?: No     Subjective    Subjective  Subjective: Patient in bed upon arrival with telesitter. Orientation  Overall Orientation Status: Impaired  Orientation Level: Unable to assess     Objective      Bed Mobility Training  Bed Mobility Training: Yes  Overall Level of Assistance: Assist X2;Other (comment); Total assistance (Total assist for lateral scooting patient for more normalized positioning)  Interventions: Verbal cues; Other (comment)  Neuromuscular Education  Neuromuscular Education: No  PT Exercises  Exercise Treatment: PROM B LE's 2 x 15. Safety Devices  Type of Devices: Call light within reach; Left in bed;Bed alarm in place; Telesitter in use;Nurse notified     Goals  Short Term Goals  Time Frame for Short Term Goals: 3 DAYS  Short Term Goal 1: MOD ASSIST BED MOBILITY  Short Term Goal 2: MIN ASSIST TRANSFERS.   Long Term Goals  Time Frame for Long Term Goals : 4 WEEKS  Long Term Goal 1: MIN ASSIST TRANSFERS AND MIN ASSIST BED MOBILITY. Patient Goals   Patient Goals : UNABLE TO ASCERTAIN DUE TO IMPAIRED COGNITION AND LETHARGY.     Education  Patient Education  Education Given To: Patient  Education Provided Comments: Educated patient during PROM on benefits of ROM    Therapy Time   Individual Concurrent Group Co-treatment   Time In 1308         Time Out 1332         Minutes [address] [place] [address]

## 2022-10-25 NOTE — PROGRESS NOTES
Patient alert at this time. Writer gave PO Ativan 0.5mg with applesauce. Writer and Pormise PCT got patient's linens resituated and patient moved up in bed and got comfortable. Vital signs/assessment completed. Patient alert to voice. Patient continues to pull at lines while awake. 1:1 sitter continuous at bedside.

## 2022-10-25 NOTE — PROGRESS NOTES
Occupational Therapy  Facility/Department: Critical access hospital AT THE [facility] MED SURG  Daily Treatment Note  NAME: Ananya Gates  : 1955  MRN: [id number]    Date of Service: 10/25/2022    Discharge Recommendations:  Continue to assess pending progress, Subacute/Skilled Nursing Facility         Patient Diagnosis(es): The primary encounter diagnosis was Hypernatremia. Diagnoses of Dehydration and Altered mental status, unspecified altered mental status type were also pertinent to this visit. Assessment    Assessment: Pt sleeping throughout therapy session. Activity Tolerance: Patient limited by fatigue  Discharge Recommendations: Continue to assess pending progress;Subacute/Skilled Nursing Facility      Plan   Occupational Therapy Plan  Times Per Week: 7x/week  Times Per Day: Once a day  Current Treatment Recommendations: Strengthening; Endurance training;Balance training;Patient/Caregiver education & training; Safety education & training;Self-Care / ADL     Restrictions  Restrictions/Precautions  Restrictions/Precautions: General Precautions; Fall Risk    Subjective   Subjective  Subjective: Pt lying in bed sleeping with telesitter present. Pt difficult to awaken however would occ answer questions. Pain: Pt no complaints of pain this date. Objective    Vitals              OT Exercises  Exercise Treatment: Pt tolerated BUE PROM x 5 planes x 10 reps x 1 set to increase UE ROM and endurance in order to ease completion of ADL tasks. Pt's eyes closed throughout, however would occ respond to writer. Safety Devices  Type of Devices: Call light within reach; Left in bed;Bed alarm in place; Telesitter in use     Patient Education  Education Given To: Patient  Education Provided: Role of Therapy;Plan of Care  Education Method: Verbal  Barriers to Learning: Cognition  Education Outcome: Continued education needed    Goals  Short Term Goals  Time Frame for Short Term Goals: 10 visits  Short Term Goal 1: Pt will complete AROM/AAROM to BUE as tolerated to maintain joint range of motion for improved participation in ADL and functional transfers. Short Term Goal 2: Pt will complete functional transfers during ADL tasks modA to improve safety and participation in ADL tasks. Short Term Goal 3: Pt will participate in bathing tasks while seated in chair or upright in bed with moderate verbal prompting and modA.        Therapy Time   Individual Concurrent Group Co-treatment   Time In 5433         Time Out 1402         Minutes 15                 TAVO De Santiago/WSETHA

## 2022-10-25 NOTE — PLAN OF CARE
Problem: Discharge Planning  Goal: Discharge to home or other facility with appropriate resources  Outcome: Progressing  Flowsheets (Taken 10/23/2022 1912 by Ifeoma Rios, RN)  Discharge to home or other facility with appropriate resources: Identify barriers to discharge with patient and caregiver     Problem: Skin/Tissue Integrity  Goal: Absence of new skin breakdown  Description: 1. Monitor for areas of redness and/or skin breakdown  2. Assess vascular access sites hourly  3. Every 4-6 hours minimum:  Change oxygen saturation probe site  4. Every 4-6 hours:  If on nasal continuous positive airway pressure, respiratory therapy assess nares and determine need for appliance change or resting period. Outcome: Progressing  Note: Patient is being turned every two hours with pillows when allowed. No new open areas or redness noted.  Will continue to assess        Problem: Pain  Goal: Verbalizes/displays adequate comfort level or baseline comfort level  Outcome: Progressing  Flowsheets (Taken 10/23/2022 1912 by Ifeoma Rios RN)  Verbalizes/displays adequate comfort level or baseline comfort level:   Encourage patient to monitor pain and request assistance   Administer analgesics based on type and severity of pain and evaluate response   Consider cultural and social influences on pain and pain management   Implement non-pharmacological measures as appropriate and evaluate response   Assess pain using appropriate pain scale   Notify Licensed Independent Practitioner if interventions unsuccessful or patient reports new pain     Problem: Metabolic/Fluid and Electrolytes - Adult  Goal: Electrolytes maintained within normal limits  Outcome: Progressing  Flowsheets (Taken 10/23/2022 1912 by Ifeoma Rios RN)  Electrolytes maintained within normal limits:   Monitor labs and assess patient for signs and symptoms of electrolyte imbalances   Administer electrolyte replacement as ordered

## 2022-10-25 NOTE — PROGRESS NOTES
Vitals and assessment done. Patient does not follow commands or verbally answer questions. Patient is turning self in bed and making noises. Lung sounds are diminished no signs of distress. Heart rhythm is regular. Trace swelling noted. Patient remains a 1:1. No needs noted.

## 2022-10-25 NOTE — PROGRESS NOTES
[facility] - Progress Note  - Telemedicine  Today's Date and Time: 10/25/2022, 11:49 AM    Impression :   MSSA septicemia 10-11-22 x 2  Lt parotitis from dehydration and MSSA  Dehydration  Hypernatremia-resolved  Altered mental status  SHAINA- Resolved  Cirrhosis of the liver  Major depression with psychotic features  Oral candidiasis    Patient evaluated by Telemedicine. Requesting Institution: [facility]  Provider Institution: [address] [place] [address],[address] at [facility] [place]: Ms Hallie Kenisha, APRN- IM    Recommendations:     Continue cefazolin 2 gm IV q 8 hr. Stop date 11-13-22  Alternatively she could receive Ceftriaxone 2 gm IV q 24 hr until 11-13-22, if the facility where she is going can not accommodate IV infusions 3 times daily. Repeat blood cultures x 2 on 11-18-22 to document clearing of septicemia  Oral nystatin  Medical Decision Making/Summary/Discussion:10/25/2022       Infection Control Recommendations   [place] Precautions    Antimicrobial Stewardship Recommendations     Simplification of therapy  Targeted therapy    Coordination of Outpatient Care:   Estimated Length of IV antimicrobials:11-13-22  Patient will need Midline Catheter Insertion: yes  Patient will need PICC line Insertion:no  Patient will need: Home IV , [place],  SNF,  LTAC:  Yes  Patient will need outpatient wound care:No    Chief complaint/reason for consultation:   MSSA septicemia      History of Present Illness:   Rani Fenton is a 74y.o.-year-old  female who was initially admitted on 10/11/2022. Patient seen at the request of     INITIAL HISTORY:     Patient was transferred to [facility] fro [place] on 10-11-22 because of altered mental status. She was found to be severely hypernatremic with a Na of 161, dehydrated and in SHAINA. She has received hydration with improvement. Has been evaluated by Nephrology.      She has a past Hx of ETOH intake, cirrhosis of the liver with ascites, CAD, essential HTN, DM 2, GERD, bladder incontinence. ID service asked to evaluate because of S aureus septicemia on 10-13-22. CURRENT EVALUATION :10/25/2022  BP (!) 145/70   Pulse 56   Temp 97 °F (36.1 °C) (Temporal)   Resp 16   Ht 5' 6\" (1.676 m)   Wt 148 lb 9.6 oz (67.4 kg)   SpO2 97%   BMI 23.98 kg/m²     Afebrile  VS stable    Patient feels better  No complaints  No new issues per RN      Patient has underlying confusion and intermittent agitation    There is no growth on repeat blood cultures from 10/18/22  Midline placed for outpatient antibiotics    Discharge planning underway back to SNF  Difficulty finding a SNF that will accept the patient. Labs, X rays reviewed: 10/25/2022    BUN: 12-->8-->6-->4-->7-->5  Cr:0.58-->0.73-->0.79-->0.54-->0.54  Na 145-->147-->141-->140  K  4.6-->3.8-->4.1-->3.7    WBC: 8.3-->7.5  Hb:12.4  Plat: 80-->97    Cultures:  Urine:    Blood:  10-11-22: MSSA x 2  10-18-22: No growth   Sputum :    Wound:      Discussed with VICK, RN. I have personally reviewed the past medical history, past surgical history, medications, social history, and family history, and I have updated the database accordingly. Past Medical History:     Past Medical History:   Diagnosis Date    Alcohol dependence in remission [facility])     Alcoholic cirrhosis of liver without ascites (HCC)     ASHD (arteriosclerotic heart disease)     Bacteremia due to Staphylococcus aureus 10/13/2022    Depression     Diabetes mellitus (HCC)     GERD (gastroesophageal reflux disease)     Hepatitis C     WITH FULL RECOVERY - INFECTIOUS DISEASE DR. eFlix Reilly - LAST VISIT 7/2020    Hyperlipidemia     Hypertension     Irregular heartbeat     Murmur, cardiac     noted by PCP, no echo per patient    Urinary bladder incontinence     Wears eyeglasses     Well adult health check     PCP - DR. Jacky Kenyon - 3/2021    Well adult health check INFECTIOUS DISEASE - DR. Gabrielle Booker    [facility] adult health check     GI - DR. Minnie LEONARD       Past Surgical  History:     Past Surgical History:   Procedure Laterality Date    BREAST SURGERY Right     REMOVAL BENIGN TUMOR    COLONOSCOPY      HIP SURGERY Right 1964    RECONSTRUCTION    TONSILLECTOMY      AS A CHILD    TUBAL LIGATION  1975    VITRECTOMY Left 2021    VITRECTOMY Left 2021    VITRECTOMY 25 GAUGE, MEMBRANE PEEL, ICG performed by Anthony Khan MD at [place] [address]       Medications:      tamsulosin  0.4 mg Oral Daily    haloperidol  5 mg Oral TID    risperiDONE  1 mg Oral BID    lidocaine 1 % injection  5 mL IntraDERmal Once    sodium chloride flush  5-40 mL IntraVENous 2 times per day    docusate sodium  100 mg Oral BID    senna  1 tablet Oral Nightly    chlorhexidine  15 mL Mouth/Throat BID    nystatin  5 mL Oral 4x Daily    ceFAZolin  2,000 mg IntraVENous Q8H    insulin lispro  0-8 Units SubCUTAneous TID WC    insulin lispro  0-4 Units SubCUTAneous Nightly    aspirin  81 mg Oral Daily    benztropine  1 mg Oral BID    divalproex  125 mg Oral TID    folic acid  1 mg Oral Daily    metoprolol tartrate  50 mg Oral BID    pantoprazole  40 mg Oral QAM AC    potassium chloride  10 mEq Oral Daily    pravastatin  40 mg Oral Daily    vitamin B-1  100 mg Oral Daily    VORTIoxetine HBr  20 mg Oral Daily    sodium chloride flush  5-40 mL IntraVENous 2 times per day       Social History:     Social History     Socioeconomic History    Marital status: Single     Spouse name: Not on file    Number of children: Not on file    Years of education: Not on file    Highest education level: Not on file   Occupational History    Not on file   Tobacco Use    Smoking status: Former     Types: Cigarettes     Quit date:      Years since quittin.8    Smokeless tobacco: Never   Vaping Use    Vaping Use: Never used   Substance and Sexual Activity    Alcohol use: Not on file    Drug use: Not on file    Sexual activity: Not on file   Other Topics Concern    Not on file   Social History Narrative    Not on file     Social Determinants of Health     Financial Resource Strain: Not on file   Food Insecurity: Not on file   Transportation Needs: Not on file   Physical Activity: Not on file   Stress: Not on file   Social Connections: Not on file   Intimate Partner Violence: Not on file   Housing Stability: Not on file       Family History:   History reviewed. No pertinent family history. Allergies:   Lisinopril     Review of Systems:     Patient unable to provide ROS. Confused and agitated. 10/25/2022        Constitutional: No fevers or chills. No systemic complaints  Head: No headaches  Eyes: No double vision or blurry vision. No conjunctival inflammation. ENT: No sore throat or runny nose. . No hearing loss, tinnitus or vertigo. Cardiovascular: No chest pain or palpitations. No shortness of breath. No TESFAYE  Lung: No shortness of breath or cough. No sputum production  Abdomen: No nausea, vomiting, diarrhea, or abdominal pain. Ken Medin No cramps. Genitourinary: No increased urinary frequency, or dysuria. No hematuria. No suprapubic or CVA pain  Musculoskeletal: No muscle aches or pains. No joint effusions, swelling or deformities  Hematologic: No bleeding or bruising. Neurologic: No headache, weakness, numbness, or tingling. Integument: No rash, no ulcers. Psychiatric: No depression. Endocrine: No polyuria, no polydipsia, no polyphagia. Physical Examination :   Patient Vitals for the past 8 hrs:   BP Temp Temp src Pulse Resp SpO2 Weight   10/25/22 0745 (!) 145/70 97 °F (36.1 °C) Temporal 56 16 97 % --   10/25/22 0433 -- -- -- -- -- -- 148 lb 9.6 oz (67.4 kg)     General Appearance: Awake, mentation altered and in no apparent distress  Head:  Normocephalic, no trauma  Eyes: Pupils equal, round, reactive to light and accommodation; extraocular movements intact; sclera anicteric; conjunctivae pink.  No embolic phenomena. ENT: Oropharynx clear, without erythema, exudate, or thrush. No tenderness of sinuses. Mouth/throat: mucosa pink and moist. No lesions. Lt side parotitis   Neck:Supple, without lymphadenopathy. Thyroid normal, No bruits. Pulmonary/Chest: Clear to auscultation, without wheezes, rales, or rhonchi. No dullness to percussion. Cardiovascular: Regular rate and rhythm without murmurs, rubs, or gallops. Abdomen: Soft, non tender. Bowel sounds normal. No organomegaly. Ascites. All four Extremities: No cyanosis, clubbing, edema, or effusions. Neurologic: No gross sensory or motor deficits. Confused and agitated . Has tremors  Skin: Warm and dry with good turgor. No signs of peripheral arterial or venous insufficiency. No ulcerations. No open wounds. Medical Decision Making -Laboratory:   I have independently reviewed/ordered the following labs:    CBC with Differential:   No results for input(s): WBC, HGB, HCT, PLT, SEGSPCT, BANDSPCT, LYMPHOPCT, MONOPCT, EOSPCT in the last 72 hours. BMP:   Recent Labs     10/24/22  0520      K 3.7      CO2 26   BUN 5*   CREATININE 0.54     Hepatic Function Panel:   No results for input(s): PROT, LABALBU, BILIDIR, IBILI, BILITOT, ALKPHOS, ALT, AST in the last 72 hours. No results for input(s): RPR in the last 72 hours. No results for input(s): HIV in the last 72 hours. No results for input(s): BC in the last 72 hours. Lab Results   Component Value Date/Time    MUCUS TRACE 10/11/2022 07:00 PM    RBC 4.23 10/18/2022 06:20 AM    WBC 7.5 10/18/2022 06:20 AM    TURBIDITY Clear 10/11/2022 07:00 PM     Lab Results   Component Value Date/Time    CREATININE 0.54 10/24/2022 05:20 AM    GLUCOSE 117 10/24/2022 05:20 AM       Medical Decision Making-Imaging:     EXAMINATION:   ONE XRAY VIEW OF THE CHEST       10/11/2022 10:30 am       COMPARISON:   None.        HISTORY:   ORDERING SYSTEM PROVIDED HISTORY: altered mental status   TECHNOLOGIST PROVIDED HISTORY: altered mental status       FINDINGS:   The heart is not enlarged. No pulmonary venous congestion or edema. No   convincing lung consolidation or infiltrate. No pleural effusion or   pneumothorax. Osseous structures grossly intact. Impression   No acute cardiopulmonary process     Medical Decision Ydvqpw-Lwrzpluh-Irbam:       Medical Decision Making-Other:     Note:  Labs, medications, radiologic studies were reviewed with personal review of films  Large amounts of data were reviewed  Discussed with nursing Staff, Discharge planner  Infection Control and Prevention measures reviewed  All prior entries were reviewed  Administer medications as ordered  Prognosis: Guarded  Discharge planning reviewed  Follow up as outpatient. Thank you for allowing us to participate in the care of this patient. Please call with questions. Waldemar Mckeon, ROB - CNP    ATTESTATION:    I have discussed the case, including pertinent history and exam findings with the APRN. I have evaluated the  History, physical findings and pictures of the patient and the key elements of the encounter have been performed by me. I have reviewed the laboratory data, other diagnostic studies and discussed them with the APRN. I have updated the medical record where necessary. I agree with the assessment, plan and orders as documented by the APRN.     Saul Rae MD.        Pager: [phone] - Office: [phone]

## 2022-10-25 NOTE — PROGRESS NOTES
Progress Note    SUBJECTIVE:    Patient seen for f/u of Hypernatremia. She resting in bed eyes closed  easy resp. No distress. Sat up in the chair yesterday and continued to sleep. Poor oral intake. ROS: Unable due to altered mental status     All other systems were reviewed with the patient and are negative unless otherwise stated in HPI      OBJECTIVE:      Vitals:   Vitals:    10/25/22 0745   BP: (!) 145/70   Pulse: 56   Resp: 16   Temp: 97 °F (36.1 °C)   SpO2: 97%     Weight: 148 lb 9.6 oz (67.4 kg)   Height: 5' 6\" (167.6 cm)     Weight  Wt Readings from Last 3 Encounters:   10/25/22 148 lb 9.6 oz (67.4 kg)   04/22/21 180 lb (81.6 kg)     Body mass index is 23.98 kg/m². 24HR INTAKE/OUTPUT:      Intake/Output Summary (Last 24 hours) at 10/25/2022 0948  Last data filed at 10/25/2022 0846  Gross per 24 hour   Intake 2214 ml   Output 1425 ml   Net 789 ml     -----------------------------------------------------------------  Exam:    GEN:   eyes closed awakens easily   EYES:   EOMI, pupils equal   NECK: Supple. No lymphadenopathy. No carotid bruit  FACE/MOUTH:  no further palpable left parotitis improving oral thrush resolved but mucous membranes are dry  CVS:     regular rate and rhythm, no audible murmur  PULM:  CTA, no wheezes, rales or rhonchi, no acute respiratory distress  ABD:     Bowels sounds normal.  Abdomen is soft. No distention. no tenderness to palpation. EXT:     no edema bilaterally . No calf tenderness. NEURO: Moves all extremities. Motor and sensory are grossly intact. Visible tremors   SKIN:    No rashes.   No skin lesions    -----------------------------------------------------------------    Diagnostic Data:        Last 3 Blood Glucose:   Recent Labs     10/24/22  0520   GLUCOSE 117*        Comprehensive Metabolic Profile:   Recent Labs     10/24/22  0520      K 3.7      CO2 26   BUN 5*   CREATININE 0.54   GLUCOSE 117*   CALCIUM 8.0*        Urinalysis:   Lab Results Component Value Date/Time    NITRU NEGATIVE 10/11/2022 07:00 PM    COLORU Yellow 10/11/2022 07:00 PM    PHUR 6.0 10/11/2022 07:00 PM    WBCUA 2 TO 5 10/11/2022 07:00 PM    RBCUA 2 TO 5 10/11/2022 07:00 PM    MUCUS TRACE 10/11/2022 07:00 PM    BACTERIA 1+ 10/11/2022 07:00 PM    SPECGRAV 1.020 10/11/2022 07:00 PM    LEUKOCYTESUR NEGATIVE 10/11/2022 07:00 PM    UROBILINOGEN ELEVATED 10/11/2022 07:00 PM    BILIRUBINUR NEGATIVE 10/11/2022 07:00 PM    GLUCOSEU 3+ 10/11/2022 07:00 PM    KETUA NEGATIVE 10/11/2022 07:00 PM       HgBA1c:    Lab Results   Component Value Date/Time    LABA1C 6.8 10/11/2022 09:35 AM       Radiology/Imaging:  XR CHEST (2 VW)   Final Result   No acute process. XR CHEST PORTABLE   Final Result   No acute cardiopulmonary process         CT Head W/O Contrast   Final Result   No acute intracranial abnormality. Prominent senescent changes. These appear more prominent than usually seen   in a patient of this age, but could be due to old injuries, various   medications, or history of substance abuse.                ASSESSMENT / PLAN:  Hypernatremia  Resolved  Appreciate Nephrology  IVF  Trend labs  Dehydration  IVF  Trend labs-improving  Possibly due to oversedation, decreased oral intake, psychotropic medications including other medications such as lactulose and Jardiance  Type 2 diabetes  POCT before meals and at bedtime  Insulin sliding scale  Hypoglycemia protocol  Hold Jardiance  Major depressive disorder with psychotic features  Stop Geodon   Continue Cogentin, Depakote, Trintellix,   Continue Risperdal to 1 mg bid  Continue oral Haldol to 5 mg tid  Change Ativan 0.5 mg to  nightly prn as needed  Hold Abilify  Bacteremia  Appreciate ID for ATB for DC   Stop Levaquin  Continue  Ancef through 11/13/2022  BC - Staph Aureus   Repeat BC - resolving  Thrush  Resolved  Completed IV Diflucan  Continue Peridox  Constipation  Resolved  Continue Colace, Senokot  Witnessed ALEXX  Decreased responsiveness  Decreased doses of Haldol, Ativan and Risperodol  Essential hypertension  Continue Lopressor  Nutrition status:   at risk for malnutrition  Dietician consult initiated  Hospital Prophylaxis:   DVT: Lovenox   Stress Ulcer: H2 Blocker   High risk medications: none   Disposition:    Discharge plan is - Return to [facility] when medically cleared  Continuing to look at other options  IV ATB through 11/13 Ancef or Rocephin will be fine      ROB Craft - CNP , ROB, NP-C  Hospitalist Medicine        10/25/2022, 9:48 AM

## 2022-10-25 NOTE — PROGRESS NOTES
Left several messages for [facility] and Rehab to call . So we can settle if they will take her back and if they started working on insurance process.     JUSTEN Bauman

## 2022-10-25 NOTE — PROGRESS NOTES
Comprehensive Nutrition Assessment    Type and Reason for Visit:  Reassess    Nutrition Recommendations/Plan:   Alternative feeding needs considered     Malnutrition Assessment:  Malnutrition Status:  Mild malnutrition (10/25/22 1011)    Context:  Acute Illness     Findings of the 6 clinical characteristics of malnutrition:  Energy Intake:  75% or less of estimated energy requirements for 7 or more days  Weight Loss:  No significant weight loss     Body Fat Loss:  No significant body fat loss     Muscle Mass Loss:  No significant muscle mass loss    Fluid Accumulation:  Mild Extremities (facial)   Strength:  Not Performed    Nutrition Assessment:    Worsening nutrition intakes with 1-25% meals and supplements reported. Improved serum Na+ and glucose. Weight with mild losses, supported by ivf hydration. Plans for continued IV antibiotics noted, and if cannot improve PO acceptance (keep awake), would recommend alternative feeding. Is currently Day #14 with poor nutrition intakes. Meets criteria for mild malnutrition currently. Nutrition Related Findings:    BLE and facial edema. Wound Type: None       Current Nutrition Intake & Therapies:    Average Meal Intake: 1-25%  Average Supplements Intake: 1-25%  ADULT DIET; Dysphagia - Pureed; 4 carb choices (60 gm/meal); Mildly Thick (Nectar)  ADULT ORAL NUTRITION SUPPLEMENT; Breakfast, Lunch, Dinner; Other Oral Supplement; Two Bruno HN    Anthropometric Measures:  Height: 5' 6\" (167.6 cm)  Ideal Body Weight (IBW): 130 lbs (59 kg)    Admission Body Weight: 151 lb 6.4 oz (68.7 kg)  Current Body Weight: 148 lb 9.6 oz (67.4 kg), 116.5 % IBW. Weight Source: Bed Scale  Current BMI (kg/m2): 24  Usual Body Weight: 180 lb (81.6 kg)  % Weight Change (Calculated): -17.4  Weight Adjustment For: No Adjustment                 BMI Categories: Normal Weight (BMI 18.5-24. 9)    Estimated Daily Nutrient Needs:  Energy Requirements Based On: Kcal/kg  Weight Used for Energy Requirements: Current  Energy (kcal/day): 9643-8583 (20-25)  Weight Used for Protein Requirements: Current  Protein (g/day): 75-89 (1.1-1.3)  Method Used for Fluid Requirements: 1 ml/kcal  Fluid (ml/day): 1700+    Nutrition Diagnosis:   Other (Comment) (mild malnutrition) related to inadequate protein-energy intake as evidenced by intake 0-25%, weight loss    Lab Results   Component Value Date     10/24/2022    K 3.7 10/24/2022     10/24/2022    CO2 26 10/24/2022    BUN 5 (L) 10/24/2022    CREATININE 0.54 10/24/2022    GLUCOSE 117 (H) 10/24/2022    CALCIUM 8.0 (L) 10/24/2022    PROT 5.9 (L) 10/20/2022    LABALBU 3.1 (L) 10/20/2022    BILITOT 0.3 10/20/2022    ALKPHOS 69 10/20/2022    AST 24 10/20/2022    ALT 12 10/20/2022    LABGLOM >60 10/24/2022     Nutrition Interventions:   Food and/or Nutrient Delivery: Start Tube Feeding, Continue Current Diet, Continue Oral Nutrition Supplement  Nutrition Education/Counseling: Education not appropriate  Coordination of Nutrition Care: Continue to monitor while inpatient  Plan of Care discussed with: nursing    Goals:  Previous Goal Met: Progress towards Goal(s) Declining  Goals: Meet at least 75% of estimated needs       Nutrition Monitoring and Evaluation:   Behavioral-Environmental Outcomes:  Other (Comment) (cognition)  Food/Nutrient Intake Outcomes: Food and Nutrient Intake, Supplement Intake  Physical Signs/Symptoms Outcomes: Fluid Status or Edema, Biochemical Data, Weight    Discharge Planning:    No discharge needs at this time     Anisa Lackey, [address], [address] [place] [address] Street: [id number]

## 2022-10-25 NOTE — PROGRESS NOTES
Ativan 0.5mg PO given at this time. Pt very restless, pulling at meeks catheter tubing, and trying to get up out of bed. Sitter remains at bedside. Will continue to monitor.

## 2022-10-25 NOTE — PROGRESS NOTES
[facility] SPECIALTY [facility]  SPEECH THERAPY  No Visit Note    [] ICU    [x] Acute   Patient: Marino Segura  Room: 4702/3296-05      Marino Segura not seen on 10/25/2022 at 3:41 PM due to patient unable to arouse enough for oral intake. RN and ST repositioned patient upright. ST provided oral care, but patient would not accept PO trials. ST will re-attempt when appropriate.      Signature: Ariana Isaac

## 2022-10-26 LAB
ANION GAP SERPL CALCULATED.3IONS-SCNC: 8 MMOL/L (ref 9–17)
BUN BLDV-MCNC: 4 MG/DL (ref 8–23)
BUN/CREAT BLD: 9 (ref 9–20)
CALCIUM SERPL-MCNC: 8.4 MG/DL (ref 8.6–10.4)
CHLORIDE BLD-SCNC: 106 MMOL/L (ref 98–107)
CO2: 26 MMOL/L (ref 20–31)
CREAT SERPL-MCNC: 0.47 MG/DL (ref 0.5–0.9)
GFR SERPL CREATININE-BSD FRML MDRD: >60 ML/MIN/1.73M2
GLUCOSE BLD-MCNC: 106 MG/DL (ref 70–99)
GLUCOSE BLD-MCNC: 128 MG/DL (ref 74–100)
GLUCOSE BLD-MCNC: 153 MG/DL (ref 74–100)
GLUCOSE BLD-MCNC: 88 MG/DL (ref 74–100)
GLUCOSE BLD-MCNC: 99 MG/DL (ref 74–100)
MAGNESIUM: 1.7 MG/DL (ref 1.6–2.6)
POTASSIUM SERPL-SCNC: 3.5 MMOL/L (ref 3.7–5.3)
SODIUM BLD-SCNC: 140 MMOL/L (ref 135–144)

## 2022-10-26 PROCEDURE — 1200000000 HC SEMI PRIVATE

## 2022-10-26 PROCEDURE — 6360000002 HC RX W HCPCS: Performed by: NURSE PRACTITIONER

## 2022-10-26 PROCEDURE — 80048 BASIC METABOLIC PNL TOTAL CA: CPT

## 2022-10-26 PROCEDURE — 6360000002 HC RX W HCPCS: Performed by: STUDENT IN AN ORGANIZED HEALTH CARE EDUCATION/TRAINING PROGRAM

## 2022-10-26 PROCEDURE — 82947 ASSAY GLUCOSE BLOOD QUANT: CPT

## 2022-10-26 PROCEDURE — 99232 SBSQ HOSP IP/OBS MODERATE 35: CPT | Performed by: INTERNAL MEDICINE

## 2022-10-26 PROCEDURE — 36415 COLL VENOUS BLD VENIPUNCTURE: CPT

## 2022-10-26 PROCEDURE — 51702 INSERT TEMP BLADDER CATH: CPT

## 2022-10-26 PROCEDURE — 97140 MANUAL THERAPY 1/> REGIONS: CPT

## 2022-10-26 PROCEDURE — 94761 N-INVAS EAR/PLS OXIMETRY MLT: CPT

## 2022-10-26 PROCEDURE — 6370000000 HC RX 637 (ALT 250 FOR IP): Performed by: FAMILY MEDICINE

## 2022-10-26 PROCEDURE — 6370000000 HC RX 637 (ALT 250 FOR IP): Performed by: NURSE PRACTITIONER

## 2022-10-26 PROCEDURE — APPSS30 APP SPLIT SHARED TIME 16-30 MINUTES: Performed by: NURSE PRACTITIONER

## 2022-10-26 PROCEDURE — 97530 THERAPEUTIC ACTIVITIES: CPT

## 2022-10-26 PROCEDURE — 92526 ORAL FUNCTION THERAPY: CPT

## 2022-10-26 PROCEDURE — 2580000003 HC RX 258: Performed by: NURSE PRACTITIONER

## 2022-10-26 PROCEDURE — 83735 ASSAY OF MAGNESIUM: CPT

## 2022-10-26 RX ORDER — HALOPERIDOL 2 MG/1
2 TABLET ORAL 2 TIMES DAILY
Status: DISCONTINUED | OUTPATIENT
Start: 2022-10-26 | End: 2022-10-27

## 2022-10-26 RX ORDER — HALOPERIDOL 5 MG/1
5 TABLET ORAL NIGHTLY
Status: DISCONTINUED | OUTPATIENT
Start: 2022-10-26 | End: 2022-10-27

## 2022-10-26 RX ORDER — RISPERIDONE 0.25 MG/1
0.5 TABLET ORAL NIGHTLY
Status: DISCONTINUED | OUTPATIENT
Start: 2022-10-26 | End: 2022-10-28

## 2022-10-26 RX ADMIN — THIAMINE HCL TAB 100 MG 100 MG: 100 TAB at 09:53

## 2022-10-26 RX ADMIN — RISPERIDONE 0.5 MG: 0.25 TABLET ORAL at 23:07

## 2022-10-26 RX ADMIN — CHLORHEXIDINE GLUCONATE 0.12% ORAL RINSE 15 ML: 1.2 LIQUID ORAL at 23:06

## 2022-10-26 RX ADMIN — BENZTROPINE MESYLATE 1 MG: 1 TABLET ORAL at 23:08

## 2022-10-26 RX ADMIN — DOCUSATE SODIUM 100 MG: 100 CAPSULE, LIQUID FILLED ORAL at 23:07

## 2022-10-26 RX ADMIN — SENNOSIDES 8.6 MG: 8.6 TABLET, FILM COATED ORAL at 23:07

## 2022-10-26 RX ADMIN — POTASSIUM CHLORIDE 10 MEQ: 10 TABLET, EXTENDED RELEASE ORAL at 09:53

## 2022-10-26 RX ADMIN — CEFAZOLIN 2000 MG: 2 INJECTION, POWDER, FOR SOLUTION INTRAMUSCULAR; INTRAVENOUS at 20:21

## 2022-10-26 RX ADMIN — DOCUSATE SODIUM 100 MG: 100 CAPSULE, LIQUID FILLED ORAL at 09:53

## 2022-10-26 RX ADMIN — NYSTATIN 500000 UNITS: 100000 SUSPENSION ORAL at 16:30

## 2022-10-26 RX ADMIN — HALOPERIDOL 2 MG: 2 TABLET ORAL at 09:53

## 2022-10-26 RX ADMIN — HALOPERIDOL 2 MG: 2 TABLET ORAL at 13:59

## 2022-10-26 RX ADMIN — FOLIC ACID 1 MG: 1 TABLET ORAL at 09:53

## 2022-10-26 RX ADMIN — NYSTATIN 500000 UNITS: 100000 SUSPENSION ORAL at 23:07

## 2022-10-26 RX ADMIN — CEFAZOLIN 2000 MG: 2 INJECTION, POWDER, FOR SOLUTION INTRAMUSCULAR; INTRAVENOUS at 12:15

## 2022-10-26 RX ADMIN — METOPROLOL TARTRATE 50 MG: 50 TABLET, FILM COATED ORAL at 09:53

## 2022-10-26 RX ADMIN — DIVALPROEX SODIUM 125 MG: 125 CAPSULE ORAL at 13:59

## 2022-10-26 RX ADMIN — ASPIRIN 81 MG: 81 TABLET, COATED ORAL at 09:53

## 2022-10-26 RX ADMIN — LORAZEPAM 0.5 MG: 2 INJECTION INTRAMUSCULAR; INTRAVENOUS at 00:00

## 2022-10-26 RX ADMIN — DIVALPROEX SODIUM 125 MG: 125 CAPSULE ORAL at 23:07

## 2022-10-26 RX ADMIN — BENZTROPINE MESYLATE 1 MG: 1 TABLET ORAL at 09:53

## 2022-10-26 RX ADMIN — PRAVASTATIN SODIUM 40 MG: 20 TABLET ORAL at 09:53

## 2022-10-26 RX ADMIN — CHLORHEXIDINE GLUCONATE 0.12% ORAL RINSE 15 ML: 1.2 LIQUID ORAL at 09:54

## 2022-10-26 RX ADMIN — NYSTATIN 500000 UNITS: 100000 SUSPENSION ORAL at 14:00

## 2022-10-26 RX ADMIN — CEFAZOLIN 2000 MG: 2 INJECTION, POWDER, FOR SOLUTION INTRAMUSCULAR; INTRAVENOUS at 03:33

## 2022-10-26 RX ADMIN — HALOPERIDOL 5 MG: 5 TABLET ORAL at 23:07

## 2022-10-26 RX ADMIN — TAMSULOSIN HYDROCHLORIDE 0.4 MG: 0.4 CAPSULE ORAL at 09:54

## 2022-10-26 RX ADMIN — LORAZEPAM 0.5 MG: 0.5 TABLET ORAL at 23:07

## 2022-10-26 RX ADMIN — PANTOPRAZOLE SODIUM 40 MG: 40 TABLET, DELAYED RELEASE ORAL at 09:53

## 2022-10-26 RX ADMIN — SODIUM CHLORIDE: 9 INJECTION, SOLUTION INTRAVENOUS at 16:35

## 2022-10-26 RX ADMIN — NYSTATIN 500000 UNITS: 100000 SUSPENSION ORAL at 09:54

## 2022-10-26 RX ADMIN — DIVALPROEX SODIUM 125 MG: 125 CAPSULE ORAL at 09:53

## 2022-10-26 NOTE — PLAN OF CARE
Problem: ABCDS Injury Assessment  Goal: Absence of physical injury  Outcome: Progressing  Note: Patient is encouraged to reposition and assessed for any injuries. Will continue to monitor this shift. Problem: Skin/Tissue Integrity  Goal: Absence of new skin breakdown  Description: 1. Monitor for areas of redness and/or skin breakdown  2. Assess vascular access sites hourly  3. Every 4-6 hours minimum:  Change oxygen saturation probe site  4. Every 4-6 hours:  If on nasal continuous positive airway pressure, respiratory therapy assess nares and determine need for appliance change or resting period. Outcome: Progressing  Note: Patient able to reposition self at this time and restless constantly moving herself in the bed. Will continue to monitor this shift      Problem: Safety - Adult  Goal: Free from fall injury  Outcome: Progressing  Note: Bed alarm on, bed locked, side rails up, sitter at bedside, call light within reach. Will continue to monitor this shift. Problem: Nutrition Deficit:  Goal: Optimize nutritional status  Outcome: Progressing  Flowsheets (Taken 10/26/2022 1836)  Nutrient intake appropriate for improving, restoring, or maintaining nutritional needs: Assess nutritional status and recommend course of action  Note: Encouraging foods and liquids as tolerated. Will continue to monitor this shift.       Problem: Pain  Goal: Verbalizes/displays adequate comfort level or baseline comfort level  Outcome: Progressing  Flowsheets  Taken 10/26/2022 1836  Verbalizes/displays adequate comfort level or baseline comfort level:   Encourage patient to monitor pain and request assistance   Assess pain using appropriate pain scale   Administer analgesics based on type and severity of pain and evaluate response   Implement non-pharmacological measures as appropriate and evaluate response  Taken 10/26/2022 1812  Verbalizes/displays adequate comfort level or baseline comfort level: Encourage patient to monitor pain and request assistance  Note: Patient does not appear to be in pain at this time using correct pain scale. Will continue to monitor this shift.       Problem: Metabolic/Fluid and Electrolytes - Adult  Goal: Electrolytes maintained within normal limits  Outcome: Progressing  Flowsheets  Taken 10/26/2022 1836  Electrolytes maintained within normal limits: Monitor labs and assess patient for signs and symptoms of electrolyte imbalances  Taken 10/26/2022 1812  Electrolytes maintained within normal limits: Monitor labs and assess patient for signs and symptoms of electrolyte imbalances     Problem: Metabolic/Fluid and Electrolytes - Adult  Goal: Glucose maintained within prescribed range  Outcome: Progressing  Flowsheets  Taken 10/26/2022 1836  Glucose maintained within prescribed range:   Monitor blood glucose as ordered   Assess for signs and symptoms of hyperglycemia and hypoglycemia  Taken 10/26/2022 1812  Glucose maintained within prescribed range: Monitor blood glucose as ordered

## 2022-10-26 NOTE — PROGRESS NOTES
Patient is awake at this time and is becoming very restless and agitated and attempting to get out of bed. Writer and Roz Don RN to bedside. Patient states she is hungry, patient ate most of a plate of berries, a cup of ice cream, and a cup of pudding and had a drink of apple juice. Patient then refuses to eat anything else. After feeding patient, she is still restless and agitated, attempting to climb out of bed and pull at her meeks. Ki Ruiz PCT to bedside to sit with the patient and nursing supervisor made aware that patient may need to be a 1:1 now. Will continue to monitor patient.

## 2022-10-26 NOTE — PROGRESS NOTES
Spoke with Koha Olivas at [facility] who states that they are accepting Patient for readmission to their facility pending the insurance pre cert. Requested information FAXED to facility per this writer. Will assist with discharge placement details as appropriate.     Micheleda. Carolyne Farah 71, [facility]  10/26/2022

## 2022-10-26 NOTE — PROGRESS NOTES
Patient remains restless in bed and has been throwing her legs over the edge of the bed. Telesitter remains in use. Writer continues to make more frequent rounds.

## 2022-10-26 NOTE — PROGRESS NOTES
Progress Note    SUBJECTIVE:    Patient seen for f/u of Hypernatremia. She resting in bed eyes closed  easy resp. No distress. Continue to encourage oral intake    ROS: Unable due to altered mental status     All other systems were reviewed with the patient and are negative unless otherwise stated in HPI      OBJECTIVE:      Vitals:   Vitals:    10/26/22 0930   BP: (!) 142/74   Pulse: 57   Resp: 16   Temp: 97 °F (36.1 °C)   SpO2: 96%     Weight: 147 lb (66.7 kg)   Height: 5' 6\" (167.6 cm)     Weight  Wt Readings from Last 3 Encounters:   10/26/22 147 lb (66.7 kg)   04/22/21 180 lb (81.6 kg)     Body mass index is 23.73 kg/m². 24HR INTAKE/OUTPUT:      Intake/Output Summary (Last 24 hours) at 10/26/2022 1006  Last data filed at 10/26/2022 0930  Gross per 24 hour   Intake 517 ml   Output 2175 ml   Net -1658 ml     -----------------------------------------------------------------  Exam:    GEN:   eyes closed easy resp. No distress Stimulation given to awaken and encourage oral intake  EYES:   EOMI, pupils equal   NECK: Supple. No lymphadenopathy. No carotid bruit  CVS:     regular rate and rhythm, no audible murmur  PULM:  diminished but CTA, no wheezes, rales or rhonchi, no acute respiratory distress  ABD:     Bowels sounds normal.  Abdomen is soft. No distention. no tenderness to palpation. EXT:     no edema bilaterally . No calf tenderness. NEURO: Moves all extremities. Motor and sensory are grossly intact. SKIN:    No rashes.   No skin lesions    -----------------------------------------------------------------    Diagnostic Data:        Last 3 Blood Glucose:   Recent Labs     10/24/22  0520 10/26/22  0538   GLUCOSE 117* 106*        Comprehensive Metabolic Profile:   Recent Labs     10/24/22  0520 10/26/22  0538    140   K 3.7 3.5*    106   CO2 26 26   BUN 5* 4*   CREATININE 0.54 0.47*   GLUCOSE 117* 106*   CALCIUM 8.0* 8.4*        Urinalysis:   Lab Results   Component Value Date/Time NITRU NEGATIVE 10/11/2022 07:00 PM    COLORU Yellow 10/11/2022 07:00 PM    PHUR 6.0 10/11/2022 07:00 PM    WBCUA 2 TO 5 10/11/2022 07:00 PM    RBCUA 2 TO 5 10/11/2022 07:00 PM    MUCUS TRACE 10/11/2022 07:00 PM    BACTERIA 1+ 10/11/2022 07:00 PM    SPECGRAV 1.020 10/11/2022 07:00 PM    LEUKOCYTESUR NEGATIVE 10/11/2022 07:00 PM    UROBILINOGEN ELEVATED 10/11/2022 07:00 PM    BILIRUBINUR NEGATIVE 10/11/2022 07:00 PM    GLUCOSEU 3+ 10/11/2022 07:00 PM    KETUA NEGATIVE 10/11/2022 07:00 PM       HgBA1c:    Lab Results   Component Value Date/Time    LABA1C 6.8 10/11/2022 09:35 AM       Radiology/Imaging:  XR CHEST (2 VW)   Final Result   No acute process. XR CHEST PORTABLE   Final Result   No acute cardiopulmonary process         CT Head W/O Contrast   Final Result   No acute intracranial abnormality. Prominent senescent changes. These appear more prominent than usually seen   in a patient of this age, but could be due to old injuries, various   medications, or history of substance abuse.                ASSESSMENT / PLAN:  Hypernatremia  Resolved  Appreciate Nephrology  IVF  Trend labs  Dehydration  IVF  Trend labs-improving  Possibly due to oversedation, decreased oral intake, psychotropic medications including other medications such as lactulose and Jardiance  Type 2 diabetes  POCT before meals and at bedtime  Insulin sliding scale  Hypoglycemia protocol  Hold Jardiance  Major depressive disorder with psychotic features  Stop Geodon   Continue Cogentin, Depakote   Stop Trintellix,   Decrease Risperdal to 0.5 mg nightly  Decrease oral Haldol to 5 mg nightly and 2 mg in the morning and afternoon  Continue Ativan 0.5 mg to  nightly prn as needed  Stop Abilify  Bacteremia  Appreciate ID for ATB for DC   Stop Levaquin  Continue  Ancef through 11/13/2022  BC - Staph Aureus   Repeat BC - resolving  Thrush  Resolved  Completed IV Diflucan  Continue Peridox  Constipation  Resolved  Continue Colace, Senokot  Witnessed ALEXX  Decreased responsiveness  Decreased doses of Haldol, Ativan and Risperodol  Essential hypertension  Continue Lopressor  Nutrition status:   at risk for malnutrition  Dietician consult initiated  Hospital Prophylaxis:   DVT: Lovenox   Stress Ulcer: H2 Blocker   High risk medications: none   Disposition:    Discharge plan is - Return to [facility] when medically cleared  Continuing to look at other options  IV ATB through 11/13 Ancef or Rocephin will be fine      ROB Patton - CNP , ROB, NP-C  Hospitalist Medicine        10/26/2022, 10:06 AM

## 2022-10-26 NOTE — PROGRESS NOTES
Patient remains very restless in bed and intermittently tries to climb over the rail or tugs on the meeks. writer is rounding more frequently and telesitter remains in use. Call light and bedside table remain within reach. Will continue to monitor and assess.

## 2022-10-26 NOTE — PROGRESS NOTES
Writer called and informed Dr. Sury Briceño of patient's 577ml bladder scan and a meeks was reordered. Writer also informed the doctor of the new blood contained in patient's PICC dressing. Writer explained the infusion was stopped and there were no signs of infiltration or phlebitis. Doctor was not concerned with the new blood and writer was given the ok to restart the IV fluids. Writer and second nurse then inserted a 16F meeks catheter using sterile technique. 10ml balloon was inflated and there was 600mls of yellow malodorous urine returned. Patient tolerated very well. She was repositioned in bed for comfort. Telesitter remains in place. Patient is resting in bed with eyes closed at this time. Call light and bedside table remain within reach. Will continue to monitor and assess.

## 2022-10-26 NOTE — PROGRESS NOTES
One time dose of oral ativan given at this time. Patient has been combative and yelling and swearing at staff while she attempts to climb over the railing. Patient's PICC line has new blood under the dressing though patient did not pull on her IV line. Writer immediately stopped the infusing fluids and assessed the site. There was a brisk easy blood return on the IV and the dressing remains intact with no visible damage. Patient remains combative and 1:1 is continued. Writer will inform supervisor and on call physician.

## 2022-10-26 NOTE — PROGRESS NOTES
Patient oriented to person, disoriented to place, situation, and time. Patient asleep and withdrawn. Assessment and vital signs completed, see flowsheet. Patient FLACC pain scale 1. Lungs diminished throughout. Heart rate regular, murmur heard. Bowels active but distant in all 4 quadrants. REGIS last BM. Arnold catheter intact and draining. Trace edema in LLE and no edema in RLE. Patient resting in bed, call light within reach, denies further needs, will continue to monitor.

## 2022-10-26 NOTE — PROGRESS NOTES
Physical Therapy  Facility/Department: [facility] AT THE [facility] MED SURG  Daily Treatment Note  NAME: Ernesto Jay  : 1955  MRN: [id number]  Date of Service: 10/26/2022  Discharge Recommendations:  [address]   Patient Diagnosis(es): The primary encounter diagnosis was Hypernatremia. Diagnoses of Dehydration and Altered mental status, unspecified altered mental status type were also pertinent to this visit. Assessment   Assessment: PROM B LE's 2 x 15. Bed mobility:Total x2. Noted pt. does become alert with stimulus to arch of L foot but will not open eyes at this time. Activity Tolerance: Other (comment); Treatment limited secondary to medical complications;Treatment limited secondary to agitation   Plan    Physcial Therapy Plan  General Plan: 2 times a day 7 days a week  Current Treatment Recommendations: Strengthening;ROM;Balance training;Functional mobility training;Transfer training;ADL/Self-care training;Neuromuscular re-education;Gait training; Safety education & training;Patient/Caregiver education & training   Restrictions  Restrictions/Precautions  Restrictions/Precautions: General Precautions, Fall Risk  Required Braces or Orthoses?: No   Subjective    Subjective  Subjective: Patient in bed upon arrival with telesitter and nursing present. Pt. asleep and hard to arouse. Orientation  Overall Orientation Status: Impaired   Objective   Bed Mobility Training  Bed Mobility Training: Yes  Overall Level of Assistance: Assist X2;Total assistance  Interventions: Verbal cues; Other (comment)  Rolling: Total assistance;Assist X2  Scooting: Total assistance;Assist X2  Transfer Training  Transfer Training: No  Gait Training  Gait Training: No  PT Exercises  Exercise Treatment: PROM B LE's 2 x 15. Noted pt. responds to stimulus and massage to arch of L foot. Safety Devices  Type of Devices: Call light within reach; Left in bed;Bed alarm in place; Telesitter in use;Nurse notified   Goals  Short Term Goals  Time Frame for Short Term Goals: 3 DAYS  Short Term Goal 1: MOD ASSIST BED MOBILITY  Short Term Goal 2: MIN ASSIST TRANSFERS. Long Term Goals  Time Frame for Long Term Goals : 4 WEEKS  Long Term Goal 1: MIN ASSIST TRANSFERS AND MIN ASSIST BED MOBILITY. Patient Goals   Patient Goals : UNABLE TO ASCERTAIN DUE TO IMPAIRED COGNITION AND LETHARGY. Education  Patient Education  Education Given To: Patient  Education Provided: Transfer Training; Fall Prevention Strategies  Education Method: Verbal;Other (Comment)  Barriers to Learning: Cognition  Education Outcome: Continued education needed  Therapy Time   Individual Concurrent Group Co-treatment   Time In 8157         Time Out 0096         Minutes [address] [facility]

## 2022-10-26 NOTE — PROGRESS NOTES
Patient is alert and restless, trying to crawl over the railings. Writer attempted to reorient patient but was not successful. Writer called the supervisor to let her know patient is noncompliant and will not follow commands to stay in bed. Aid is sitting with patient 1:1. Patient is argumentative and confused and unable to be reoriented. 1:1 will be maintained for safety until patient is settled back now. Telesitter remains in place.

## 2022-10-26 NOTE — PROGRESS NOTES
Occupational Therapy  Facility/Department: [facility] AT THE [facility] MED SURG  Daily Treatment Note  NAME: Mitzy Orozco  : 1955  MRN: [id number]    Date of Service: 10/26/2022    Discharge Recommendations:  Continue to assess pending progress, Subacute/Skilled Nursing Facility         Patient Diagnosis(es): The primary encounter diagnosis was Hypernatremia. Diagnoses of Dehydration and Altered mental status, unspecified altered mental status type were also pertinent to this visit. Assessment    Assessment: Pt sleeping throughout therapy session. Activity Tolerance: Other (comment); Treatment limited secondary to medical complications;Treatment limited secondary to agitation  Discharge Recommendations: Continue to assess pending progress;Subacute/Skilled Nursing Facility      Plan   Occupational Therapy Plan  Times Per Week: 7x/week  Times Per Day: Once a day  Current Treatment Recommendations: Strengthening; Endurance training;Balance training;Patient/Caregiver education & training; Safety education & training;Self-Care / ADL     Restrictions   none    Subjective   Subjective  Subjective: Pt in bed with limited arousal. Pt with Hx of combatative behavior. Dimpling associated with Dupuytren's contracture noted in B palms   Pain: unable to assess  Orientation  Overall Orientation Status: Impaired           Objective    Vitals             OT Exercises  PROM Exercises: PROM completed by clinician to decrease likelihood of contractures. BUE x 3-5 x all joints shoulder, elbow and wrist and all digits and thumb. No indication of pain noted and pt able to open/close hands on command. Safety Devices  Type of Devices: All fall risk precautions in place; Bed alarm in place;Call light within reach; Left in bed;Nurse notified;Telesitter in use     Patient Education  Education Given To: Patient  Education Provided: Role of Therapy;Plan of Care  Education Provided Comments: PROM  Education Method: Verbal  Barriers to Learning: Cognition (lack of arousal)  Education Outcome: Unable to demonstrate understanding; Unable to verbalize    Goals  Short Term Goals  Time Frame for Short Term Goals: 10 visits  Short Term Goal 1: Pt will complete AROM/AAROM to BUE as tolerated to maintain joint range of motion for improved participation in ADL and functional transfers. Short Term Goal 2: Pt will complete functional transfers during ADL tasks modA to improve safety and participation in ADL tasks. Short Term Goal 3: Pt will participate in bathing tasks while seated in chair or upright in bed with moderate verbal prompting and modA.        Therapy Time   Individual Concurrent Group Co-treatment   Time In 1300         Time Out 1314         Minutes 14                 IRASEMA Gomez

## 2022-10-26 NOTE — PROGRESS NOTES
[facility]    Facility/Department: [facility] AT THE [facility] MED SURG    Speech Language Pathology    Clinical Bedside Swallow Evaluation    NAME:Bridget Rod    : 1955 (74 y.o.)    MRN: [id number]    ROOM: [id number]    ADMISSION DATE: 10/11/2022    PATIENT DIAGNOSIS(ES): Dehydration [E86.0]  Hypernatremia [E87.0]  Altered mental status, unspecified altered mental status type [R41.82]    Chief Complaint   Patient presents with    Altered Mental Status     Na+ 161 per [facility]       Patient Active Problem List    Diagnosis Date Noted    Mild malnutrition ([address] 10/25/2022    Hypokalemia 10/19/2022    ALEXX (obstructive sleep apnea)-witnessed 10/18/2022    MSSA (methicillin susceptible Staphylococcus aureus) septicemia ([address] 10/17/2022    Parotitis, acute 10/17/2022    SHAINA (acute kidney injury) ([address]     Alcoholic cirrhosis of liver with ascites ([address] 10/17/2022    Major depressive disorder 10/17/2022    Bacteremia due to Staphylococcus aureus 10/13/2022    Hypernatremia 10/11/2022    Dehydration 10/11/2022    Major depressive disorder with psychotic features ([address] 10/11/2022    Type 2 diabetes mellitus without complication ([address]     Essential hypertension 10/11/2022    ASHD (arteriosclerotic heart disease) 10/11/2022    Gastroesophageal reflux disease without esophagitis 10/11/2022    Macular pucker, left eye 2021       Past Medical History:   Diagnosis Date    Alcohol dependence in remission ([address]     Alcoholic cirrhosis of liver without ascites ([address]     ASHD (arteriosclerotic heart disease)     Bacteremia due to Staphylococcus aureus 10/13/2022    Depression     Diabetes mellitus (HCC)     GERD (gastroesophageal reflux disease)     Hepatitis C     WITH FULL RECOVERY - INFECTIOUS DISEASE DR. Geetha Bautista - LAST VISIT 2020    Hyperlipidemia     Hypertension     Irregular heartbeat     Murmur, cardiac     noted by PCP, no echo per patient    Urinary bladder incontinence Wears eyeglasses     Well adult health check     PCP - DR. Jeaneth Contreras - 3/2021    Well adult health check     INFECTIOUS DISEASE - DR. Lonnie Valdez    Well adult health check     GI - DR. Veronika Pablo       Past Surgical History:   Procedure Laterality Date    BREAST SURGERY Right 1975    REMOVAL BENIGN TUMOR    COLONOSCOPY      HIP SURGERY Right 1964    RECONSTRUCTION    TONSILLECTOMY      AS A CHILD    TUBAL LIGATION  1975    VITRECTOMY Left 04/22/2021    VITRECTOMY Left 4/22/2021    VITRECTOMY 25 GAUGE, MEMBRANE PEEL, ICG performed by Elías Pickett MD at [address]   Allergen Reactions    Lisinopril Other (See Comments)     COUGH       DATE ONSET: 10/11/22    Date of Evaluation: 10/26/2022    Evaluating Therapist: JEZ Garza    Dysphagia Diagnosis    Dysphagia Diagnosis: Suspected needs further assessment; Moderate oral stage dysphagia    Recommended Diet         Diet Solids Recommendation: Pureed    Liquid Consistency Recommendation: Mildly Thick (Nectar)         Compensatory Swallowing Strategies : Eat/Feed slowly;Upright as possible for all oral intake;Remain upright for 30-45 minutes after meals;Assist feed; Alternate solids and liquids;Small bites/sips; Other (comments)    Reason for Referral    Rani Fenton was referred for a bedside swallow evaluation to assess the efficiency of her swallow function, identify signs and symptoms of aspiration, identify risk factors, and make recommendations regarding safe dietary consistencies, effective compensatory strategies, and safe eating environment. Prior Dysphagia History  Prior Dysphagia History: Unknown dysphagia history. Patient is currently on a puree and mildly-thick liquids. Patient Complaint  Patient Complaint: Patient has been taking minimal PO intake due to lethargy. General    Chart Reviewed: Yes  Behavior/Cognition: Confused; Lethargic;Uncooperative  Respiratory Status: Room air  O2 Device: None (Room air)  Follows Directions: Simple  Dentition: Poor dental/oral hygeine  Patient Positioning: Upright in chair;Upright in bed  Baseline Vocal Quality: Normal  Prior Dysphagia History: Unknown dysphagia history. Patient is currently on a puree and mildly-thick liquids. Consistencies Administered: Mildly Thick- teaspoon;Pureed;Pills    Vision and Hearing    Vision  Vision: Within Functional Limits  Hearing  Hearing: Within functional limits    Current Diet level    Current Diet : Pureed    Oral Motor    Labial: Decreased rate;Decreased seal  Dentition: Full  Oral Hygiene: Dried secretions; Xerostomia  Lingual: Decreased strength; Incoordinated  Mandible: Restricted    Oral/Pharyngeal Phase    Oral Phase - Comment: Patient continues to present with mild oral phase dysphagia. Patient demonstrated poor labial seal despite maximal verbal, visual, and tactile cueing to remove bolus from spoon. Patient required spoon turned upside down to remove bolus. Patient demonstrated some munching mastication, but continued to have poor bolus propulsion with tongue. Anterior spillage noted, with patient noted to have sensation of chin as patient attempted to wipe. Pharyngeal Phase: Patient demonstrated delayed swallow initiation of 3-4 seconds with each trial. Patient demonstrated no overt s/sx of aspiration penetration with any consitency this date. PO Trials  Assessment Method(s): Observation  Vocal Quality: No Impairment  Consistency Presented: Mildly Thick  How Presented: SLP-fed/Presented  Bolus Acceptance: Impaired  Bolus Formation/Control: Impaired  Type of Impairment: Lip closure;Spillage  Propulsion: Discoordination  Oral Residue: Less than 10% of bolus  Initiation of Swallow: Delayed (# of seconds) (3-4 seconds)  Laryngeal Elevation: Functional  Aspiration Signs/Symptoms: None                        Dysphagia Diagnosis    Dysphagia Diagnosis: Suspected needs further assessment; Moderate oral stage dysphagia    Dysphagia Outcome Severity Scale: Level 3: Moderate dysphagia- Total assisstance, supervision or strategies. Two or more diet consistencies restricted    Recommendations    Requires SLP Intervention: Yes  Diet Solids Recommendation: Pureed  Liquid Consistency Recommendation: Mildly Thick (Nectar)  Compensatory Swallowing Strategies : Eat/Feed slowly;Upright as possible for all oral intake;Remain upright for 30-45 minutes after meals;Assist feed; Alternate solids and liquids;Small bites/sips; Other (comments)  Frequency of Treatment: Daily during inpatient stay    Prognosis    Prognosis: [address]    Education    Individuals consulted  Consulted and agree with results and recommendations: Patient;RN  RN Name: Smooth Rodney         Treatment/Goals    Short-term Goals  Timeframe for Short-term Goals: 4 days  Goal 1: Patient will trial thin liquids without overt s/sx of aspiration/penetration in 80% of opportunities. Goal 2: Patient will trial minced and moist solids with adequate bolus prepartion/propulsion and no oral residues in 80% of opportunities. Long-term Goals  Timeframe for Long-term Goals: 7 days  Goal 1: Patient will tolerate safest, least restrictive diet without overt s/sx of aspiration/penetration in 90% of opportunities. Safety Devices         Pain Assessment    Pain Assessment: Patient does not c/o pain; Patient does not appear in pain    Pain Re-assessment    Pain Reassessment: Patient does not appear in pain    Therapy Time    SLP Individual Minutes  Time In: 5700  Time Out: 1753  Minutes: 23     Patient seen in room for dysphagia therapy. RNs were attempting to give patient medications crushed in puree. Patient required maximal stimulation and PROM from PT to become alert enough for PO intake. Patient at times made appropriate comments and responding to directions. Patient continues to present with mild oral phase dysphagia.  Patient demonstrated poor labial seal despite maximal verbal, visual, and tactile cueing to remove bolus from spoon. Patient consumed 7 trials of nectar-thick liquids via spoon and 6 trials puree berries. Patient required spoon turned upside down to remove bolus. Patient demonstrated some munching mastication, but continued to have poor bolus propulsion with tongue. Anterior spillage noted, with patient noted to have sensation of chin as patient attempted to wipe. Pharyngeal Phase: Patient demonstrated delayed swallow initiation of 3-4 seconds with each trial. Patient demonstrated no overt s/sx of aspiration penetration with any consitency this date. Unable to complete advanced PO trials due to continued lethargy and decreased safety. trials. ST recommends continued diet of puree solids and nectar-thickened liquids when patient is alert. ST will continue to follow during inpatient stay.         Electronically signed: Agus Mayorga             10/26/2022 pk

## 2022-10-26 NOTE — PROGRESS NOTES
Patient oriented to person, disoriented to place, time, and situation, withdrawn and sleeping. Assessment and vital signs completed, see flowsheet. Eye opening response to pain then voice. Patient FLACC pain score 1. Max assist with gait belt up to chair - pt tolerated fairly well. Diminished lung sounds throughout. Trace edema in LLE only. Arnold catheter intact and draining. Patient resting in chair, call light within reach, denies further needs, will continue to monitor.

## 2022-10-26 NOTE — PROGRESS NOTES
Spoke with [facility] admissions representative in [facility] this a.m. They do not contract with Patient insurance but are willing to review Patient case and attempt to admit to their facility under Patient Medicaid insurance if appropriate. FAXED requested information to facility this a.m. Will await their review and assist with discharge placement details as appropriate.     Junior. Carolyne Farah 71, [facility] [place]  10/26/2022

## 2022-10-26 NOTE — PROGRESS NOTES
Patient resting comfortably at this time with eyes closed. Patient wakes up intermittently and throws legs over the side of the bed. Patient unable to answer questions appropriately or follow simple commands at this time. Assessment and vitals as charted. Bed locked, refused gripper socks and takes them off, call light within reach and sitter at bedside. Will continue to monitor this shift.

## 2022-10-26 NOTE — PROGRESS NOTES
[address]  Inpatient/Observation/Outpatient Rehabilitation    Date: 10/26/2022  Patient Name: Lu Ley       [x] Inpatient Acute/Observation       []  Outpatient  : 1955       [] Pt no showed for scheduled appointment    [x] Pt refused/declined therapy at this time due to:  Pt inability to remain aroused for tx, pt became agitated with PTA previously. [] Pt cancelled due to:  [] No Reason Given   [] Sick/ill   [] Other:    Therapist/Assistant will attempt to see this patient, at our earliest opportunity.        IRASEMA Broussard Date: 10/26/2022

## 2022-10-26 NOTE — PROGRESS NOTES
[facility] - Progress Note  - Telemedicine  Today's Date and Time: 10/26/2022, 9:46 AM    Impression :   MSSA septicemia 10-11-22 x 2  Lt parotitis from dehydration and MSSA  Dehydration  Hypernatremia-resolved  Altered mental status  SHAINA- Resolved  Cirrhosis of the liver  Major depression with psychotic features  Oral candidiasis    Patient evaluated by Telemedicine. Requesting Institution: [facility]  Provider Institution: [address] [place] [address],[address] at [facility]: Ms Haja Mcnamara, APRN- IM    Recommendations:     Continue cefazolin 2 gm IV q 8 hr. Stop date 11-13-22  Alternatively she could receive Ceftriaxone 2 gm IV q 24 hr until 11-13-22, if the facility where she is going can not accommodate IV infusions 3 times daily. Repeat blood cultures x 2 on 11-18-22 to document clearing of septicemia  Oral nystatin  Medical Decision Making/Summary/Discussion:10/26/2022       Infection Control Recommendations   [place] Precautions    Antimicrobial Stewardship Recommendations     Simplification of therapy  Targeted therapy    Coordination of Outpatient Care:   Estimated Length of IV antimicrobials:11-13-22  Patient will need Midline Catheter Insertion: yes  Patient will need PICC line Insertion:no  Patient will need: Home IV , [place],  SNF,  LTAC:  Yes  Patient will need outpatient wound care:No    Chief complaint/reason for consultation:   MSSA septicemia      History of Present Illness:   Noman Johnson is a 74y.o.-year-old  female who was initially admitted on 10/11/2022. Patient seen at the request of     INITIAL HISTORY:     Patient was transferred to [facility] fro [place] on 10-11-22 because of altered mental status. She was found to be severely hypernatremic with a Na of 161, dehydrated and in SHAINA. She has received hydration with improvement. Has been evaluated by Nephrology.      She has a past Hx of ETOH intake, cirrhosis of the liver with ascites, CAD, essential HTN, DM 2, GERD, bladder incontinence. ID service asked to evaluate because of S aureus septicemia on 10-13-22. CURRENT EVALUATION :10/26/2022  BP (!) 142/74   Pulse 57   Temp 97 °F (36.1 °C) (Temporal)   Resp 16   Ht 5' 6\" (1.676 m)   Wt 147 lb (66.7 kg)   SpO2 96%   BMI 23.73 kg/m²     Afebrile  VS stable    Patient feels better  No new issues per RN  Patient has been confused and combative with staff    There is no growth on repeat blood cultures from 10/18/22  Midline placed for outpatient antibiotics    Discharge planning underway back to SNF  Difficulty finding a SNF that will accept the patient. Labs, X rays reviewed: 10/26/2022    BUN: 4  Cr:0.47    WBC: 8.3-->7.5  Hb:12.4  Plat: 80-->97    Cultures:  Urine:    Blood:  10-11-22: MSSA x 2  10-18-22: No growth   Sputum :    Wound:      Discussed with VICK, RN. I have personally reviewed the past medical history, past surgical history, medications, social history, and family history, and I have updated the database accordingly. Past Medical History:     Past Medical History:   Diagnosis Date    Alcohol dependence in remission [facility])     Alcoholic cirrhosis of liver without ascites (HCC)     ASHD (arteriosclerotic heart disease)     Bacteremia due to Staphylococcus aureus 10/13/2022    Depression     Diabetes mellitus (HCC)     GERD (gastroesophageal reflux disease)     Hepatitis C     WITH FULL RECOVERY - INFECTIOUS DISEASE DR. Sammi Conti - LAST VISIT 7/2020    Hyperlipidemia     Hypertension     Irregular heartbeat     Murmur, cardiac     noted by PCP, no echo per patient    Urinary bladder incontinence     Wears eyeglasses     Well adult health check     PCP - DR. Jamila Olivarez - 3/2021    Well adult health check     INFECTIOUS DISEASE - DR. Christi Kee    Well adult health check     GI - DR. Robb Ordonez       Past Surgical  History: Past Surgical History:   Procedure Laterality Date    BREAST SURGERY Right     REMOVAL BENIGN TUMOR    COLONOSCOPY      HIP SURGERY Right 1964    RECONSTRUCTION    TONSILLECTOMY      AS A CHILD    TUBAL LIGATION  1975    VITRECTOMY Left 2021    VITRECTOMY Left 2021    VITRECTOMY 25 GAUGE, MEMBRANE PEEL, ICG performed by Alvester Primrose, MD at [facility] OR       Medications:      haloperidol  5 mg Oral Nightly    haloperidol  2 mg Oral BID    risperiDONE  0.5 mg Oral Nightly    tamsulosin  0.4 mg Oral Daily    lidocaine 1 % injection  5 mL IntraDERmal Once    sodium chloride flush  5-40 mL IntraVENous 2 times per day    docusate sodium  100 mg Oral BID    senna  1 tablet Oral Nightly    chlorhexidine  15 mL Mouth/Throat BID    nystatin  5 mL Oral 4x Daily    ceFAZolin  2,000 mg IntraVENous Q8H    insulin lispro  0-8 Units SubCUTAneous TID WC    insulin lispro  0-4 Units SubCUTAneous Nightly    aspirin  81 mg Oral Daily    benztropine  1 mg Oral BID    divalproex  125 mg Oral TID    folic acid  1 mg Oral Daily    metoprolol tartrate  50 mg Oral BID    pantoprazole  40 mg Oral QAM AC    potassium chloride  10 mEq Oral Daily    pravastatin  40 mg Oral Daily    vitamin B-1  100 mg Oral Daily    sodium chloride flush  5-40 mL IntraVENous 2 times per day       Social History:     Social History     Socioeconomic History    Marital status: Single     Spouse name: Not on file    Number of children: Not on file    Years of education: Not on file    Highest education level: Not on file   Occupational History    Not on file   Tobacco Use    Smoking status: Former     Types: Cigarettes     Quit date:      Years since quittin.8    Smokeless tobacco: Never   Vaping Use    Vaping Use: Never used   Substance and Sexual Activity    Alcohol use: Not on file    Drug use: Not on file    Sexual activity: Not on file   Other Topics Concern    Not on file   Social History Narrative    Not on file     Social Determinants of Health     Financial Resource Strain: Not on file   Food Insecurity: Not on file   Transportation Needs: Not on file   Physical Activity: Not on file   Stress: Not on file   Social Connections: Not on file   Intimate Partner Violence: Not on file   Housing Stability: Not on file       Family History:   History reviewed. No pertinent family history. Allergies:   Lisinopril     Review of Systems:     Patient unable to provide ROS. Confused and agitated. 10/26/2022        Constitutional: No fevers or chills. No systemic complaints  Head: No headaches  Eyes: No double vision or blurry vision. No conjunctival inflammation. ENT: No sore throat or runny nose. . No hearing loss, tinnitus or vertigo. Cardiovascular: No chest pain or palpitations. No shortness of breath. No TESFAYE  Lung: No shortness of breath or cough. No sputum production  Abdomen: No nausea, vomiting, diarrhea, or abdominal pain. Dominic Negron No cramps. Genitourinary: No increased urinary frequency, or dysuria. No hematuria. No suprapubic or CVA pain  Musculoskeletal: No muscle aches or pains. No joint effusions, swelling or deformities  Hematologic: No bleeding or bruising. Neurologic: No headache, weakness, numbness, or tingling. Integument: No rash, no ulcers. Psychiatric: No depression. Endocrine: No polyuria, no polydipsia, no polyphagia. Physical Examination :   Patient Vitals for the past 8 hrs:   BP Temp Temp src Pulse Resp SpO2 Weight   10/26/22 0930 (!) 142/74 97 °F (36.1 °C) Temporal 57 16 96 % --   10/26/22 0345 -- -- -- -- -- -- 147 lb (66.7 kg)   10/26/22 0230 (!) 142/71 97.9 °F (36.6 °C) Temporal 58 15 98 % --     General Appearance: Awake, mentation altered and in no apparent distress  Head:  Normocephalic, no trauma  Eyes: Pupils equal, round, reactive to light and accommodation; extraocular movements intact; sclera anicteric; conjunctivae pink. No embolic phenomena.   ENT: Oropharynx clear, without erythema, exudate, or thrush. No tenderness of sinuses. Mouth/throat: mucosa pink and moist. No lesions. Lt side parotitis   Neck:Supple, without lymphadenopathy. Thyroid normal, No bruits. Pulmonary/Chest: Clear to auscultation, without wheezes, rales, or rhonchi. No dullness to percussion. Cardiovascular: Regular rate and rhythm without murmurs, rubs, or gallops. Abdomen: Soft, non tender. Bowel sounds normal. No organomegaly. Ascites. All four Extremities: No cyanosis, clubbing, edema, or effusions. Neurologic: No gross sensory or motor deficits. Confused and agitated . Has tremors  Skin: Warm and dry with good turgor. No signs of peripheral arterial or venous insufficiency. No ulcerations. No open wounds. Medical Decision Making -Laboratory:   I have independently reviewed/ordered the following labs:    CBC with Differential:   No results for input(s): WBC, HGB, HCT, PLT, SEGSPCT, BANDSPCT, LYMPHOPCT, MONOPCT, EOSPCT in the last 72 hours. BMP:   Recent Labs     10/24/22  0520 10/26/22  0538    140   K 3.7 3.5*    106   CO2 26 26   BUN 5* 4*   CREATININE 0.54 0.47*   MG  --  1.7     Hepatic Function Panel:   No results for input(s): PROT, LABALBU, BILIDIR, IBILI, BILITOT, ALKPHOS, ALT, AST in the last 72 hours. No results for input(s): RPR in the last 72 hours. No results for input(s): HIV in the last 72 hours. No results for input(s): BC in the last 72 hours. Lab Results   Component Value Date/Time    MUCUS TRACE 10/11/2022 07:00 PM    RBC 4.23 10/18/2022 06:20 AM    WBC 7.5 10/18/2022 06:20 AM    TURBIDITY Clear 10/11/2022 07:00 PM     Lab Results   Component Value Date/Time    CREATININE 0.47 10/26/2022 05:38 AM    GLUCOSE 106 10/26/2022 05:38 AM       Medical Decision Making-Imaging:     EXAMINATION:   ONE XRAY VIEW OF THE CHEST       10/11/2022 10:30 am       COMPARISON:   None.        HISTORY:   ORDERING SYSTEM PROVIDED HISTORY: altered mental status   TECHNOLOGIST PROVIDED HISTORY:   altered mental status       FINDINGS:   The heart is not enlarged. No pulmonary venous congestion or edema. No   convincing lung consolidation or infiltrate. No pleural effusion or   pneumothorax. Osseous structures grossly intact. Impression   No acute cardiopulmonary process     Medical Decision Lyktmu-Rffoielg-Ffbsm:       Medical Decision Making-Other:     Note:  Labs, medications, radiologic studies were reviewed with personal review of films  Large amounts of data were reviewed  Discussed with nursing Staff, Discharge planner  Infection Control and Prevention measures reviewed  All prior entries were reviewed  Administer medications as ordered  Prognosis: Guarded  Discharge planning reviewed  Follow up as outpatient. Thank you for allowing us to participate in the care of this patient. Please call with questions. ROB Stinson - CNP    ATTESTATION:    I have discussed the case, including pertinent history and exam findings with the APRN. I have evaluated the  History, physical findings and pictures of the patient and the key elements of the encounter have been performed by me. I have reviewed the laboratory data, other diagnostic studies and discussed them with the APRN. I have updated the medical record where necessary. I agree with the assessment, plan and orders as documented by the APRN.     Cassandra Lacy MD.        Pager: [phone] - Office: [phone]

## 2022-10-27 LAB
GLUCOSE BLD-MCNC: 146 MG/DL (ref 74–100)
GLUCOSE BLD-MCNC: 173 MG/DL (ref 74–100)
GLUCOSE BLD-MCNC: 90 MG/DL (ref 74–100)
GLUCOSE BLD-MCNC: 96 MG/DL (ref 74–100)

## 2022-10-27 PROCEDURE — 6370000000 HC RX 637 (ALT 250 FOR IP): Performed by: FAMILY MEDICINE

## 2022-10-27 PROCEDURE — 82947 ASSAY GLUCOSE BLOOD QUANT: CPT

## 2022-10-27 PROCEDURE — APPSS30 APP SPLIT SHARED TIME 16-30 MINUTES: Performed by: NURSE PRACTITIONER

## 2022-10-27 PROCEDURE — 97110 THERAPEUTIC EXERCISES: CPT

## 2022-10-27 PROCEDURE — 1200000000 HC SEMI PRIVATE

## 2022-10-27 PROCEDURE — 6360000002 HC RX W HCPCS: Performed by: NURSE PRACTITIONER

## 2022-10-27 PROCEDURE — 6370000000 HC RX 637 (ALT 250 FOR IP): Performed by: NURSE PRACTITIONER

## 2022-10-27 PROCEDURE — 2580000003 HC RX 258: Performed by: NURSE PRACTITIONER

## 2022-10-27 PROCEDURE — 6360000002 HC RX W HCPCS: Performed by: FAMILY MEDICINE

## 2022-10-27 PROCEDURE — 97530 THERAPEUTIC ACTIVITIES: CPT

## 2022-10-27 PROCEDURE — 92526 ORAL FUNCTION THERAPY: CPT

## 2022-10-27 PROCEDURE — 99232 SBSQ HOSP IP/OBS MODERATE 35: CPT | Performed by: INTERNAL MEDICINE

## 2022-10-27 PROCEDURE — 94761 N-INVAS EAR/PLS OXIMETRY MLT: CPT

## 2022-10-27 RX ORDER — BISACODYL 10 MG
10 SUPPOSITORY, RECTAL RECTAL DAILY PRN
Status: DISCONTINUED | OUTPATIENT
Start: 2022-10-27 | End: 2022-10-28 | Stop reason: HOSPADM

## 2022-10-27 RX ORDER — HALOPERIDOL 5 MG/ML
2 INJECTION INTRAMUSCULAR ONCE
Status: COMPLETED | OUTPATIENT
Start: 2022-10-27 | End: 2022-10-27

## 2022-10-27 RX ADMIN — SENNOSIDES 8.6 MG: 8.6 TABLET, FILM COATED ORAL at 19:58

## 2022-10-27 RX ADMIN — CEFAZOLIN 2000 MG: 2 INJECTION, POWDER, FOR SOLUTION INTRAMUSCULAR; INTRAVENOUS at 03:55

## 2022-10-27 RX ADMIN — CHLORHEXIDINE GLUCONATE 0.12% ORAL RINSE 15 ML: 1.2 LIQUID ORAL at 19:58

## 2022-10-27 RX ADMIN — PRAVASTATIN SODIUM 40 MG: 20 TABLET ORAL at 09:00

## 2022-10-27 RX ADMIN — DIVALPROEX SODIUM 125 MG: 125 CAPSULE ORAL at 08:59

## 2022-10-27 RX ADMIN — CEFAZOLIN 2000 MG: 2 INJECTION, POWDER, FOR SOLUTION INTRAMUSCULAR; INTRAVENOUS at 12:50

## 2022-10-27 RX ADMIN — METOPROLOL TARTRATE 50 MG: 50 TABLET, FILM COATED ORAL at 19:59

## 2022-10-27 RX ADMIN — HALOPERIDOL 2 MG: 2 TABLET ORAL at 12:47

## 2022-10-27 RX ADMIN — BENZTROPINE MESYLATE 1 MG: 1 TABLET ORAL at 09:00

## 2022-10-27 RX ADMIN — NYSTATIN 500000 UNITS: 100000 SUSPENSION ORAL at 16:54

## 2022-10-27 RX ADMIN — RISPERIDONE 0.5 MG: 0.25 TABLET ORAL at 19:58

## 2022-10-27 RX ADMIN — DIVALPROEX SODIUM 125 MG: 125 CAPSULE ORAL at 19:59

## 2022-10-27 RX ADMIN — CEFAZOLIN 2000 MG: 2 INJECTION, POWDER, FOR SOLUTION INTRAMUSCULAR; INTRAVENOUS at 19:58

## 2022-10-27 RX ADMIN — METOPROLOL TARTRATE 50 MG: 50 TABLET, FILM COATED ORAL at 09:01

## 2022-10-27 RX ADMIN — NYSTATIN 500000 UNITS: 100000 SUSPENSION ORAL at 12:47

## 2022-10-27 RX ADMIN — DIVALPROEX SODIUM 125 MG: 125 CAPSULE ORAL at 14:56

## 2022-10-27 RX ADMIN — HALOPERIDOL 2 MG: 2 TABLET ORAL at 09:00

## 2022-10-27 RX ADMIN — LORAZEPAM 0.5 MG: 0.5 TABLET ORAL at 19:58

## 2022-10-27 RX ADMIN — BENZTROPINE MESYLATE 1 MG: 1 TABLET ORAL at 19:58

## 2022-10-27 RX ADMIN — DOCUSATE SODIUM 100 MG: 100 CAPSULE, LIQUID FILLED ORAL at 19:59

## 2022-10-27 RX ADMIN — CHLORHEXIDINE GLUCONATE 0.12% ORAL RINSE 15 ML: 1.2 LIQUID ORAL at 09:00

## 2022-10-27 RX ADMIN — HALOPERIDOL LACTATE 2 MG: 5 INJECTION, SOLUTION INTRAMUSCULAR at 21:23

## 2022-10-27 RX ADMIN — BISACODYL 10 MG: 10 SUPPOSITORY RECTAL at 15:19

## 2022-10-27 RX ADMIN — SODIUM CHLORIDE: 9 INJECTION, SOLUTION INTRAVENOUS at 21:25

## 2022-10-27 RX ADMIN — NYSTATIN 500000 UNITS: 100000 SUSPENSION ORAL at 19:58

## 2022-10-27 RX ADMIN — NYSTATIN 500000 UNITS: 100000 SUSPENSION ORAL at 09:00

## 2022-10-27 RX ADMIN — SODIUM CHLORIDE: 9 INJECTION, SOLUTION INTRAVENOUS at 06:48

## 2022-10-27 ASSESSMENT — PAIN SCALES - GENERAL: PAINLEVEL_OUTOF10: 0

## 2022-10-27 NOTE — PROGRESS NOTES
Patient extremely agitated. Attempting to hit and pull at staff. Patient attempting to get out of bed. Patient refuses to take any mediations. VS and assessment completed.  Bed alarm on

## 2022-10-27 NOTE — FLOWSHEET NOTE
Patient was at the side of the bed, tele sitter called writer and writer notified supervisor and is now at bedside. Writer assisted patient up to the chair with two assist and patient is content.

## 2022-10-27 NOTE — PROGRESS NOTES
Writer present in room. Patient was awake and restless in bed upon arrival to room. Writer and additional staff completed jose-care and meeks care d/t incontinent large stool. Patient up to chair with x2 assist. Patient sitting in chair restless with eyes closed. Writer attempted to engage patient in coloring, drawing or watching TV with no success. Call light and bedside table within reach, chair alarm active for safety. Will continue to monitor.

## 2022-10-27 NOTE — PROGRESS NOTES
Progress Note    SUBJECTIVE:    Patient seen for f/u of Hypernatremia. She resting in bed eyes closed  easy resp. No distress. Continue to encourage oral intake    ROS: Unable due to altered mental status     All other systems were reviewed with the patient and are negative unless otherwise stated in HPI      OBJECTIVE:      Vitals:   Vitals:    10/27/22 0641   BP: 109/67   Pulse: 63   Resp: 18   Temp: (!) 96.3 °F (35.7 °C)   SpO2: 98%     Weight: 148 lb (67.1 kg)   Height: 5' 6\" (167.6 cm)     Weight  Wt Readings from Last 3 Encounters:   10/27/22 148 lb (67.1 kg)   04/22/21 180 lb (81.6 kg)     Body mass index is 23.89 kg/m². 24HR INTAKE/OUTPUT:      Intake/Output Summary (Last 24 hours) at 10/27/2022 0719  Last data filed at 10/27/2022 0547  Gross per 24 hour   Intake 3091 ml   Output 1400 ml   Net 1691 ml     -----------------------------------------------------------------  Exam:    GEN:   eyes closed easy resp. No distress Stimulation given to awaken and encourage oral intake  EYES:   EOMI, pupils equal   NECK: Supple. No lymphadenopathy. No carotid bruit  CVS:     regular rate and rhythm, no audible murmur  PULM:  diminished but CTA, no wheezes, rales or rhonchi, no acute respiratory distress  ABD:     Bowels sounds normal.  Abdomen is soft. No distention. no tenderness to palpation. EXT:     no edema bilaterally . No calf tenderness. NEURO: Moves all extremities. Motor and sensory are grossly intact. SKIN:    No rashes.   No skin lesions    -----------------------------------------------------------------    Diagnostic Data:        Last 3 Blood Glucose:   Recent Labs     10/26/22  0538   GLUCOSE 106*        Comprehensive Metabolic Profile:   Recent Labs     10/26/22  0538      K 3.5*      CO2 26   BUN 4*   CREATININE 0.47*   GLUCOSE 106*   CALCIUM 8.4*        Urinalysis:   Lab Results   Component Value Date/Time    NITRU NEGATIVE 10/11/2022 07:00 PM    COLORU Yellow 10/11/2022 07:00 PM    PHUR 6.0 10/11/2022 07:00 PM    WBCUA 2 TO 5 10/11/2022 07:00 PM    RBCUA 2 TO 5 10/11/2022 07:00 PM    MUCUS TRACE 10/11/2022 07:00 PM    BACTERIA 1+ 10/11/2022 07:00 PM    SPECGRAV 1.020 10/11/2022 07:00 PM    LEUKOCYTESUR NEGATIVE 10/11/2022 07:00 PM    UROBILINOGEN ELEVATED 10/11/2022 07:00 PM    BILIRUBINUR NEGATIVE 10/11/2022 07:00 PM    GLUCOSEU 3+ 10/11/2022 07:00 PM    KETUA NEGATIVE 10/11/2022 07:00 PM       HgBA1c:    Lab Results   Component Value Date/Time    LABA1C 6.8 10/11/2022 09:35 AM       Radiology/Imaging:  XR CHEST (2 VW)   Final Result   No acute process. XR CHEST PORTABLE   Final Result   No acute cardiopulmonary process         CT Head W/O Contrast   Final Result   No acute intracranial abnormality. Prominent senescent changes. These appear more prominent than usually seen   in a patient of this age, but could be due to old injuries, various   medications, or history of substance abuse.                ASSESSMENT / PLAN:  Hypernatremia  Resolved  Appreciate Nephrology  IVF  Trend labs  Dehydration  IVF  Trend labs-improving  Possibly due to oversedation, decreased oral intake, psychotropic medications including other medications such as lactulose and Jardiance  Type 2 diabetes  POCT before meals and at bedtime  Insulin sliding scale  Hypoglycemia protocol  Hold Jardiance  Major depressive disorder with psychotic features  Stop Geodon   Continue Cogentin, Depakote   Stop Trintellix,   Continue Risperdal to 0.5 mg nightly  Continue oral Haldol to 5 mg nightly and 2 mg in the morning and afternoon  Continue Ativan 0.5 mg to  nightly prn as needed  Stop Abilify  Bacteremia  Appreciate ID for ATB for DC   Stop Levaquin  Continue  Ancef through 11/13/2022  BC - Staph Aureus   Repeat BC - resolving  Thrush  Resolved  Completed IV Diflucan  Continue Peridox  Constipation  Resolved  Continue Colace, Senokot  Witnessed ALEXX  Decreased responsiveness  Decreased doses of Haldol, Ativan and Risperodol  Essential hypertension  Continue Lopressor  Nutrition status:   at risk for malnutrition  Dietician consult initiated  Hospital Prophylaxis:   DVT: Lovenox   Stress Ulcer: H2 Blocker   High risk medications: none   Disposition:    Discharge plan is - Return to [address] [place] [address] when medically cleared  Continuing to look at other options  IV ATB through 11/13 Ancef or Rocephin will be fine      ROB Brewer - CNP , ROB, NP-C  Hospitalist Medicine        10/27/2022, 7:19 AM

## 2022-10-27 NOTE — PROGRESS NOTES
ROJELIO spoke with Tom Lopez at [facility] and she received all fax documents and has started authorization. Toan Correa MSW LSW 10/27/2022     [facility] but will have to try to get a one time contract with [facility]. ROJELIO called back to [facility] and informed that Maximiliano had started an authorization today. Lola will hold onto pt information in case this would fall through and can be contacted if their facility is needed.  Toan Correa MSW LSW 10/27/2022

## 2022-10-27 NOTE — PROGRESS NOTES
Physical Therapy  Facility/Department: [facility] MED SURG  Daily Treatment Note  NAME: Marino Segura  : 1955  MRN: [id number]    Date of Service: 10/27/2022    Discharge Recommendations:  [address]        Patient Diagnosis(es): The primary encounter diagnosis was Hypernatremia. Diagnoses of Dehydration and Altered mental status, unspecified altered mental status type were also pertinent to this visit. Assessment   Assessment: Bed mobiltiy MaxAx2/dependent, trasnfers MaxAx2 PEOM BLE x15     Plan    Physcial Therapy Plan  General Plan: 2 times a day 7 days a week  Current Treatment Recommendations: Strengthening;ROM;Balance training;Functional mobility training;Transfer training;ADL/Self-care training;Neuromuscular re-education;Gait training; Safety education & training;Patient/Caregiver education & training     Restrictions  Restrictions/Precautions  Restrictions/Precautions: General Precautions, Fall Risk  Required Braces or Orthoses?: No     Subjective    Subjective  Subjective: Pt in bed on arrival,  Orientation  Overall Orientation Status: Impaired  Orientation Level: Unable to assess     Objective   Vitals     Bed Mobility Training  Bed Mobility Training: Yes  Overall Level of Assistance: Assist X2;Maximum assistance; Total assistance  Interventions: Tactile cues  Rolling: Assist X2;Maximum assistance; Total assistance  Supine to Sit: Maximum assistance;Assist X2;Total assistance  Transfer Training  Transfer Training: Yes  Overall Level of Assistance: Maximum assistance;Assist X2  Interventions: Verbal cues;Manual cues; Safety awareness training  Sit to Stand: Maximum assistance;Assist X2  Stand to Sit: Maximum assistance;Assist X2  Gait Training  Gait Training: No     PT Exercises  PROM Exercises: PROM seated in recliner x15             Goals  Short Term Goals  Time Frame for Short Term Goals: 3 DAYS  Short Term Goal 1: MOD ASSIST BED MOBILITY  Short Term Goal 2: MIN ASSIST TRANSFERS. Long Term Goals  Time Frame for Long Term Goals : 4 WEEKS  Long Term Goal 1: MIN ASSIST TRANSFERS AND MIN ASSIST BED MOBILITY. Patient Goals   Patient Goals : UNABLE TO ASCERTAIN DUE TO IMPAIRED COGNITION AND LETHARGY.     Education  Patient Education  Education Given To: Patient  Education Provided: Transfer Training  Barriers to Learning: Cognition    Therapy Time   Individual Concurrent Group Co-treatment   Time In 96 [phone] 26         Time Out 0920         Minutes [address] [place] [address]

## 2022-10-27 NOTE — PROGRESS NOTES
[facility]    Facility/Department: [facility] MED SURG    Speech Language Pathology    Clinical Bedside Swallow Evaluation    NAME:Bridget Castrejon    : 1955 (74 y.o.)    MRN: [id number]    ROOM: [facility]/0329-    ADMISSION DATE: 10/11/2022    PATIENT DIAGNOSIS(ES): Dehydration [E86.0]  Hypernatremia [E87.0]  Altered mental status, unspecified altered mental status type [R41.82]    Chief Complaint   Patient presents with    Altered Mental Status     Na+ 161 per [facility]       Patient Active Problem List    Diagnosis Date Noted    Mild malnutrition ([address] 10/25/2022    Hypokalemia 10/19/2022    ALEXX (obstructive sleep apnea)-witnessed 10/18/2022    MSSA (methicillin susceptible Staphylococcus aureus) septicemia ([address] 10/17/2022    Parotitis, acute 10/17/2022    SHAINA (acute kidney injury) ([address]     Alcoholic cirrhosis of liver with ascites ([address] 10/17/2022    Major depressive disorder 10/17/2022    Bacteremia due to Staphylococcus aureus 10/13/2022    Hypernatremia 10/11/2022    Dehydration 10/11/2022    Major depressive disorder with psychotic features ([address] 10/11/2022    Type 2 diabetes mellitus without complication ([address]     Essential hypertension 10/11/2022    ASHD (arteriosclerotic heart disease) 10/11/2022    Gastroesophageal reflux disease without esophagitis 10/11/2022    Macular pucker, left eye 2021       Past Medical History:   Diagnosis Date    Alcohol dependence in remission ([address]     Alcoholic cirrhosis of liver without ascites ([address]     ASHD (arteriosclerotic heart disease)     Bacteremia due to Staphylococcus aureus 10/13/2022    Depression     Diabetes mellitus (HCC)     GERD (gastroesophageal reflux disease)     Hepatitis C     WITH FULL RECOVERY - INFECTIOUS DISEASE DR. Meghna Sherman - LAST VISIT 2020    Hyperlipidemia     Hypertension     Irregular heartbeat     Murmur, cardiac     noted by PCP, no echo per patient    Urinary bladder incontinence     Wears eyeglasses     Well adult health check     PCP - DR. Quin Bush - 3/2021    Well adult health check     INFECTIOUS DISEASE - DR. Sherin Abreu    [facility] adult health check     GI - DR. Anai Munguia       Past Surgical History:   Procedure Laterality Date    BREAST SURGERY Right 1975    REMOVAL BENIGN TUMOR    COLONOSCOPY      HIP SURGERY Right 1964    RECONSTRUCTION    TONSILLECTOMY      AS A CHILD    TUBAL LIGATION  1975    VITRECTOMY Left 04/22/2021    VITRECTOMY Left 4/22/2021    VITRECTOMY 25 GAUGE, MEMBRANE PEEL, ICG performed by Andra Berumen MD at . [address]   Allergen Reactions    Lisinopril Other (See Comments)     COUGH       DATE ONSET: 10/11/22    Date of Evaluation: 10/27/2022    Evaluating Therapist: Erika Gutierres, SLP    Dysphagia Diagnosis    Dysphagia Diagnosis: Suspected needs further assessment; Moderate oral stage dysphagia    Recommended Diet    Diet Solids Recommendation: Pureed    Liquid Consistency Recommendation: Mildly Thick (Nectar)    Recommended Form of Meds: Meds in puree    Compensatory Swallowing Strategies : Eat/Feed slowly;Upright as possible for all oral intake;Remain upright for 30-45 minutes after meals;Assist feed; Alternate solids and liquids;Small bites/sips; Other (comments)    Reason for Referral    Rivera Levy was referred for a bedside swallow evaluation to assess the efficiency of her swallow function, identify signs and symptoms of aspiration, identify risk factors, and make recommendations regarding safe dietary consistencies, effective compensatory strategies, and safe eating environment. Prior Dysphagia History  Prior Dysphagia History: Unknown dysphagia history. Patient is currently on a puree and mildly-thick liquids. Patient Complaint  Patient Complaint: Patient has been taking minimal PO intake due to lethargy. General    Chart Reviewed: Yes  Behavior/Cognition: Confused; Lethargic;Uncooperative  Respiratory Status: Room air  O2 Device: None (Room air)  Follows Directions: None  Dentition: Poor dental/oral hygeine  Patient Positioning: Upright in chair  Baseline Vocal Quality: Normal  Prior Dysphagia History: Unknown dysphagia history. Patient is currently on a puree and mildly-thick liquids. Consistencies Administered: Mildly Thick- teaspoon;Pureed;Pills    Vision and Hearing    Vision  Vision: Within Functional Limits  Hearing  Hearing: Within functional limits    Current Diet level    Current Diet : Pureed    Oral Motor    Labial: Decreased rate;Decreased seal  Dentition: Full  Oral Hygiene: Dried secretions; Xerostomia  Lingual: Decreased strength; Incoordinated  Mandible: Restricted    Oral/Pharyngeal Phase    Oral Phase - Comment: Patient continues to present with mild oral phase dysphagia. Patient demonstrated improved ability to remove bolus from the spoon for 4 bites of puree solids. Patient appeared to actively wish to take bites as evidenced by moving mouth forward to take an additional bite. Patient demonstrated no oral residues post-swallow. Pharyngeal Phase: Patient demonstrated delayed swallow initiation of 2-3 seconds with each trial. Patient demonstrated no overt s/sx of aspiration penetration with puree solids. PO Trials  Assessment Method(s): Observation  Vocal Quality: No Impairment  Consistency Presented: Pureed  How Presented: SLP-fed/Presented  Bolus Acceptance: Impaired  Bolus Formation/Control: Impaired  Type of Impairment: Lip closure;Spillage  Propulsion: Discoordination  Oral Residue: None  Initiation of Swallow: Delayed (# of seconds) (2-3 seconds)  Laryngeal Elevation: Functional  Aspiration Signs/Symptoms: None  Pharyngeal Phase Characteristics: No impairment, issues, or problems  Effective Modifications: None         Dysphagia Diagnosis    Dysphagia Diagnosis: Suspected needs further assessment; Moderate oral stage dysphagia    Dysphagia Outcome Severity Scale: Level 3: Moderate dysphagia- Total assisstance, supervision or strategies. Two or more diet consistencies restricted    Recommendations    Requires SLP Intervention: Yes  Diet Solids Recommendation: Pureed  Liquid Consistency Recommendation: Mildly Thick (Nectar)  Compensatory Swallowing Strategies : Eat/Feed slowly;Upright as possible for all oral intake;Remain upright for 30-45 minutes after meals;Assist feed; Alternate solids and liquids;Small bites/sips; Other (comments)  Recommended Form of Meds: Meds in puree  Therapeutic Interventions: Diet tolerance monitoring; Bolus control exercises;Oral care; Patient/Family education  Frequency of Treatment: Daily during inpatient stay    Prognosis    Prognosis: Fair    Education    Individuals consulted  Consulted and agree with results and recommendations: Patient;RN  RN Name: Yon Yang    Patient Education: [address] educated RN re: treatment outcomes. Treatment/Goals    Short-term Goals  Timeframe for Short-term Goals: 4 days  Goal 1: Patient will trial thin liquids without overt s/sx of aspiration/penetration in 80% of opportunities. *unable to trial advanced textures due to poor alertness. Goal 2: Patient will trial minced and moist solids with adequate bolus prepartion/propulsion and no oral residues in 80% of opportunities. *unable to trial advanced textures due to poor alertness. Long-term Goals  Timeframe for Long-term Goals: 7 days  Goal 1: Patient will tolerate safest, least restrictive diet without overt s/sx of aspiration/penetration in 90% of opportunities. Safety Devices    Safety Devices  Safety Devices in place: Yes  Restraints Initially in Place: No    Pain Assessment    Pain Assessment: Caregiver notified (Patient wincing and moving in chair. Notified Aid)      Therapy Time    SLP Individual Minutes  Time In: 0945  Time Out: 1000  Minutes: 15     Patient seen in room sitting upright in chair. Patient was restless, but able to consume 4 bites of applesauce.  Patient continues to present with mild oral phase dysphagia. Patient demonstrated improved ability to remove bolus from the spoon for 4 bites of puree solids. Patient appeared to actively attempt to take bites as evidenced by moving mouth forward to take an additional bite, but then would not open her mouth for additional bites. Patient demonstrated no oral residues post-swallow. Patient demonstrated delayed swallow initiation of 2-3 seconds with each trial. Patient demonstrated no overt s/sx of aspiration penetration with puree solids. ST recommends continued diet of puree solids and nectar-thickened liquids when patient is alert. ST will continue to follow during inpatient stay.         Electronically signed: Kiki Maria M.S. [id number] [facility]             10/27/2022

## 2022-10-27 NOTE — PROGRESS NOTES
Occupational Therapy  Facility/Department: [address] [place] [address] MED SURG  Daily Treatment Note  NAME: Wandy Dior  : 1955  MRN: [id number]    Date of Service: 10/27/2022    Discharge Recommendations:  Continue to assess pending progress, Subacute/Skilled Nursing Facility         Patient Diagnosis(es): The primary encounter diagnosis was Hypernatremia. Diagnoses of Dehydration and Altered mental status, unspecified altered mental status type were also pertinent to this visit. Assessment    Activity Tolerance: Treatment limited secondary to decreased cognition;Patient limited by fatigue  Discharge Recommendations: Continue to assess pending progress;Subacute/Skilled Nursing Facility      Plan   Occupational Therapy Plan  Times Per Week: 7x/week  Times Per Day: Once a day  Current Treatment Recommendations: Strengthening; Endurance training;Balance training;Patient/Caregiver education & training; Safety education & training;Self-Care / ADL     Restrictions  Restrictions/Precautions  Restrictions/Precautions: General Precautions; Fall Risk    Subjective   Subjective  Subjective: Pt sitting up in bedside chair with telesitter present. Pt agreed to participate in therapy session. Pt eyes closed throughout therapy session hoever would occ respond to writer. Orientation  Overall Orientation Status: Impaired  Orientation Level: Unable to assess           Objective    Vitals       OT Exercises  Exercise Treatment: Pt tolerated BUE PROM x 7 planes x 10 reps x 1 set to inrease UE ROM in order to ease completion of ADL tasks. RBs given d/t occ facial grimacing. Safety Devices  Type of Devices: Call light within reach; Left in bed; Chair alarm in place; Telesitter in use  Restraints  Restraints Initially in Place: No     Patient Education  Education Given To: Patient  Education Provided: Role of Therapy;Plan of Care  Education Provided Comments: PROM  Education Method: Verbal  Barriers to Learning: Cognition  Education Outcome: Unable to demonstrate understanding; Unable to verbalize    Goals  Short Term Goals  Time Frame for Short Term Goals: 10 visits  Short Term Goal 1: Pt will complete AROM/AAROM to BUE as tolerated to maintain joint range of motion for improved participation in ADL and functional transfers. Short Term Goal 2: Pt will complete functional transfers during ADL tasks modA to improve safety and participation in ADL tasks. Short Term Goal 3: Pt will participate in bathing tasks while seated in chair or upright in bed with moderate verbal prompting and modA.        Therapy Time   Individual Concurrent Group Co-treatment   Time In 1029         Time Out 1044         Minutes 15                 TAVO De Santiago/SWETHA

## 2022-10-27 NOTE — PROGRESS NOTES
Patient asleep in bed. No pain based on scale. VS and assessment completed. Patient mumbles when writer attempts to wake her. Stiffens with any attempt to move her. Arnold in place.

## 2022-10-27 NOTE — PROGRESS NOTES
Notified Othella Fothergill CNP of no BM noted in patients chart.  Prn medication given at this time

## 2022-10-27 NOTE — PROGRESS NOTES
Patient resting in bed at this time and is restless. Patient is just waking up and does have tele sitter in room when writer is not at bedside sitting. Patient able to state her name and oriented to self only. Patient denies any pain at this time. Vitals and assessment as charted. Bed locked, call light within reach and writer at bedside at this time. Will continue to monitor. Tele sitter on.

## 2022-10-27 NOTE — PROGRESS NOTES
[facility] - Progress Note  - Telemedicine  Today's Date and Time: 10/27/2022, 11:44 AM    Impression :   MSSA septicemia 10-11-22 x 2  Lt parotitis from dehydration and MSSA  Dehydration  Hypernatremia-resolved  Altered mental status  SHAINA- Resolved  Cirrhosis of the liver  Major depression with psychotic features  Oral candidiasis    Patient evaluated by Telemedicine. Requesting Institution: [facility]  Provider Institution: [address] [place] [address],[address] at [facility]: Ms Ranjana Bear, APRN- IM    Recommendations:     Continue cefazolin 2 gm IV q 8 hr. Stop date 11-13-22  Alternatively she could receive Ceftriaxone 2 gm IV q 24 hr until 11-13-22, if the facility where she is going can not accommodate IV infusions 3 times daily. Repeat blood cultures x 2 on 11-18-22 to document clearing of septicemia  Oral nystatin  Medical Decision Making/Summary/Discussion:10/27/2022       Infection Control Recommendations   [place] Precautions    Antimicrobial Stewardship Recommendations     Simplification of therapy  Targeted therapy    Coordination of Outpatient Care:   Estimated Length of IV antimicrobials:11-13-22  Patient will need Midline Catheter Insertion: yes  Patient will need PICC line Insertion:no  Patient will need: Home IV , [place],  SNF,  LTAC:  Yes  Patient will need outpatient wound care:No    Chief complaint/reason for consultation:   MSSA septicemia      History of Present Illness:   Rivera Levy is a 74y.o.-year-old  female who was initially admitted on 10/11/2022. Patient seen at the request of     INITIAL HISTORY:     Patient was transferred to [facility] fro [place] on 10-11-22 because of altered mental status. She was found to be severely hypernatremic with a Na of 161, dehydrated and in SHAINA. She has received hydration with improvement. Has been evaluated by Nephrology.      She has a past Hx of ETOH intake, cirrhosis of the liver with ascites, CAD, essential HTN, DM 2, GERD, bladder incontinence. ID service asked to evaluate because of S aureus septicemia on 10-13-22. CURRENT EVALUATION :10/27/2022  /67   Pulse 63   Temp (!) 96.3 °F (35.7 °C) (Temporal)   Resp 18   Ht 5' 6\" (1.676 m)   Wt 148 lb (67.1 kg)   SpO2 98%   BMI 23.89 kg/m²     Afebrile  VS stable    The patient has been minimally interactive with staff and has continued confusion. The patient has been refusing oral medications    There is no growth on repeat blood cultures from 10/18/22  Midline placed for outpatient antibiotics    Discharge planning underway back to   No other acute issues noted    Labs, X rays reviewed: 10/27/2022    BUN: 4  Cr:0.47    WBC: 8.3-->7.5  Hb:12.4  Plat: 80-->97    Cultures:  Urine:    Blood:  10-11-22: MSSA x 2  10-18-22: No growth   Sputum :    Wound:      Discussed with VICK, RN. I have personally reviewed the past medical history, past surgical history, medications, social history, and family history, and I have updated the database accordingly. Past Medical History:     Past Medical History:   Diagnosis Date    Alcohol dependence in remission [facility])     Alcoholic cirrhosis of liver without ascites (HCC)     ASHD (arteriosclerotic heart disease)     Bacteremia due to Staphylococcus aureus 10/13/2022    Depression     Diabetes mellitus (HCC)     GERD (gastroesophageal reflux disease)     Hepatitis C     WITH FULL RECOVERY - INFECTIOUS DISEASE DR. Srinivasan Hidalgo - LAST VISIT 7/2020    Hyperlipidemia     Hypertension     Irregular heartbeat     Murmur, cardiac     noted by PCP, no echo per patient    Urinary bladder incontinence     Wears eyeglasses     Well adult health check     PCP - DR. Kj Guido - 3/2021    Well adult health check     INFECTIOUS DISEASE - DR. Megan Maldonado    Well adult health check     GI - DR. Eileen Denise       Past Surgical History:     Past Surgical History:   Procedure Laterality Date    BREAST SURGERY Right 1975    REMOVAL BENIGN TUMOR    COLONOSCOPY      HIP SURGERY Right 1964    RECONSTRUCTION    TONSILLECTOMY      AS A CHILD    TUBAL LIGATION  1975    VITRECTOMY Left 2021    VITRECTOMY Left 2021    VITRECTOMY 25 GAUGE, MEMBRANE PEEL, ICG performed by Jacey Dodd MD at [place] [address]       Medications:      haloperidol  5 mg Oral Nightly    haloperidol  2 mg Oral BID    risperiDONE  0.5 mg Oral Nightly    tamsulosin  0.4 mg Oral Daily    lidocaine 1 % injection  5 mL IntraDERmal Once    sodium chloride flush  5-40 mL IntraVENous 2 times per day    docusate sodium  100 mg Oral BID    senna  1 tablet Oral Nightly    chlorhexidine  15 mL Mouth/Throat BID    nystatin  5 mL Oral 4x Daily    ceFAZolin  2,000 mg IntraVENous Q8H    insulin lispro  0-8 Units SubCUTAneous TID WC    insulin lispro  0-4 Units SubCUTAneous Nightly    aspirin  81 mg Oral Daily    benztropine  1 mg Oral BID    divalproex  125 mg Oral TID    folic acid  1 mg Oral Daily    metoprolol tartrate  50 mg Oral BID    pantoprazole  40 mg Oral QAM AC    potassium chloride  10 mEq Oral Daily    pravastatin  40 mg Oral Daily    vitamin B-1  100 mg Oral Daily    sodium chloride flush  5-40 mL IntraVENous 2 times per day       Social History:     Social History     Socioeconomic History    Marital status: Single     Spouse name: Not on file    Number of children: Not on file    Years of education: Not on file    Highest education level: Not on file   Occupational History    Not on file   Tobacco Use    Smoking status: Former     Types: Cigarettes     Quit date:      Years since quittin.8    Smokeless tobacco: Never   Vaping Use    Vaping Use: Never used   Substance and Sexual Activity    Alcohol use: Not on file    Drug use: Not on file    Sexual activity: Not on file   Other Topics Concern    Not on file   Social History Narrative    Not on file     Social Determinants of Health     Financial Resource Strain: Not on file   Food Insecurity: Not on file   Transportation Needs: Not on file   Physical Activity: Not on file   Stress: Not on file   Social Connections: Not on file   Intimate Partner Violence: Not on file   Housing Stability: Not on file       Family History:   History reviewed. No pertinent family history. Allergies:   Lisinopril     Review of Systems:     Patient unable to provide ROS. Confused and agitated. 10/27/2022        Constitutional: No fevers or chills. No systemic complaints  Head: No headaches  Eyes: No double vision or blurry vision. No conjunctival inflammation. ENT: No sore throat or runny nose. . No hearing loss, tinnitus or vertigo. Cardiovascular: No chest pain or palpitations. No shortness of breath. No TESFAYE  Lung: No shortness of breath or cough. No sputum production  Abdomen: No nausea, vomiting, diarrhea, or abdominal pain. Gabe Salm No cramps. Genitourinary: No increased urinary frequency, or dysuria. No hematuria. No suprapubic or CVA pain  Musculoskeletal: No muscle aches or pains. No joint effusions, swelling or deformities  Hematologic: No bleeding or bruising. Neurologic: No headache, weakness, numbness, or tingling. Integument: No rash, no ulcers. Psychiatric: No depression. Endocrine: No polyuria, no polydipsia, no polyphagia. Physical Examination :   Patient Vitals for the past 8 hrs:   BP Temp Temp src Pulse Resp SpO2 Weight   10/27/22 0641 109/67 (!) 96.3 °F (35.7 °C) Temporal 63 18 98 % --   10/27/22 0518 -- -- -- -- -- -- 148 lb (67.1 kg)     General Appearance: Awake, mentation altered and in no apparent distress  Head:  Normocephalic, no trauma  Eyes: Pupils equal, round, reactive to light and accommodation; extraocular movements intact; sclera anicteric; conjunctivae pink. No embolic phenomena. ENT: Oropharynx clear, without erythema, exudate, or thrush. No tenderness of sinuses.  Mouth/throat: mucosa pink and moist. No lesions. Lt side parotitis   Neck:Supple, without lymphadenopathy. Thyroid normal, No bruits. Pulmonary/Chest: Clear to auscultation, without wheezes, rales, or rhonchi. No dullness to percussion. Cardiovascular: Regular rate and rhythm without murmurs, rubs, or gallops. Abdomen: Soft, non tender. Bowel sounds normal. No organomegaly. Ascites. All four Extremities: No cyanosis, clubbing, edema, or effusions. Neurologic: No gross sensory or motor deficits. Confused and agitated . Has tremors  Skin: Warm and dry with good turgor. No signs of peripheral arterial or venous insufficiency. No ulcerations. No open wounds. Medical Decision Making -Laboratory:   I have independently reviewed/ordered the following labs:    CBC with Differential:   No results for input(s): WBC, HGB, HCT, PLT, SEGSPCT, BANDSPCT, LYMPHOPCT, MONOPCT, EOSPCT in the last 72 hours. BMP:   Recent Labs     10/26/22  0538      K 3.5*      CO2 26   BUN 4*   CREATININE 0.47*   MG 1.7     Hepatic Function Panel:   No results for input(s): PROT, LABALBU, BILIDIR, IBILI, BILITOT, ALKPHOS, ALT, AST in the last 72 hours. No results for input(s): RPR in the last 72 hours. No results for input(s): HIV in the last 72 hours. No results for input(s): BC in the last 72 hours. Lab Results   Component Value Date/Time    MUCUS TRACE 10/11/2022 07:00 PM    RBC 4.23 10/18/2022 06:20 AM    WBC 7.5 10/18/2022 06:20 AM    TURBIDITY Clear 10/11/2022 07:00 PM     Lab Results   Component Value Date/Time    CREATININE 0.47 10/26/2022 05:38 AM    GLUCOSE 106 10/26/2022 05:38 AM       Medical Decision Making-Imaging:     EXAMINATION:   ONE XRAY VIEW OF THE CHEST       10/11/2022 10:30 am       COMPARISON:   None. HISTORY:   ORDERING SYSTEM PROVIDED HISTORY: altered mental status   TECHNOLOGIST PROVIDED HISTORY:   altered mental status       FINDINGS:   The heart is not enlarged. No pulmonary venous congestion or edema.   No   convincing lung consolidation or infiltrate. No pleural effusion or   pneumothorax. Osseous structures grossly intact. Impression   No acute cardiopulmonary process     Medical Decision Arfsrd-Xzsackvb-Hynjy:       Medical Decision Making-Other:     Note:  Labs, medications, radiologic studies were reviewed with personal review of films  Large amounts of data were reviewed  Discussed with nursing Staff, Discharge planner  Infection Control and Prevention measures reviewed  All prior entries were reviewed  Administer medications as ordered  Prognosis: Guarded  Discharge planning reviewed  Follow up as outpatient. Thank you for allowing us to participate in the care of this patient. Please call with questions. ROB Almanza - CNP    ATTESTATION:    I have discussed the case, including pertinent history and exam findings with the APRN. I have evaluated the  History, physical findings and pictures of the patient and the key elements of the encounter have been performed by me. I have reviewed the laboratory data, other diagnostic studies and discussed them with the APRN. I have updated the medical record where necessary. I agree with the assessment, plan and orders as documented by the APRN.     Katarzyna Posey MD.      Pager: [phone] - Office: [phone]

## 2022-10-27 NOTE — PLAN OF CARE
Problem: Discharge Planning  Goal: Discharge to home or other facility with appropriate resources  Outcome: Progressing  Flowsheets  Taken 10/27/2022 1114 by Elle Tapia  Discharge to home or other facility with appropriate resources:   Arrange for needed discharge resources and transportation as appropriate   Identify barriers to discharge with patient and caregiver   Identify discharge learning needs (meds, wound care, etc)   Refer to discharge planning if patient needs post-hospital services based on physician order or complex needs related to functional status, cognitive ability or social support system    Problem: Skin/Tissue Integrity  Goal: Absence of new skin breakdown  Description: 1. Monitor for areas of redness and/or skin breakdown  2. Assess vascular access sites hourly  3. Every 4-6 hours minimum:  Change oxygen saturation probe site  4. Every 4-6 hours:  If on nasal continuous positive airway pressure, respiratory therapy assess nares and determine need for appliance change or resting period. Outcome: Progressing  Note: Desmond scale monitoring per protocol. Inspect skin for breakdown frequently. Encourage pt to make frequent large adjustments in position or assist patient with turning. Document all areas of breakdown.   Patient on a dolphin mattress as well while in bed     Problem: Pain  Goal: Verbalizes/displays adequate comfort level or baseline comfort level  Outcome: Progressing  Flowsheets  Taken 10/27/2022 1114 by Elle Tapia  Verbalizes/displays adequate comfort level or baseline comfort level:   Consider cultural and social influences on pain and pain management   Assess pain using appropriate pain scale   Implement non-pharmacological measures as appropriate and evaluate response  Taken 10/26/2022 2312 by Tiffanie De Souza RN  Verbalizes/displays adequate comfort level or baseline comfort level: Encourage patient to monitor pain and request assistance     Problem: Skin/Tissue Integrity  Goal: Absence of new skin breakdown  Description: 1. Monitor for areas of redness and/or skin breakdown  2. Assess vascular access sites hourly  3. Every 4-6 hours minimum:  Change oxygen saturation probe site  4. Every 4-6 hours:  If on nasal continuous positive airway pressure, respiratory therapy assess nares and determine need for appliance change or resting period. Outcome: Progressing  Note: Desmond scale monitoring per protocol. Inspect skin for breakdown frequently. Encourage pt to make frequent large adjustments in position or assist patient with turning. Document all areas of breakdown.   Patient on a dolphin mattress as well while in bed

## 2022-10-27 NOTE — PROGRESS NOTES
Patient in bed asleep. Writer attempted to wake patient, patient mumbles in response but wont open her eyes. Writer attempted to give medications, patient refused to open her mouth. Writer and therapy got patient up to the chair. Writer attempted to give oral mediations in applesauce again, patient continues to refused to take them. Normal vision: sees adequately in most situations; can see medication labels, newsprint/distance and reading glasses

## 2022-10-27 NOTE — PROGRESS NOTES
[facility]  Inpatient/Observation/Outpatient Rehabilitation    Date: 10/27/2022  Patient Name: Rivera Levy       [x] Inpatient Acute/Observation       []  Outpatient  : 1955         [x] Pt cancelled due to:  [] No Reason Given   [] Sick/ill   [x] Other:  Pt is very agitated and therefore not appropriate for therapy at this time. Therapist/Assistant will attempt to see this patient, at our earliest opportunity.        J Luis Zavala, PTA Date: 10/27/2022

## 2022-10-28 VITALS
BODY MASS INDEX: 23.23 KG/M2 | OXYGEN SATURATION: 94 % | RESPIRATION RATE: 18 BRPM | HEART RATE: 69 BPM | HEIGHT: 66 IN | SYSTOLIC BLOOD PRESSURE: 127 MMHG | DIASTOLIC BLOOD PRESSURE: 93 MMHG | TEMPERATURE: 97.3 F | WEIGHT: 144.56 LBS

## 2022-10-28 PROBLEM — F05 DELIRIUM DUE TO MEDICAL CONDITION WITH BEHAVIORAL DISTURBANCE: Status: ACTIVE | Noted: 2022-10-28

## 2022-10-28 PROBLEM — F03.90 MAJOR NEUROCOGNITIVE DISORDER (HCC): Status: ACTIVE | Noted: 2022-10-28

## 2022-10-28 LAB
ANION GAP SERPL CALCULATED.3IONS-SCNC: 11 MMOL/L (ref 9–17)
BUN BLDV-MCNC: 3 MG/DL (ref 8–23)
BUN/CREAT BLD: 6 (ref 9–20)
CALCIUM SERPL-MCNC: 8.4 MG/DL (ref 8.6–10.4)
CHLORIDE BLD-SCNC: 106 MMOL/L (ref 98–107)
CO2: 25 MMOL/L (ref 20–31)
CREAT SERPL-MCNC: 0.47 MG/DL (ref 0.5–0.9)
GFR SERPL CREATININE-BSD FRML MDRD: >60 ML/MIN/1.73M2
GLUCOSE BLD-MCNC: 116 MG/DL (ref 74–100)
GLUCOSE BLD-MCNC: 143 MG/DL (ref 74–100)
GLUCOSE BLD-MCNC: 150 MG/DL (ref 74–100)
GLUCOSE BLD-MCNC: 163 MG/DL (ref 70–99)
MAGNESIUM: 1.5 MG/DL (ref 1.6–2.6)
POTASSIUM SERPL-SCNC: 3.5 MMOL/L (ref 3.7–5.3)
SARS-COV-2, RAPID: NOT DETECTED
SODIUM BLD-SCNC: 142 MMOL/L (ref 135–144)
SPECIMEN DESCRIPTION: NORMAL

## 2022-10-28 PROCEDURE — 83735 ASSAY OF MAGNESIUM: CPT

## 2022-10-28 PROCEDURE — 6370000000 HC RX 637 (ALT 250 FOR IP): Performed by: PSYCHIATRY & NEUROLOGY

## 2022-10-28 PROCEDURE — 36415 COLL VENOUS BLD VENIPUNCTURE: CPT

## 2022-10-28 PROCEDURE — 97530 THERAPEUTIC ACTIVITIES: CPT

## 2022-10-28 PROCEDURE — C9803 HOPD COVID-19 SPEC COLLECT: HCPCS

## 2022-10-28 PROCEDURE — 94761 N-INVAS EAR/PLS OXIMETRY MLT: CPT

## 2022-10-28 PROCEDURE — 99232 SBSQ HOSP IP/OBS MODERATE 35: CPT | Performed by: INTERNAL MEDICINE

## 2022-10-28 PROCEDURE — 80048 BASIC METABOLIC PNL TOTAL CA: CPT

## 2022-10-28 PROCEDURE — 92526 ORAL FUNCTION THERAPY: CPT

## 2022-10-28 PROCEDURE — 6370000000 HC RX 637 (ALT 250 FOR IP): Performed by: NURSE PRACTITIONER

## 2022-10-28 PROCEDURE — 82947 ASSAY GLUCOSE BLOOD QUANT: CPT

## 2022-10-28 PROCEDURE — 97110 THERAPEUTIC EXERCISES: CPT

## 2022-10-28 PROCEDURE — 6360000002 HC RX W HCPCS: Performed by: NURSE PRACTITIONER

## 2022-10-28 PROCEDURE — 97140 MANUAL THERAPY 1/> REGIONS: CPT

## 2022-10-28 PROCEDURE — 87635 SARS-COV-2 COVID-19 AMP PRB: CPT

## 2022-10-28 PROCEDURE — APPSS30 APP SPLIT SHARED TIME 16-30 MINUTES: Performed by: NURSE PRACTITIONER

## 2022-10-28 PROCEDURE — 6360000002 HC RX W HCPCS: Performed by: STUDENT IN AN ORGANIZED HEALTH CARE EDUCATION/TRAINING PROGRAM

## 2022-10-28 PROCEDURE — 6370000000 HC RX 637 (ALT 250 FOR IP): Performed by: FAMILY MEDICINE

## 2022-10-28 PROCEDURE — 51702 INSERT TEMP BLADDER CATH: CPT

## 2022-10-28 PROCEDURE — 2580000003 HC RX 258: Performed by: FAMILY MEDICINE

## 2022-10-28 RX ORDER — MAGNESIUM SULFATE 1 G/100ML
1000 INJECTION INTRAVENOUS ONCE
Status: COMPLETED | OUTPATIENT
Start: 2022-10-28 | End: 2022-10-28

## 2022-10-28 RX ORDER — METOPROLOL TARTRATE 50 MG/1
50 TABLET, FILM COATED ORAL 2 TIMES DAILY
Qty: 60 TABLET | Refills: 3 | DISCHARGE
Start: 2022-10-28

## 2022-10-28 RX ORDER — HALOPERIDOL 5 MG/1
5 TABLET ORAL 2 TIMES DAILY PRN
Qty: 10 TABLET | Refills: 0 | Status: SHIPPED | OUTPATIENT
Start: 2022-10-28 | End: 2022-11-02

## 2022-10-28 RX ORDER — BISACODYL 10 MG
10 SUPPOSITORY, RECTAL RECTAL DAILY PRN
DISCHARGE
Start: 2022-10-28 | End: 2022-11-27

## 2022-10-28 RX ORDER — ONDANSETRON 4 MG/1
4 TABLET, ORALLY DISINTEGRATING ORAL EVERY 8 HOURS PRN
DISCHARGE
Start: 2022-10-28

## 2022-10-28 RX ORDER — RISPERIDONE 0.5 MG/1
1.5 TABLET ORAL NIGHTLY
Qty: 90 TABLET | Refills: 3 | Status: SHIPPED | OUTPATIENT
Start: 2022-10-28

## 2022-10-28 RX ORDER — DIVALPROEX SODIUM 125 MG/1
250 CAPSULE, COATED PELLETS ORAL 3 TIMES DAILY
Status: DISCONTINUED | OUTPATIENT
Start: 2022-10-28 | End: 2022-10-28 | Stop reason: HOSPADM

## 2022-10-28 RX ORDER — DIVALPROEX SODIUM 125 MG/1
250 CAPSULE, COATED PELLETS ORAL 3 TIMES DAILY
Qty: 180 CAPSULE | Refills: 3 | Status: SHIPPED | OUTPATIENT
Start: 2022-10-28

## 2022-10-28 RX ORDER — CHLORHEXIDINE GLUCONATE 0.12 MG/ML
15 RINSE ORAL 2 TIMES DAILY
Qty: 420 ML | Refills: 0 | DISCHARGE
Start: 2022-10-28 | End: 2022-11-11

## 2022-10-28 RX ORDER — HALOPERIDOL 5 MG/ML
2 INJECTION INTRAMUSCULAR ONCE
Status: COMPLETED | OUTPATIENT
Start: 2022-10-28 | End: 2022-10-28

## 2022-10-28 RX ORDER — TAMSULOSIN HYDROCHLORIDE 0.4 MG/1
0.4 CAPSULE ORAL DAILY
Qty: 30 CAPSULE | Refills: 3 | DISCHARGE
Start: 2022-10-29

## 2022-10-28 RX ORDER — LORAZEPAM 0.5 MG/1
0.5 TABLET ORAL NIGHTLY PRN
Qty: 5 TABLET | Refills: 0 | Status: SHIPPED | OUTPATIENT
Start: 2022-10-28 | End: 2022-11-02

## 2022-10-28 RX ADMIN — NYSTATIN 500000 UNITS: 100000 SUSPENSION ORAL at 08:06

## 2022-10-28 RX ADMIN — HALOPERIDOL LACTATE 2 MG: 5 INJECTION, SOLUTION INTRAMUSCULAR at 15:32

## 2022-10-28 RX ADMIN — PANTOPRAZOLE SODIUM 40 MG: 40 TABLET, DELAYED RELEASE ORAL at 08:05

## 2022-10-28 RX ADMIN — CEFAZOLIN 2000 MG: 2 INJECTION, POWDER, FOR SOLUTION INTRAMUSCULAR; INTRAVENOUS at 04:12

## 2022-10-28 RX ADMIN — DIVALPROEX SODIUM 125 MG: 125 CAPSULE ORAL at 08:06

## 2022-10-28 RX ADMIN — TAMSULOSIN HYDROCHLORIDE 0.4 MG: 0.4 CAPSULE ORAL at 08:06

## 2022-10-28 RX ADMIN — CEFAZOLIN 2000 MG: 2 INJECTION, POWDER, FOR SOLUTION INTRAMUSCULAR; INTRAVENOUS at 20:01

## 2022-10-28 RX ADMIN — DIVALPROEX SODIUM 250 MG: 125 CAPSULE ORAL at 14:59

## 2022-10-28 RX ADMIN — BENZTROPINE MESYLATE 1 MG: 1 TABLET ORAL at 08:06

## 2022-10-28 RX ADMIN — HALOPERIDOL LACTATE 2 MG: 5 INJECTION, SOLUTION INTRAMUSCULAR at 17:35

## 2022-10-28 RX ADMIN — ASPIRIN 81 MG: 81 TABLET, COATED ORAL at 08:05

## 2022-10-28 RX ADMIN — DOCUSATE SODIUM 100 MG: 100 CAPSULE, LIQUID FILLED ORAL at 08:06

## 2022-10-28 RX ADMIN — POTASSIUM CHLORIDE 10 MEQ: 10 TABLET, EXTENDED RELEASE ORAL at 08:06

## 2022-10-28 RX ADMIN — THIAMINE HCL TAB 100 MG 100 MG: 100 TAB at 08:06

## 2022-10-28 RX ADMIN — PRAVASTATIN SODIUM 40 MG: 20 TABLET ORAL at 08:06

## 2022-10-28 RX ADMIN — CHLORHEXIDINE GLUCONATE 0.12% ORAL RINSE 15 ML: 1.2 LIQUID ORAL at 08:06

## 2022-10-28 RX ADMIN — METOPROLOL TARTRATE 50 MG: 50 TABLET, FILM COATED ORAL at 08:06

## 2022-10-28 RX ADMIN — FOLIC ACID 1 MG: 1 TABLET ORAL at 08:05

## 2022-10-28 RX ADMIN — SODIUM CHLORIDE 25 ML: 9 INJECTION, SOLUTION INTRAVENOUS at 20:00

## 2022-10-28 RX ADMIN — MAGNESIUM SULFATE HEPTAHYDRATE 1000 MG: 1 INJECTION, SOLUTION INTRAVENOUS at 15:30

## 2022-10-28 RX ADMIN — CEFAZOLIN 2000 MG: 2 INJECTION, POWDER, FOR SOLUTION INTRAMUSCULAR; INTRAVENOUS at 13:29

## 2022-10-28 ASSESSMENT — PAIN SCALES - WONG BAKER
WONGBAKER_NUMERICALRESPONSE: 0
WONGBAKER_NUMERICALRESPONSE: 0

## 2022-10-28 ASSESSMENT — PAIN SCALES - GENERAL: PAINLEVEL_OUTOF10: 0

## 2022-10-28 NOTE — PROGRESS NOTES
Patient sitting up in chair picking at self and surroundings attempting to get out of chair, states \"I just need to get home\". Educated patient she is in hospital for medication. Patient ate container of sherbet and applesauce. HS medication given while patient willing to take at this time. Patient continue to chew on hands fingers and the air. Writer stops patient from possible self harm and reorients to surroundings and situation.

## 2022-10-28 NOTE — PROGRESS NOTES
At approximately 1945 patient began to have increased agitation. HS and PRN HS medications given. Patient refused to remain seated in chair, patient was assisted back to bed. Patient continued to have increased agitation and confusion with confused speech. Writer continued to reorient patient to surroundings and situation. As agitation continued to increase, patient was attempting to self harm by biting self, pulling hair, pulling at IV and meeks, and trying to scratch self. Patient also attempting to self harm staff by biting and scratching. Dr Diaz Schrader notified and a 1 time order received, see orders. Micah Eagle will continue to stay at bedside to keep patient from self harm until medication assist or patient relaxes.

## 2022-10-28 NOTE — PROGRESS NOTES
Physical Therapy  Facility/Department: [facility] AT THE [facility] MED SURG  Daily Treatment Note  NAME: Castillo Gray  : 1955  MRN: [id number]    Date of Service: 10/28/2022    Discharge Recommendations:  [address] [place]     Patient Diagnosis(es): The primary encounter diagnosis was Hypernatremia. Diagnoses of Dehydration and Altered mental status, unspecified altered mental status type were also pertinent to this visit. Assessment   Assessment: PROM completed to B LE's to prevent blood clots and contractures and maintain ROM. Resistance noted at times. Will continue to progress as tolerated. Plan    Physcial Therapy Plan  General Plan: 2 times a day 7 days a week (1x per day on weekends.)     Restrictions  Restrictions/Precautions  Restrictions/Precautions: General Precautions, Fall Risk  Required Braces or Orthoses?: No     Subjective    Subjective  Subjective: pt in chair upon arrival, no changes to report from the staff, pt unable to appropriatly answer questions. Orientation  Overall Orientation Status: Impaired     Objective   Vitals     Bed Mobility Training  Bed Mobility Training: No (Nursing has just got patient to chair and requested she stay there for the time being as she appears comfortable. )     PT Exercises  Exercise Treatment: PROM B LE's 2 x15 in all available planes of motion. Pt with instances of resistance, other times completely relaxed. No noted participation this date. Safety Devices  Type of Devices: Sitter present;Nurse notified; Heels elevated for pressure relief;Patient at risk for falls; Left in chair;Call light within reach; All fall risk precautions in place (Pt remains on 1:1 supervision. Evelina Angelo present upon completion of treatment.)  Restraints  Restraints Initially in Place: No     Goals  Short Term Goals  Time Frame for Short Term Goals: 3 DAYS  Short Term Goal 1: MOD ASSIST BED MOBILITY  Short Term Goal 2: MIN ASSIST TRANSFERS.   Long Term Goals  Time Frame for Long Term Goals : 4 WEEKS  Long Term Goal 1: MIN ASSIST TRANSFERS AND MIN ASSIST BED MOBILITY. Patient Goals   Patient Goals : UNABLE TO ASCERTAIN DUE TO IMPAIRED COGNITION AND LETHARGY.     Therapy Time   Individual Concurrent Group Co-treatment   Time In 1034         Time Out Sienna Moise [id number] [phone]

## 2022-10-28 NOTE — DISCHARGE SUMMARY
Discharge Summary    Fortunato Roes  :  1955  MRN:  [id number]    Admit date:  10/11/2022      Discharge date: 10/28/2022     Admitting Physician:  Manny Holder MD    Discharge Diagnoses:    Principal Problem:    Hypernatremia  Active Problems:    Dehydration    Major depressive disorder with psychotic features ([facility]    Type 2 diabetes mellitus without complication ([address]    Essential hypertension    ASHD (arteriosclerotic heart disease)    Gastroesophageal reflux disease without esophagitis    Bacteremia due to Staphylococcus aureus    MSSA (methicillin susceptible Staphylococcus aureus) septicemia (HCC)    Parotitis, acute    SHAINA (acute kidney injury) ([address]    Alcoholic cirrhosis of liver with ascites ([facility]    Major depressive disorder    ALEXX (obstructive sleep apnea)-witnessed    Hypokalemia    Mild malnutrition ([facility]  Resolved Problems:    * No resolved hospital problems. *      Hospital Course:   Fortunato Rose is a 74 y.o. female admitted with hypernatremia. She presented to the emergency room from [facility] due to hypernatremia. They reported a sodium of 161. ER provider stated upon arrival patient appeared to be very dehydrated and was minimally responsive. Patient was breathing spontaneously without labored respirations. Her SPO2 was 95% on room air. No family members at bedside to get a clear understanding of her baseline mental status. In review of her medication list patient is on several psychotropic drugs and duplicates that include IV and/or oral delivery. Patient is also on Jardiance. Patient is on lactulose as well. Patient does have history of alcohol dependence according to medical records. Patient also has history of alcoholic cirrhosis of the liver without ascites. She was hypotensive upon arrival.  Radiology studies were negative. Patient's BUN was elevated at 72 and creatinine of 2.50. Patient was found to be hyponatremic at 157 with a chloride of 114.   During patient's admission infectious disease, nephrology and psychiatry were consulted. Patient was found to have MSSA septicemia and was placed on IV Ancef. She will continue this through November 13. PICC line was placed and she is tolerated this well. Electrolytes have been replaced. Psychiatric medications have been adjusted. Patient will be discharged today to [address] [place] [address]. Patient does continue to have some behaviors at times but most times she does not. She does have periods of restlessness. Hemodynamically she is back to baseline. Consultants:   Dr. Alonso Suh [facility]; Dr. Tamy Vidales, Nephrology; Dr. Tereso Rodgers, psychiatry    Procedures: picc line placement    Complications: none    Discharge Condition: fair    Exam:  GEN:   eyes closed easy resp. No distress Stimulation given to awaken and encourage oral intake  EYES:   EOMI, pupils equal   NECK: Supple. No lymphadenopathy. No carotid bruit  CVS:     regular rate and rhythm, no audible murmur  PULM:  diminished but CTA, no wheezes, rales or rhonchi, no acute respiratory distress  ABD:     Bowels sounds normal.  Abdomen is soft. No distention. no tenderness to palpation. EXT:     no edema bilaterally . No calf tenderness. NEURO: Moves all extremities. Motor and sensory are grossly intact. SKIN:    No rashes.   No skin lesions      Significant Diagnostic Studies:   Lab Results   Component Value Date    WBC 7.5 10/18/2022    HGB 12.4 10/18/2022    PLT See Reflexed IPF Result 10/18/2022       Lab Results   Component Value Date    BUN 3 (L) 10/28/2022    CREATININE 0.47 (L) 10/28/2022     10/28/2022    K 3.5 (L) 10/28/2022    CALCIUM 8.4 (L) 10/28/2022     10/28/2022    CO2 25 10/28/2022    LABGLOM >60 10/28/2022       Lab Results   Component Value Date    WBCUA 2 TO 5 10/11/2022    RBCUA 2 TO 5 10/11/2022    EPITHUA 0 TO 2 10/11/2022    LEUKOCYTESUR NEGATIVE 10/11/2022    SPECGRAV 1.020 10/11/2022    GLUCOSEU 3+ (A) 10/11/2022    KETUA NEGATIVE 10/11/2022    PROTEINU NEGATIVE 10/11/2022    HGBUR 1+ (A) 10/11/2022    BACTERIA 1+ (A) 10/11/2022       CT Head W/O Contrast    Result Date: 10/11/2022  EXAMINATION: CT OF THE HEAD WITHOUT CONTRAST  10/11/2022 10:40 am TECHNIQUE: CT of the head was performed without the administration of intravenous contrast. Automated exposure control, iterative reconstruction, and/or weight based adjustment of the mA/kV was utilized to reduce the radiation dose to as low as reasonably achievable. COMPARISON: None. HISTORY: ORDERING SYSTEM PROVIDED HISTORY: altered mental status TECHNOLOGIST PROVIDED HISTORY: altered mental status Decision Support Exception - unselect if not a suspected or confirmed emergency medical condition->Emergency Medical Condition (MA) FINDINGS: BRAIN/VENTRICLES: There is no acute intracranial hemorrhage, mass effect or midline shift. No abnormal extra-axial fluid collection. The gray-white differentiation is maintained without evidence of an acute infarct. There is no evidence of hydrocephalus. Prominence of the ventricular system. Marked prominence of sulcation, greater on the left and over the frontal convexities and the left parietal lobe. Areas of decreased attenuation density in the deep white matter and periventricular regions compatible with old ischemic changes and or areas of demyelinization. ORBITS: The visualized portion of the orbits demonstrate no acute abnormality. SINUSES: The visualized paranasal sinuses and mastoid air cells demonstrate no acute abnormality. SOFT TISSUES/SKULL:  No acute abnormality of the visualized skull or soft tissues. No acute intracranial abnormality. Prominent senescent changes. These appear more prominent than usually seen in a patient of this age, but could be due to old injuries, various medications, or history of substance abuse.      XR CHEST PORTABLE    Result Date: 10/11/2022  EXAMINATION: ONE XRAY VIEW OF THE CHEST 10/11/2022 10:30 am COMPARISON: None. HISTORY: ORDERING SYSTEM PROVIDED HISTORY: altered mental status TECHNOLOGIST PROVIDED HISTORY: altered mental status FINDINGS: The heart is not enlarged. No pulmonary venous congestion or edema. No convincing lung consolidation or infiltrate. No pleural effusion or pneumothorax. Osseous structures grossly intact. No acute cardiopulmonary process       Assessment and Plan:  Patient Active Problem List    Diagnosis Date Noted    Mild malnutrition ([address] 10/25/2022    Hypokalemia 10/19/2022    ALEXX (obstructive sleep apnea)-witnessed 10/18/2022    MSSA (methicillin susceptible Staphylococcus aureus) septicemia ([address] 10/17/2022    Parotitis, acute 10/17/2022    SHAINA (acute kidney injury) ([address] 19/83/4788    Alcoholic cirrhosis of liver with ascites ([address] 10/17/2022    Major depressive disorder 10/17/2022    Bacteremia due to Staphylococcus aureus 10/13/2022    Hypernatremia 10/11/2022    Dehydration 10/11/2022    Major depressive disorder with psychotic features ([address] 10/11/2022    Type 2 diabetes mellitus without complication ([address] 28/70/9839    Essential hypertension 10/11/2022    ASHD (arteriosclerotic heart disease) 10/11/2022    Gastroesophageal reflux disease without esophagitis 10/11/2022    Macular pucker, left eye 04/22/2021        Discharge Medications:         Medication List        START taking these medications      bisacodyl 10 MG suppository  Commonly known as: DULCOLAX  Place 1 suppository rectally daily as needed for Constipation     ceFAZolin  infusion  Commonly known as: ANCEF  Infuse 2,000 mg intravenously in the morning and 2,000 mg at noon and 2,000 mg in the evening. Do all this for 16 days.  Compound per protocol MIDLINE/PICC flush per protocol with NS.     chlorhexidine 0.12 % solution  Commonly known as: PERIDEX  Take 15 mLs by mouth 2 times daily for 14 days     nystatin 805902 UNIT/ML suspension  Commonly known as: MYCOSTATIN  Take 5 mLs by mouth 4 times daily     ondansetron 4 MG disintegrating tablet  Commonly known as: ZOFRAN-ODT  Take 1 tablet by mouth every 8 hours as needed for Nausea or Vomiting     tamsulosin 0.4 MG capsule  Commonly known as: FLOMAX  Take 1 capsule by mouth daily  Start taking on: October 29, 2022            CHANGE how you take these medications      benztropine 1 MG tablet  Commonly known as: COGENTIN  What changed: Another medication with the same name was removed. Continue taking this medication, and follow the directions you see here. divalproex 125 MG capsule  Commonly known as: DEPAKOTE SPRINKLE  Take 2 capsules by mouth in the morning, at noon, and at bedtime Take 250 mg by mouth in the morning, at noon, and at bedtime  What changed: additional instructions     haloperidol 5 MG tablet  Commonly known as: HALDOL  Take 1 tablet by mouth 2 times daily as needed for Agitation  What changed: when to take this     LORazepam 0.5 MG tablet  Commonly known as: ATIVAN  Take 1 tablet by mouth nightly as needed for Anxiety for up to 5 days. What changed:   medication strength  how much to take  when to take this  Another medication with the same name was removed. Continue taking this medication, and follow the directions you see here.      risperiDONE 0.5 MG tablet  Commonly known as: RISPERDAL  Take 3 tablets by mouth nightly  What changed:   medication strength  how much to take  when to take this            CONTINUE taking these medications      ARIPiprazole 15 MG tablet  Commonly known as: ABILIFY     aspirin 81 MG EC tablet     BIOTENE DRY MOUTH DT     dulaglutide 1.5 MG/0.5ML SC injection  Commonly known as: TRULICITY     empagliflozin 25 MG tablet  Commonly known as: JARDIANCE     folic acid 1 MG tablet  Commonly known as: FOLVITE     glipiZIDE 5 MG extended release tablet  Commonly known as: GLUCOTROL XL     insulin lispro 100 UNIT/ML injection vial  Commonly known as: HUMALOG     lactulose encephalopathy 10 GM/15ML Soln solution     losartan 25 MG tablet  Commonly known as: COZAAR     metoprolol tartrate 50 MG tablet  Commonly known as: LOPRESSOR  Take 1 tablet by mouth 2 times daily     Multiple Vitamin Tabs     omeprazole 20 MG delayed release capsule  Commonly known as: PRILOSEC     potassium chloride 10 MEQ extended release tablet  Commonly known as: KLOR-CON M     pravastatin 40 MG tablet  Commonly known as: PRAVACHOL     vitamin B-1 100 MG tablet  Commonly known as: THIAMINE            STOP taking these medications      buPROPion 150 MG extended release tablet  Commonly known as: WELLBUTRIN XL     furosemide 40 MG tablet  Commonly known as: LASIX     haloperidol lactate 5 MG/ML injection  Commonly known as: HALDOL     saliva substitute Soln liquid     VORTIoxetine HBr 20 MG Tabs tablet  Commonly known as: TRINTELLIX     ziprasidone 20 MG injection  Commonly known as: GEODON               Where to Get Your Medications        You can get these medications from any pharmacy    Bring a paper prescription for each of these medications  ceFAZolin  infusion  divalproex 125 MG capsule  haloperidol 5 MG tablet  LORazepam 0.5 MG tablet  risperiDONE 0.5 MG tablet       Information about where to get these medications is not yet available    Ask your nurse or doctor about these medications  bisacodyl 10 MG suppository  chlorhexidine 0.12 % solution  metoprolol tartrate 50 MG tablet  nystatin 142143 UNIT/ML suspension  ondansetron 4 MG disintegrating tablet  tamsulosin 0.4 MG capsule         Patient Instructions:    Activity: activity as tolerated  Diet: encourage fluids  Wound Care: none needed  Other: None     Disposition:   DC to [facility]    Follow up:  Patient will be followed by Ana García DO in 1-2 weeks    CORE MEASURES on Discharge (if applicable)  ACE/ARB in CHF: NA  Statin in MI: NA  ASA in MI: NA  Statin in CVA: NA  Antiplatelet in CVA: NA    Total time spent on discharge services: 45 minutes    Including the following activities:  Evaluation and Management of patient  Discussion with patient and/or surrogate about current care plan  Coordination with Case Management and/or   Coordination of care with Consultants (if applicable)   Coordination of care with Receiving Facility Physician (if applicable)  Completion of DME forms (if applicable)  Preparation of Discharge Summary  Preparation of Medication Reconciliation  Preparation of Discharge Prescriptions    Signed:  ROB Marion CNP, ROB, NP-C  10/28/2022, 3:14 PM

## 2022-10-28 NOTE — PROGRESS NOTES
Patient refused to stay seated. Asked for bathroom then refused and stood back up when sat on commode. Patient returns to bed at this time with extensive staff assist. Will continue to assist patient to assure safety to patient and staff.

## 2022-10-28 NOTE — PROGRESS NOTES
[facility] - Progress Note  - Telemedicine  Today's Date and Time: 10/28/2022, 10:04 AM    Impression :   MSSA septicemia 10-11-22 x 2  Lt parotitis from dehydration and MSSA  Dehydration  Hypernatremia-resolved  Altered mental status  SHAINA- Resolved  Cirrhosis of the liver  Major depression with psychotic features  Oral candidiasis    Patient evaluated by Telemedicine. Requesting Institution: [facility]  Provider Institution: [address] [place] [address],[address] at [facility]: Ms Nicko Blancas, APRN- IM    Recommendations:     Continue cefazolin 2 gm IV q 8 hr. Stop date 11-13-22  Alternatively she could receive Ceftriaxone 2 gm IV q 24 hr until 11-13-22, if the facility where she is going can not accommodate IV infusions 3 times daily. Repeat blood cultures x 2 on 11-18-22 to document clearing of septicemia  Oral nystatin  Medical Decision Making/Summary/Discussion:10/28/2022       Infection Control Recommendations   [place] Precautions    Antimicrobial Stewardship Recommendations     Simplification of therapy  Targeted therapy    Coordination of Outpatient Care:   Estimated Length of IV antimicrobials:11-13-22  Patient will need Midline Catheter Insertion: yes  Patient will need PICC line Insertion:no  Patient will need: Home IV , [place],  SNF,  LTAC:  Yes  Patient will need outpatient wound care:No    Chief complaint/reason for consultation:   MSSA septicemia      History of Present Illness:   Florentino Huang is a 74y.o.-year-old  female who was initially admitted on 10/11/2022. Patient seen at the request of     INITIAL HISTORY:     Patient was transferred to [facility] fro [place] on 10-11-22 because of altered mental status. She was found to be severely hypernatremic with a Na of 161, dehydrated and in SHAINA. She has received hydration with improvement. Has been evaluated by Nephrology.      She has a past Hx of ETOH intake, cirrhosis of the liver with ascites, CAD, essential HTN, DM 2, GERD, bladder incontinence. ID service asked to evaluate because of S aureus septicemia on 10-13-22. CURRENT EVALUATION :10/28/2022  BP (!) 130/55   Pulse 66   Temp (!) 96.4 °F (35.8 °C) (Temporal)   Resp 18   Ht 5' 6\" (1.676 m)   Wt 144 lb 9 oz (65.6 kg)   SpO2 97%   BMI 23.33 kg/m²     Afebrile  VS stable    The patient has been minimally interactive with staff and has continued confusion. The patient has been refusing oral medications    A psych consult has been made    There is no growth on repeat blood cultures from 10/18/22  Midline placed for outpatient antibiotics  Is tolerating antibiotic treatment without any difficulty    No other acute issues noted    Labs, X rays reviewed: 10/28/2022    BUN: 4  Cr:0.47    WBC: 8.3-->7.5  Hb:12.4  Plat: 80-->97    Cultures:  Urine:    Blood:  10-11-22: MSSA x 2  10-18-22: No growth   Sputum :    Wound:      Discussed with VICK, RN. I have personally reviewed the past medical history, past surgical history, medications, social history, and family history, and I have updated the database accordingly. Past Medical History:     Past Medical History:   Diagnosis Date    Alcohol dependence in remission [facility])     Alcoholic cirrhosis of liver without ascites (HCC)     ASHD (arteriosclerotic heart disease)     Bacteremia due to Staphylococcus aureus 10/13/2022    Depression     Diabetes mellitus (HCC)     GERD (gastroesophageal reflux disease)     Hepatitis C     WITH FULL RECOVERY - INFECTIOUS DISEASE DR. Alfonso Hardy - LAST VISIT 7/2020    Hyperlipidemia     Hypertension     Irregular heartbeat     Murmur, cardiac     noted by PCP, no echo per patient    Urinary bladder incontinence     Wears eyeglasses     Well adult health check     PCP - DR. Lucille Ramirez - 3/2021    Well adult health check     INFECTIOUS DISEASE - DR. Juan Jose Roblero    Well adult health check     GI - DR. Chiqui LEONARD       Past Surgical  History:     Past Surgical History:   Procedure Laterality Date    BREAST SURGERY Right     REMOVAL BENIGN TUMOR    COLONOSCOPY      HIP SURGERY Right 1964    RECONSTRUCTION    TONSILLECTOMY      AS A CHILD    TUBAL LIGATION  1975    VITRECTOMY Left 2021    VITRECTOMY Left 2021    VITRECTOMY 25 GAUGE, MEMBRANE PEEL, ICG performed by Yolanda Andre MD at [facility] OR       Medications:      risperiDONE  0.5 mg Oral Nightly    tamsulosin  0.4 mg Oral Daily    lidocaine 1 % injection  5 mL IntraDERmal Once    sodium chloride flush  5-40 mL IntraVENous 2 times per day    docusate sodium  100 mg Oral BID    senna  1 tablet Oral Nightly    chlorhexidine  15 mL Mouth/Throat BID    nystatin  5 mL Oral 4x Daily    ceFAZolin  2,000 mg IntraVENous Q8H    insulin lispro  0-8 Units SubCUTAneous TID WC    insulin lispro  0-4 Units SubCUTAneous Nightly    aspirin  81 mg Oral Daily    benztropine  1 mg Oral BID    divalproex  125 mg Oral TID    folic acid  1 mg Oral Daily    metoprolol tartrate  50 mg Oral BID    pantoprazole  40 mg Oral QAM AC    potassium chloride  10 mEq Oral Daily    pravastatin  40 mg Oral Daily    vitamin B-1  100 mg Oral Daily    sodium chloride flush  5-40 mL IntraVENous 2 times per day       Social History:     Social History     Socioeconomic History    Marital status: Single     Spouse name: Not on file    Number of children: Not on file    Years of education: Not on file    Highest education level: Not on file   Occupational History    Not on file   Tobacco Use    Smoking status: Former     Types: Cigarettes     Quit date:      Years since quittin.8    Smokeless tobacco: Never   Vaping Use    Vaping Use: Never used   Substance and Sexual Activity    Alcohol use: Not on file    Drug use: Not on file    Sexual activity: Not on file   Other Topics Concern    Not on file   Social History Narrative    Not on file     Social Determinants of Health     Financial Resource Strain: Not on file   Food Insecurity: Not on file   Transportation Needs: Not on file   Physical Activity: Not on file   Stress: Not on file   Social Connections: Not on file   Intimate Partner Violence: Not on file   Housing Stability: Not on file       Family History:   History reviewed. No pertinent family history. Allergies:   Lisinopril     Review of Systems:     Patient unable to provide ROS. Confused and agitated. 10/28/2022        Constitutional: No fevers or chills. No systemic complaints  Head: No headaches  Eyes: No double vision or blurry vision. No conjunctival inflammation. ENT: No sore throat or runny nose. . No hearing loss, tinnitus or vertigo. Cardiovascular: No chest pain or palpitations. No shortness of breath. No TESFAYE  Lung: No shortness of breath or cough. No sputum production  Abdomen: No nausea, vomiting, diarrhea, or abdominal pain. Lynda Quiver No cramps. Genitourinary: No increased urinary frequency, or dysuria. No hematuria. No suprapubic or CVA pain  Musculoskeletal: No muscle aches or pains. No joint effusions, swelling or deformities  Hematologic: No bleeding or bruising. Neurologic: No headache, weakness, numbness, or tingling. Integument: No rash, no ulcers. Psychiatric: No depression. Endocrine: No polyuria, no polydipsia, no polyphagia. Physical Examination :   Patient Vitals for the past 8 hrs:   BP Temp Temp src Pulse Resp SpO2 Weight   10/28/22 0700 (!) 130/55 (!) 96.4 °F (35.8 °C) Temporal 66 18 97 % --   10/28/22 0320 (!) 141/56 97 °F (36.1 °C) Temporal 66 18 100 % 144 lb 9 oz (65.6 kg)     General Appearance: Awake, mentation altered and in no apparent distress  Head:  Normocephalic, no trauma  Eyes: Pupils equal, round, reactive to light and accommodation; extraocular movements intact; sclera anicteric; conjunctivae pink. No embolic phenomena. ENT: Oropharynx clear, without erythema, exudate, or thrush.  No tenderness of sinuses. Mouth/throat: mucosa pink and moist. No lesions. Lt side parotitis   Neck:Supple, without lymphadenopathy. Thyroid normal, No bruits. Pulmonary/Chest: Clear to auscultation, without wheezes, rales, or rhonchi. No dullness to percussion. Cardiovascular: Regular rate and rhythm without murmurs, rubs, or gallops. Abdomen: Soft, non tender. Bowel sounds normal. No organomegaly. Ascites. All four Extremities: No cyanosis, clubbing, edema, or effusions. Neurologic: No gross sensory or motor deficits. Confused and agitated . Has tremors  Skin: Warm and dry with good turgor. No signs of peripheral arterial or venous insufficiency. No ulcerations. No open wounds. Medical Decision Making -Laboratory:   I have independently reviewed/ordered the following labs:    CBC with Differential:   No results for input(s): WBC, HGB, HCT, PLT, SEGSPCT, BANDSPCT, LYMPHOPCT, MONOPCT, EOSPCT in the last 72 hours. BMP:   Recent Labs     10/26/22  0538      K 3.5*      CO2 26   BUN 4*   CREATININE 0.47*   MG 1.7     Hepatic Function Panel:   No results for input(s): PROT, LABALBU, BILIDIR, IBILI, BILITOT, ALKPHOS, ALT, AST in the last 72 hours. No results for input(s): RPR in the last 72 hours. No results for input(s): HIV in the last 72 hours. No results for input(s): BC in the last 72 hours. Lab Results   Component Value Date/Time    MUCUS TRACE 10/11/2022 07:00 PM    RBC 4.23 10/18/2022 06:20 AM    WBC 7.5 10/18/2022 06:20 AM    TURBIDITY Clear 10/11/2022 07:00 PM     Lab Results   Component Value Date/Time    CREATININE 0.47 10/26/2022 05:38 AM    GLUCOSE 106 10/26/2022 05:38 AM       Medical Decision Making-Imaging:     EXAMINATION:   ONE XRAY VIEW OF THE CHEST       10/11/2022 10:30 am       COMPARISON:   None. HISTORY:   ORDERING SYSTEM PROVIDED HISTORY: altered mental status   TECHNOLOGIST PROVIDED HISTORY:   altered mental status       FINDINGS:   The heart is not enlarged.   No pulmonary venous congestion or edema. No   convincing lung consolidation or infiltrate. No pleural effusion or   pneumothorax. Osseous structures grossly intact. Impression   No acute cardiopulmonary process     Medical Decision [facility]:       Medical Decision Making-Other:     Note:  Labs, medications, radiologic studies were reviewed with personal review of films  Large amounts of data were reviewed  Discussed with nursing Staff, Discharge planner  Infection Control and Prevention measures reviewed  All prior entries were reviewed  Administer medications as ordered  Prognosis: Guarded  Discharge planning reviewed  Follow up as outpatient. Thank you for allowing us to participate in the care of this patient. Please call with questions. Elías Damon, APRN - CNP    ATTESTATION:    I have discussed the case, including pertinent history and exam findings with the APRN. I have evaluated the  History, physical findings and pictures of the patient and the key elements of the encounter have been performed by me. I have reviewed the laboratory data, other diagnostic studies and discussed them with the APRN. I have updated the medical record where necessary. I agree with the assessment, plan and orders as documented by the APRN.     Carley West MD.        Pager: [phone] - Office: [phone]

## 2022-10-28 NOTE — PROGRESS NOTES
Progress Note    SUBJECTIVE:    Patient seen for f/u of Hypernatremia. She resting in bed eyes closed  easy resp. No distress. She does have some restlessness    ROS: Unable due to altered mental status     All other systems were reviewed with the patient and are negative unless otherwise stated in HPI      OBJECTIVE:      Vitals:   Vitals:    10/28/22 0700   BP: (!) 130/55   Pulse: 66   Resp: 18   Temp: (!) 96.4 °F (35.8 °C)   SpO2: 97%     Weight: 144 lb 9 oz (65.6 kg)   Height: 5' 6\" (167.6 cm)     Weight  Wt Readings from Last 3 Encounters:   10/28/22 144 lb 9 oz (65.6 kg)   04/22/21 180 lb (81.6 kg)     Body mass index is 23.33 kg/m². 24HR INTAKE/OUTPUT:      Intake/Output Summary (Last 24 hours) at 10/28/2022 0958  Last data filed at 10/28/2022 0844  Gross per 24 hour   Intake 2066 ml   Output 1625 ml   Net 441 ml     -----------------------------------------------------------------  Exam:    GEN:   eyes closed easy resp. No distress Stimulation given to awaken and encourage oral intake  EYES:   EOMI, pupils equal   NECK: Supple. No lymphadenopathy. No carotid bruit  CVS:     regular rate and rhythm, no audible murmur  PULM:  diminished but CTA, no wheezes, rales or rhonchi, no acute respiratory distress  ABD:     Bowels sounds normal.  Abdomen is soft. No distention. no tenderness to palpation. EXT:     no edema bilaterally . No calf tenderness. NEURO: Moves all extremities. Motor and sensory are grossly intact. SKIN:    No rashes.   No skin lesions    -----------------------------------------------------------------    Diagnostic Data:        Last 3 Blood Glucose:   Recent Labs     10/26/22  0538   GLUCOSE 106*        Comprehensive Metabolic Profile:   Recent Labs     10/26/22  0538      K 3.5*      CO2 26   BUN 4*   CREATININE 0.47*   GLUCOSE 106*   CALCIUM 8.4*        Urinalysis:   Lab Results   Component Value Date/Time    NITRU NEGATIVE 10/11/2022 07:00 PM    COLORU Yellow 10/11/2022 07:00 PM    PHUR 6.0 10/11/2022 07:00 PM    WBCUA 2 TO 5 10/11/2022 07:00 PM    RBCUA 2 TO 5 10/11/2022 07:00 PM    MUCUS TRACE 10/11/2022 07:00 PM    BACTERIA 1+ 10/11/2022 07:00 PM    SPECGRAV 1.020 10/11/2022 07:00 PM    LEUKOCYTESUR NEGATIVE 10/11/2022 07:00 PM    UROBILINOGEN ELEVATED 10/11/2022 07:00 PM    BILIRUBINUR NEGATIVE 10/11/2022 07:00 PM    GLUCOSEU 3+ 10/11/2022 07:00 PM    KETUA NEGATIVE 10/11/2022 07:00 PM       HgBA1c:    Lab Results   Component Value Date/Time    LABA1C 6.8 10/11/2022 09:35 AM       Radiology/Imaging:  XR CHEST (2 VW)   Final Result   No acute process. XR CHEST PORTABLE   Final Result   No acute cardiopulmonary process         CT Head W/O Contrast   Final Result   No acute intracranial abnormality. Prominent senescent changes. These appear more prominent than usually seen   in a patient of this age, but could be due to old injuries, various   medications, or history of substance abuse.                ASSESSMENT / PLAN:  Hypernatremia  Resolved  Appreciate Nephrology  IVF  Trend labs  Dehydration  IVF  Trend labs-improving  Possibly due to oversedation, decreased oral intake, psychotropic medications including other medications such as lactulose and Jardiance  Type 2 diabetes  POCT before meals and at bedtime  Insulin sliding scale  Hypoglycemia protocol  Hold Jardiance  Major depressive disorder with psychotic features  Stop Geodon   Continue Cogentin, Depakote   Stop Trintellix, - not supplied   Continue Risperdal to 0.5 mg nightly  Stopped oral Haldol due to continued sedation despite reduction of doses  Continue Ativan 0.5 mg to  nightly prn as needed  Stop Abilify  Appreciate Psych eval for medication assistance  Visual hallucinations -- seeing Tigers  Restlessness  Bacteremia  Appreciate ID for ATB for DC   Stop Levaquin  Continue  Ancef through 11/13/2022  BC - Staph Aureus   Repeat BC - resolving  Thrush  Resolved  Completed IV Diflucan  Continue Peridox  Constipation  Resolved  Continue Colace, Senokot  Witnessed ALEXX  Decreased responsiveness  Decreased doses of Haldol, Ativan and Risperodol  Essential hypertension  Continue Lopressor  Nutrition status:   at risk for malnutrition  Dietician consult initiated  Hospital Prophylaxis:   DVT: Lovenox   Stress Ulcer: H2 Blocker   High risk medications: none   Disposition:    Discharge plan is - Return to [facility] when medically cleared  Continuing to look at other options  IV ATB through 11/13 Ancef or Rocephin will be fine      ROB Pastor - CNP , ROB, NP-C  Hospitalist Medicine        10/28/2022, 9:58 AM

## 2022-10-28 NOTE — PROGRESS NOTES
Continue: ofloxacin (ofloxacin): drops: 0.3% 1 drop four times a day as directed 08- Patient no longer resisting safety measures for self. Resting in bed with eyes open to closed at times. No s/s of distress or pain noted. Call light and bedside table within reach. Will continue to monitor.

## 2022-10-28 NOTE — DISCHARGE INSTR - COC
Continuity of Care Form    Patient Name: Sanjana Ramirez   :  1955  MRN:  [id number]    Admit date:  10/11/2022  Discharge date:  10/28/2022    Code Status Order: Full Code   Advance Directives:     Admitting Physician:  Claudia Bar MD  PCP: Rodney Dudley DO    Discharging Nurse: Vel Tai RN  [address] Unit/Room#: 7690/0149-04  Discharging Unit Phone Number:[phone]      Emergency Contact:   Extended Emergency Contact Information  Primary Emergency Contact: Nasrin Ahmadi  Home Phone: [phone]  Relation: Brother/Sister  Preferred language: English   needed?  No    Past Surgical History:  Past Surgical History:   Procedure Laterality Date    BREAST SURGERY Right     REMOVAL BENIGN TUMOR    COLONOSCOPY      HIP SURGERY Right     RECONSTRUCTION    TONSILLECTOMY      AS A CHILD    TUBAL LIGATION      VITRECTOMY Left 2021    VITRECTOMY Left 2021    VITRECTOMY 25 GAUGE, MEMBRANE PEEL, ICG performed by Kirk Ye MD at [address] [place] [address] History:   Immunization History   Administered Date(s) Administered    COVID-19, MODERNA BLUE border, Primary or Immunocompromised, (age 12y+), IM, 100 mcg/0.5mL 2021, 04/10/2021       Active Problems:  Patient Active Problem List   Diagnosis Code    Macular pucker, left eye H35.372    Hypernatremia E87.0    Dehydration E86.0    Major depressive disorder with psychotic features ([address] F32.3    Type 2 diabetes mellitus without complication ([address] D38.4    Essential hypertension I10    ASHD (arteriosclerotic heart disease) I25.10    Gastroesophageal reflux disease without esophagitis K21.9    Bacteremia due to Staphylococcus aureus R78.81, B95.61    MSSA (methicillin susceptible Staphylococcus aureus) septicemia (HCC) A41.01    Parotitis, acute K11.21    SHAINA (acute kidney injury) ([address] F18.4    Alcoholic cirrhosis of liver with ascites ([address] K70.31    Major depressive disorder F32.9    ALEXX (obstructive sleep apnea)-witnessed G47.33    Hypokalemia E87.6    Mild malnutrition (HCC) E44.1       Isolation/Infection:   Isolation            No Isolation          Patient Infection Status       Infection Onset Added Last Indicated Last Indicated By Review Planned Expiration Resolved Resolved By    None active    Resolved    COVID-19 (Rule Out) 10/11/22 10/11/22 10/11/22 COVID-19, Rapid (Ordered)   10/11/22 Rule-Out Test Resulted            Nurse Assessment:  Last Vital Signs: BP (!) 130/55   Pulse 66   Temp (!) 96.4 °F (35.8 °C) (Temporal)   Resp 18   Ht 5' 6\" (1.676 m)   Wt 144 lb 9 oz (65.6 kg)   SpO2 97%   BMI 23.33 kg/m²     Last documented pain score (0-10 scale): Pain Level: 0  Last Weight:   Wt Readings from Last 1 Encounters:   10/28/22 144 lb 9 oz (65.6 kg)     Mental Status:  alert    IV Access:  - None    Nursing Mobility/ADLs:  Walking   Dependent  Transfer  Dependent  Bathing  Dependent  Dressing  Dependent  Toileting  Dependent  Feeding  Dependent  Med Admin  Dependent  Med Delivery   whole and prefers mixed with pudding     Wound Care Documentation and Therapy:  Incision 04/22/21 Eye Left (Active)   Number of days: 554        Elimination:  Continence: Bowel: No  Bladder: No  Urinary Catheter: None   Colostomy/Ileostomy/Ileal Conduit: No       Date of Last BM: 10/27/2022      Intake/Output Summary (Last 24 hours) at 10/28/2022 1507  Last data filed at 10/28/2022 1433  Gross per 24 hour   Intake 1222 ml   Output 1775 ml   Net -553 ml     I/O last 3 completed shifts: In: 2248 [P.O.:240;  I.V.:4817]  Out: 2525 [Urine:2525]    Safety Concerns:     Sundowners [place]rome, History of Falls (last 30 days), and At Risk for Falls    Impairments/Disabilities:      None    Nutrition Therapy:  Current Nutrition Therapy:   - Oral Diet:  Dysphagia 1 pureed    Routes of Feeding: Oral  Liquids: Nectar Thick Liquids  Daily Fluid Restriction: no  Last Modified Barium Swallow with Video (Video Swallowing Test): not done    Treatments at the Time of Hospital Discharge:   Respiratory Treatments: none    Oxygen Therapy:  is not on home oxygen therapy. Ventilator:    - No ventilator support    Rehab Therapies: Physical Therapy, Occupational Therapy, and Speech/Language Therapy  Weight Bearing Status/Restrictions: No weight bearing restrictions  Other Medical Equipment (for information only, NOT a DME order):  none    Other Treatments: none      Patient's personal belongings (please select all that are sent with patient):  None    RN SIGNATURE:  Electronically signed by Lydia Kong RN on 10/28/22 at 4:07 PM EDT    CASE MANAGEMENT/SOCIAL WORK SECTION    Inpatient Status Date: 10/11/22    Readmission Risk Assessment Score:  Readmission Risk              Risk of Unplanned Readmission:  25           Discharging to Facility/ Agency   Name: [address] [place] [address]:[phone]  [facility] [place]:[phone]    Dialysis Facility (if applicable)   Name:  Address:  Dialysis Schedule:  Phone:  Fax:    / signature: Electronically signed by JUSTEN Chávez on 10/28/22 at 10:55 AM EDT    PHYSICIAN SECTION    Prognosis: Fair    Condition at Discharge: Stable    Rehab Potential (if transferring to Rehab): Fair    Recommended Labs or Other Treatments After Discharge: na    Physician Certification: I certify the above information and transfer of Ale Lombardo  is necessary for the continuing treatment of the diagnosis listed and that she requires [facility] for less 30 days.      Update Admission H&P: No change in H&P    PHYSICIAN SIGNATURE:  Electronically signed by ROB Elizondo CNP on 10/28/22 at 3:10 PM EDT

## 2022-10-28 NOTE — PROGRESS NOTES
Physical Therapy  Facility/Department: [facility] MED SURG  Daily Treatment Note  NAME: Christianne Pike  : 1955  MRN: [id number]    Date of Service: 10/28/2022    Discharge Recommendations:  [address]     Patient Diagnosis(es): The primary encounter diagnosis was Hypernatremia. Diagnoses of Dehydration and Altered mental status, unspecified altered mental status type were also pertinent to this visit. Assessment   Assessment: PROM completed to B LE's to prevent blood clots and contractures and maintain ROM. Resistance noted throughout. MaxA x 2 for transfer from chair to bed, poor participation with functional mobility. Will continue to progress as tolerated. Activity Tolerance: Other (comment) (pt unresponsive through out PROM)     Plan    Physcial Therapy Plan  General Plan: 2 times a day 7 days a week (1x per day on weekends)     Restrictions  Restrictions/Precautions  Restrictions/Precautions: General Precautions, Fall Risk  Required Braces or Orthoses?: No     Subjective    Subjective  Subjective: pt in chair upon arrival, no changes to report from the staff, pt unable to appropriatly answer questions. Pain: denied  Orientation  Overall Orientation Status: Impaired     Objective   Vitals     Bed Mobility Training  Bed Mobility Training: Yes  Overall Level of Assistance: Assist X2;Maximum assistance; Total assistance  Rolling: Assist X2;Maximum assistance; Total assistance  Sit to Supine: Maximum assistance;Assist X2  Scooting: Maximum assistance;Assist X2  Transfer Training  Transfer Training: Yes (Pt with difficulty following directions. Pt went into extension at knees and back once standing, unable to participate in transfer.)  Overall Level of Assistance: Maximum assistance;Assist X2  Interventions: Verbal cues;Manual cues; Safety awareness training  Sit to Stand: Maximum assistance;Assist X2  Stand to Sit: Maximum assistance;Assist X2  Stand Pivot Transfers: Maximum assistance;Assist X2  Bed to Chair: Maximum assistance;Assist X2  Gait Training  Gait Training: No     PT Exercises  Exercise Treatment: PROM B LE's 2 x15 in all available planes of motion. Pt with frequent episodes of resistance. Safety Devices  Type of Devices: Sitter present;Nurse notified; Heels elevated for pressure relief;Patient at risk for falls; Left in chair;Call light within reach; All fall risk precautions in place (1:1 supervision, Phoenix Robles present following treatment.)  Restraints  Restraints Initially in Place: No       Goals  Short Term Goals  Time Frame for Short Term Goals: 3 DAYS  Short Term Goal 1: MOD ASSIST BED MOBILITY  Short Term Goal 2: MIN ASSIST TRANSFERS. Long Term Goals  Time Frame for Long Term Goals : 4 WEEKS  Long Term Goal 1: MIN ASSIST TRANSFERS AND MIN ASSIST BED MOBILITY. Patient Goals   Patient Goals : UNABLE TO ASCERTAIN DUE TO IMPAIRED COGNITION AND LETHARGY.     Therapy Time   Individual Concurrent Group Co-treatment   Time In 7869         Time Out [address] [address]

## 2022-10-28 NOTE — PROGRESS NOTES
[facility]    Facility/Department: [facility] MED SURG    Speech Language Pathology    Dysphagia Treatment    NAME:Bridget Robles    : 1955 (74 y.o.)    MRN: [id number]    ROOM: [id number]-    ADMISSION DATE: 10/11/2022    PATIENT DIAGNOSIS(ES): Dehydration [E86.0]  Hypernatremia [E87.0]  Altered mental status, unspecified altered mental status type [R41.82]    Chief Complaint   Patient presents with    Altered Mental Status     Na+ 161 per [facility]       Patient Active Problem List    Diagnosis Date Noted    Mild malnutrition ([address] 10/25/2022    Hypokalemia 10/19/2022    ALEXX (obstructive sleep apnea)-witnessed 10/18/2022    MSSA (methicillin susceptible Staphylococcus aureus) septicemia ([address] 10/17/2022    Parotitis, acute 10/17/2022    SHAINA (acute kidney injury) ([address]     Alcoholic cirrhosis of liver with ascites ([address] 10/17/2022    Major depressive disorder 10/17/2022    Bacteremia due to Staphylococcus aureus 10/13/2022    Hypernatremia 10/11/2022    Dehydration 10/11/2022    Major depressive disorder with psychotic features ([address] 10/11/2022    Type 2 diabetes mellitus without complication ([address]     Essential hypertension 10/11/2022    ASHD (arteriosclerotic heart disease) 10/11/2022    Gastroesophageal reflux disease without esophagitis 10/11/2022    Macular pucker, left eye 2021       Past Medical History:   Diagnosis Date    Alcohol dependence in remission ([address]     Alcoholic cirrhosis of liver without ascites ([address]     ASHD (arteriosclerotic heart disease)     Bacteremia due to Staphylococcus aureus 10/13/2022    Depression     Diabetes mellitus (HCC)     GERD (gastroesophageal reflux disease)     Hepatitis C     WITH FULL RECOVERY - INFECTIOUS DISEASE DR. Sammi Torres - LAST VISIT 2020    Hyperlipidemia     Hypertension     Irregular heartbeat     Murmur, cardiac     noted by PCP, no echo per patient    Urinary bladder incontinence     Wears eyeglasses Well adult health check     PCP - DR. Kj Guido - 3/2021    [facility] adult health check     INFECTIOUS DISEASE - DR. Megan Maldonado    [facility] adult health check     GI - DR. Eileen Denise       Past Surgical History:   Procedure Laterality Date    BREAST SURGERY Right 1975    REMOVAL BENIGN TUMOR    COLONOSCOPY      HIP SURGERY Right 1964    RECONSTRUCTION    TONSILLECTOMY      AS A CHILD    TUBAL LIGATION  1975    VITRECTOMY Left 04/22/2021    VITRECTOMY Left 4/22/2021    VITRECTOMY 25 GAUGE, MEMBRANE PEEL, ICG performed by Juni Whelan MD at [facility]   Allergen Reactions    Lisinopril Other (See Comments)     COUGH       DATE ONSET: 10/11/22    Date of Evaluation: 10/28/2022    Evaluating Therapist: JEZ Ngo    Dysphagia Diagnosis    Dysphagia Diagnosis: Suspected needs further assessment; Moderate oral stage dysphagia    Recommended Diet         Diet Solids Recommendation: Pureed    Liquid Consistency Recommendation: Mildly Thick (Nectar)    Recommended Form of Meds: Meds in puree    Compensatory Swallowing Strategies : Eat/Feed slowly;Upright as possible for all oral intake;Remain upright for 30-45 minutes after meals;Assist feed; Alternate solids and liquids;Small bites/sips; Other (comments)    Reason for Referral    Remy Leggett was referred for a bedside swallow evaluation to assess the efficiency of her swallow function, identify signs and symptoms of aspiration, identify risk factors, and make recommendations regarding safe dietary consistencies, effective compensatory strategies, and safe eating environment. Prior Dysphagia History  Prior Dysphagia History: Unknown dysphagia history. Patient is currently on a puree and mildly-thick liquids. Patient Complaint  Patient Complaint: Patient has been taking minimal PO intake due to lethargy. General    Chart Reviewed: Yes  Behavior/Cognition: Confused; Lethargic;Distractible;Requires cueing  Respiratory Status: Room air  O2 Device: None (Room air)  Communication Observation:  (Patient verbal, but verbalizations are non-sensical)  Follows Directions: Simple (Intermittently follows simple directions)  Dentition: Poor dental/oral hygeine  Patient Positioning: Upright in bed  Baseline Vocal Quality: Normal  Prior Dysphagia History: Unknown dysphagia history. Patient is currently on a puree and mildly-thick liquids. Consistencies Administered: Pureed;Minced and Moist;Thin - teaspoon; Thin - cup    Vision and Hearing    Vision  Vision: Within Functional Limits  Hearing  Hearing: Within functional limits    Current Diet level    Current Diet : Pureed    Oral Motor    Labial: Decreased rate;Decreased seal  Dentition: Full  Oral Hygiene: Dried secretions; Xerostomia  Lingual: Decreased strength; Incoordinated  Mandible: Restricted    Oral/Pharyngeal Phase    Oral Phase - Comment: Patient continues to present with mild oral phase dysphagia. Patient demonstrated improved ability to remove bolus from the spoon for puree and minced and moist textures. Patient demonstrated no mastication of minced and moist solid and appeared to swallow whole. Additionally, patient demonstrated poor labial seal with thin liquids via cup. Patient demonstrated no oral residues post-swallow. Pharyngeal Phase: Patient demonstrated delayed swallow initiation of 2-3 seconds with each trial. Patient demonstrated delayed cough x 1/7 trials of thin liquids. No couging, throat clearing, or change in vocal quality with puree or minced and moist solids. PO Trials  Vocal Quality: No Impairment  Consistency Presented: Minced & Moist;Pureed; Thin  How Presented: SLP-fed/Presented  Bolus Acceptance: Impaired  Bolus Formation/Control: Impaired  Type of Impairment: Lip closure;Spillage  Propulsion: Discoordination  Oral Residue: Less than 10% of bolus  Initiation of Swallow: Delayed (# of seconds) (2-3 seconds)  Laryngeal Elevation: Functional  Aspiration Signs/Symptoms: Delayed cough/throat clear    Dysphagia Diagnosis    Dysphagia Diagnosis: Suspected needs further assessment; Moderate oral stage dysphagia    Dysphagia Outcome Severity Scale: Level 3: Moderate dysphagia- Total assisstance, supervision or strategies. Two or more diet consistencies restricted    Recommendations    Requires SLP Intervention: Yes  Diet Solids Recommendation: Pureed  Liquid Consistency Recommendation: Mildly Thick (Nectar)  Compensatory Swallowing Strategies : Eat/Feed slowly;Upright as possible for all oral intake;Remain upright for 30-45 minutes after meals;Assist feed; Alternate solids and liquids;Small bites/sips; Other (comments)  Recommended Form of Meds: Meds in puree  Therapeutic Interventions: Diet tolerance monitoring; Bolus control exercises;Oral care; Patient/Family education  Frequency of Treatment: Daily during inpatient stay    Prognosis    Prognosis: Fair    Education    Individuals consulted  Consulted and agree with results and recommendations: Patient    Patient Education: [address] tony RN re: treatment outcomes. Treatment/Goals    Short-term Goals  Timeframe for Short-term Goals: 4 days  Goal 1: Patient will trial thin liquids without overt s/sx of aspiration/penetration in 80% of opportunities. Goal 2: Patient will trial minced and moist solids with adequate bolus prepartion/propulsion and no oral residues in 80% of opportunities. Long-term Goals  Timeframe for Long-term Goals: 7 days  Goal 1: Patient will tolerate safest, least restrictive diet without overt s/sx of aspiration/penetration in 90% of opportunities. Safety Devices    Safety Devices  Safety Devices in place: Yes  Type of devices:  All fall risk precautions in place  Restraints Initially in Place: No    Pain Assessment    Pain Assessment: Patient does not appear in pain    Pain Re-assessment    Pain Reassessment: Patient does not appear in pain    Therapy Time    SLP Individual Minutes  Time In: 9455  Time Out: 0930  Minutes: 17      Patient seen in room for dysphagia treatment. Patient initially sleeping, but easily woke to clinician's voice and demonstrated improved alertness with her eyes open. At times, patient demonstrated appropriate responses to clinician's questions, but at times patient's verbalizations were non-sensical. Patient positioned upright in bed. Patient continues to present with mild oral phase dysphagia. Patient demonstrated improved ability to remove bolus from the spoon for puree and minced and moist textures. Patient demonstrated no mastication of minced and moist solid and appeared to swallow whole. Additionally, patient demonstrated poor labial seal with thin liquids via cup. Patient demonstrated no oral residues post-swallow. Patient demonstrated delayed swallow initiation of 2-3 seconds with each trial. Patient demonstrated delayed cough x 1/7 trials of thin liquids. No couging, throat clearing, or change in vocal quality with puree or minced and moist solids. ST recommends continued diet of puree solids and nectar-thickened liquids when patient is alert. ST will continue to follow during inpatient stay to determine if patient is eligible for diet upgrade.          Electronically signed: Karen Valdes M.S. [id number] [facility]             10/28/2022
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
## 2022-10-28 NOTE — PROGRESS NOTES
Occupational Therapy  Facility/Department: Critical access hospital AT THE [facility] MED SURG  Daily Treatment Note  NAME: Yunier Martin  : 1955  MRN: [id number]    Date of Service: 10/28/2022    Discharge Recommendations:  Continue to assess pending progress, Subacute/Skilled Nursing Facility         Patient Diagnosis(es): The primary encounter diagnosis was Hypernatremia. Diagnoses of Dehydration and Altered mental status, unspecified altered mental status type were also pertinent to this visit. Assessment    Assessment: unble to assess  Activity Tolerance: Other (comment) (pt unresponsive through out PROM)  Discharge Recommendations: Continue to assess pending progress;Subacute/Skilled [address] [place] [address] N  Times Per Week: 7x/week  Times Per Day: Once a day  Current Treatment Recommendations: Strengthening; Endurance training;Balance training;Patient/Caregiver education & training; Safety education & training;Self-Care / ADL     Restrictions   none    Subjective   Subjective  Subjective: Pt up in chair with limited cognition/arousal. Demo'd Min resistance with PROM to BUE x all joinst x 2-5. Occ pt would mumble or giggle. Per Nursing pt walked to chair with A and conversed with family the night previously. Pain: unable to assess           Objective    Vitals              OT Exercises  PROM Exercises: PROM completed by clinician to decrease likelihood of contractures. BUE x 3-5 x all joints shoulder, elbow and wrist and all digits and thumb. No indication of pain noted, occ resistance demo'd. No contractures or abnormalties noted     Safety Devices  Type of Devices: All fall risk precautions in place; Left in chair;Call light within reach; Telesitter in use; Chair alarm in place (1:1 with KEVIN Gr)  Restraints  Restraints Initially in Place: No     Patient Education  Education Given To: Patient  Education Provided: Role of Therapy;Plan of Care  Education Provided Comments: PROM  Education Method: Verbal  Barriers to Learning: Cognition  Education Outcome: Unable to verbalize; Unable to demonstrate understanding    Goals  Short Term Goals  Time Frame for Short Term Goals: 10 visits  Short Term Goal 1: Pt will complete AROM/AAROM to BUE as tolerated to maintain joint range of motion for improved participation in ADL and functional transfers. Short Term Goal 2: Pt will complete functional transfers during ADL tasks modA to improve safety and participation in ADL tasks. Short Term Goal 3: Pt will participate in bathing tasks while seated in chair or upright in bed with moderate verbal prompting and modA.        Therapy Time   Individual Concurrent Group Co-treatment   Time In 1049         Time Out 1101         Minutes 12                 IRASEMA Gomez

## 2022-10-28 NOTE — PROGRESS NOTES
Waiting to hear from [facility] with insurance approval.    JUSTEN Barba
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
## 2022-10-28 NOTE — PROGRESS NOTES
Pt. Resting in bed at this time. Pt. Vitals are assessed, vitals ae WDL. Pt. Has call light in reach. Pt. Brief is checked an dry at this time. Pt. Has call light in reach. Sitter at bedside.

## 2022-11-10 PROBLEM — E86.0 DEHYDRATION: Status: RESOLVED | Noted: 2022-10-11 | Resolved: 2022-11-10

## 2023-06-07 NOTE — PROGRESS NOTES
Ale Bird is a 61 y.o. female who was seen by synchronous (real-time) audio-video technology on 6/7/2023. Consent: Ale Bird, who was seen by synchronous (real-time) audio-video technology, and/or her healthcare decision maker, is aware that this patient-initiated, Telehealth encounter on 6/7/2023 is a billable service, with coverage as determined by her insurance carrier. She is aware that she may receive a bill and has provided verbal consent to proceed: Yes. Ale Bird, was evaluated through a synchronous (real-time) audio-video encounter. The patient (or guardian if applicable) is aware that this is a billable service, which includes applicable co-pays. This Virtual Visit was conducted with patient's (and/or legal guardian's) consent. Patient identification was verified, and a caregiver was present when appropriate. The patient was located at Home: [address] [place] [id number]  Provider was located at [facility] ([address] [place] [address] Dept): [address] [place] [address]  [address] [place] [address]         --Mary Kate Kathleen MD on 6/7/2023 at 3:53 PM    An electronic signature was used to authenticate this note. 712  Subjective:   Ale Bird is a 61 y.o. female who was seen for No chief complaint on file. Fever x 1 day    Developed productive cough, sinus pressure, post nasal drip 2 days ago then woke up today with fever temp 100.1. she has known asthma/allergies, using her maintenance medications. But has skipped nasal steroid spray. No known sick contact. Has not needed to use albuterol, no wheezing. Difficulty sleeping due to cough,requesting cough syrup    Prior to Admission medications    Medication Sig Start Date End Date Taking?  Authorizing Provider   azithromycin (ZITHROMAX) 250 MG tablet Take 1 tablet by mouth See Admin Instructions for 5 days 500mg on day 1 followed by 250mg on days 2 - 5 6/7/23 6/12/23 Yes Mary Kate Kathleen MD   HYDROcodone-chlorpheniramine ([address] Occupational Therapy  Facility/Department: [facility] MED SURG  Daily Treatment Note  NAME: Sanjana Ramirez  : 1955  MRN: [id number]    Date of Service: 10/17/2022    Discharge Recommendations:  Continue to assess pending progress, Subacute/Skilled Nursing Facility         Patient Diagnosis(es): The primary encounter diagnosis was Hypernatremia. Diagnoses of Dehydration and Altered mental status, unspecified altered mental status type were also pertinent to this visit. Assessment    Assessment: Pt was mildy lethargic at beginning of treatment session and became increasingly more tired by end of therapy session. Activity Tolerance: Patient limited by fatigue;Treatment limited secondary to decreased cognition  Discharge Recommendations: Continue to assess pending progress;Subacute/Skilled Nursing Facility      Plan   Occupational Therapy Plan  Times Per Week: 7x/week  Times Per Day: Once a day  Current Treatment Recommendations: Strengthening; Endurance training;Balance training;Patient/Caregiver education & training; Safety education & training;Self-Care / ADL     Restrictions  Restrictions/Precautions  Restrictions/Precautions: General Precautions; Fall Risk    Subjective   Subjective  Subjective: Pt lying awake in bed with STNA present. Pt agreed to participate in therapy session. Pain: Pt had no reports of pain. Orientation  Overall Orientation Status: Impaired  Pain: denied           Objective    Vitals     Bed Mobility Training  Bed Mobility Training: Yes  Overall Level of Assistance: Minimum assistance;Assist X2  Interventions: Manual cues;Demonstration;Visual cues; Tactile cues  Supine to Sit: Minimum assistance;Assist X2  Sit to Supine: Minimum assistance;Assist X2  Scooting: Maximum assistance;Assist X2  Balance  Sitting: Impaired (Pt required CGA up to Max A for support while seated EOB. Pt frequent cueing for safety while seated EOB with 1-2 UE support.)  Sitting - Static: Poor (constant support)  Sitting - Dynamic: Poor (constant support)  Standing: Impaired (HHA x 2 with fair- to poor tolerance)  Standing - Static:  (Range from fair- to poor with 2 person HHA.)  Transfer Training  Transfer Training: Yes  Overall Level of Assistance: Minimum assistance;Assist X2;Contact-guard assistance  Interventions: Demonstration;Verbal cues; Tactile cues; Visual cues  Sit to Stand: Contact-guard assistance;Minimum assistance;Assist X2  Stand to Sit: Minimum assistance;Assist X2;Contact-guard assistance              Safety Devices  Type of Devices: All fall risk precautions in place;Sitter present;Nurse notified;Call light within reach; Left in bed;Patient at risk for falls     Patient Education  Education Given To: Patient  Education Method: Verbal  Barriers to Learning: Cognition  Education Outcome: Continued education needed    Goals  Short Term Goals  Time Frame for Short Term Goals: 10 visits  Short Term Goal 1: Pt will complete AROM/AAROM to BUE as tolerated to maintain joint range of motion for improved participation in ADL and functional transfers. Short Term Goal 2: Pt will complete functional transfers during ADL tasks modA to improve safety and participation in ADL tasks. Short Term Goal 3: Pt will participate in bathing tasks while seated in chair or upright in bed with moderate verbal prompting and modA.        Therapy Time   Individual Concurrent Group Co-treatment   Time In 1107         Time Out 1124         Minutes 17                 TAVO De Santiago/SWETHA

## 2024-01-01 NOTE — PROGRESS NOTES
Writer at bedside at this time to perform initial shift assessment. Patient is lying in bed at this time, patient is restless and trying to swing legs out of bed. Vital signs taken and assessment completed at this time. Patient is alert to only self at this time and does not appear to be in any pain. WINDY Virgen, sitting at  bedside at this time for safety. Call light within reach, will continue to monitor. Jesusita TBD

## 2024-02-15 NOTE — PROGRESS NOTES
Patient now awake and is becoming anxious and very restless, is swinging her legs over the side rails and intermittently pulling at her meeks. Writer was able to get patient to eat, patient ate most of her applesauce, several bites of ice cream and several bites of mashed potatoes and then refused to eat anymore although writer continued to offer. Reassessment and vitals also obtained as charted. Vitals WNL. Patient remains disoriented x4, assessment otherwise as charted. Writer remains at bedside as a sitter, will continue to monitor. Attending Only

## 2024-03-01 NOTE — PROGRESS NOTES
Entered patient's room for morning vital signs and head to toe assessment. Patient resting bed at this time. A&O x1 (oriented to self, disoriented to time, place, and situation), anxious, and restless/tense. Patient given PRN ativan at this time to help with restlessness and anxiety, see MAR. Patient able to tell writer her birthday and was able to follow some commands but not all. Patient's speech can be hard to understand at times and patient does not make sense (rambling). Tremors present in the hands, arms, and legs. The patient has a swollen elías on the left side of the face directly in front of the ear. Lung sounds are clear in the upper lobes bilaterally but hard to hear in the lower lobes due to the patient unable to follow the command to take a deep breath. Heart murmur auscultated. Arnold patent and draining phylicia colored urine at this time. Patient repositioned in bed at this time. Call light within reach. Bed alarm on. Bed wheels locked. Bed in lowest position. Side rails up x3. Will continue to monitor patient. done

## 2025-04-07 NOTE — PROGRESS NOTES
Writer at bedside to complete evening assessment. Upon entry to room, pt asleep and in bed, respirations normal and unlabored while on room air. Vitals obtained and assessment completed, see flow sheet for details. Pt denies needs from writer at this time. Call light in reach. Will continue to monitor. initiated HEP (4/1/25)  2.  Pt will have <4/10 back pain at the worst for more ease with sitting/standing  Status at Evaluation: pain rating: current 6, worst 10    Current: not met - 10/10 pain at worst still (4/1/25)     Long Term Goals: To be accomplished in 8 weeks  Pt will increase B LE strength by + ½  MMT grade I n all directions for more ease with stair navigation.?    Status at Evaluation: Strength:  Manual Muscle Testing: Right Left   Hip Flexion 3+/5 3+/5   Knee Extension 3+/5 3+/5   Knee Flexion 3+/5 3+/5   Hip Abduction 3+/5 3+/5   Hip Adduction 4-/5 4-/5   Hip Extension 3+/5 3+/5     2. Patient will report decreased sleep disturbance less than  2 times per week due to pain and positioning   Status at Evaluation: reports 2-3 times per night   Current: Progressing - pt reports reduced sleep disturbances of 1-2x/night and improved sleep quality with additional pillow placements (04/07/25)     3. Patient will improve standing/ walking tolerance to at least 45 minutes to optimize community ambulation for grocery trips, doctors visits stores, performing household chores,  and completing work tasks with no more than 4/10 pain.   Status at evaluation : can only tolerated 20minute walking/ standing with pain increasing to 8-10/10   Current: Not Met: limited tolerance, 10 minute max  (4/3/2025)     4. Patient will squat and lift at least  15 lbs for functional carryover to manage ADLs including carrying groceries and laundry  Status at Evaluation: not able to perform good functional squat      5. Patient will improve Oswestry score to 40 %   for indications of improved self perception of condition and to indicate clinically significant change in symptoms.   Status at Evaluation: Oswestry  score: 52 %    Next PN/ RC due 04/25/25  Auth due (visit number/ date) pending    PLAN  - Continue Plan of Care  - Upgrade activities as tolerated    Ivis Solares, PT    4/7/2025    2:48 PM  If an interpreting

## (undated) DEVICE — RETINA PK

## (undated) DEVICE — 25GA EZPASS SOFT TIP CANNULA BOX OF 5: Brand: VORTEX SURGICAL INC

## (undated) DEVICE — 1 EACH 40411 STERILE DISPOSABLE SUPER VIEW® LENS SET & 1 EACH 40100 STERILE MICROSCOPE DRAPE: Brand: SUPER VIEW® PACK

## (undated) DEVICE — OCCUCOAT SYRINGE 1ML 6PK: Brand: OCCUCOAT

## (undated) DEVICE — GLOVE ORANGE PI 7   MSG9070

## (undated) DEVICE — OCCLUDER EYE POLYETH HYPOALRG ADH TAPE W/O PINHOLE

## (undated) DEVICE — CYCLOGEL 1% GTTS

## (undated) DEVICE — SURGICAL PROCEDURE PACK 25 GA VITRECTOMY

## (undated) DEVICE — SOLUTION IRRIG 1000ML PENTALYTE PLAS POUR BTL TIS U SOL

## (undated) DEVICE — REVOLUTION DSP 25G ILM FORCEPS: Brand: ALCON GRIESHABER REVOLUTION

## (undated) DEVICE — SYRINGE MED 50ML LUERLOCK TIP

## (undated) DEVICE — STANDARD HYPODERMIC NEEDLE,ALUMINUM HUB: Brand: MONOJECT